# Patient Record
Sex: FEMALE | Race: WHITE | Employment: OTHER | URBAN - METROPOLITAN AREA
[De-identification: names, ages, dates, MRNs, and addresses within clinical notes are randomized per-mention and may not be internally consistent; named-entity substitution may affect disease eponyms.]

---

## 2019-10-04 ENCOUNTER — APPOINTMENT (OUTPATIENT)
Dept: GENERAL RADIOLOGY | Age: 84
DRG: 682 | End: 2019-10-04
Attending: EMERGENCY MEDICINE
Payer: MEDICARE

## 2019-10-04 ENCOUNTER — HOSPITAL ENCOUNTER (INPATIENT)
Age: 84
LOS: 8 days | Discharge: HOSPICE | DRG: 682 | End: 2019-10-12
Attending: EMERGENCY MEDICINE | Admitting: HOSPITALIST
Payer: MEDICARE

## 2019-10-04 ENCOUNTER — APPOINTMENT (OUTPATIENT)
Dept: CT IMAGING | Age: 84
DRG: 682 | End: 2019-10-04
Attending: EMERGENCY MEDICINE
Payer: MEDICARE

## 2019-10-04 DIAGNOSIS — G93.41 ACUTE METABOLIC ENCEPHALOPATHY: ICD-10-CM

## 2019-10-04 DIAGNOSIS — N17.9 ACUTE KIDNEY INJURY (HCC): ICD-10-CM

## 2019-10-04 DIAGNOSIS — R41.82 ALTERED MENTAL STATUS, UNSPECIFIED ALTERED MENTAL STATUS TYPE: Primary | ICD-10-CM

## 2019-10-04 DIAGNOSIS — E87.0 HYPERNATREMIA: ICD-10-CM

## 2019-10-04 LAB
ALBUMIN SERPL-MCNC: 3.4 G/DL (ref 3.2–4.6)
ALBUMIN/GLOB SERPL: 0.9 {RATIO} (ref 1.2–3.5)
ALP SERPL-CCNC: 60 U/L (ref 50–136)
ALT SERPL-CCNC: 12 U/L (ref 12–65)
ANION GAP SERPL CALC-SCNC: 7 MMOL/L (ref 7–16)
APPEARANCE UR: CLEAR
AST SERPL-CCNC: 16 U/L (ref 15–37)
BACTERIA URNS QL MICRO: 0 /HPF
BASOPHILS # BLD: 0.1 K/UL (ref 0–0.2)
BASOPHILS NFR BLD: 0 % (ref 0–2)
BILIRUB SERPL-MCNC: 0.4 MG/DL (ref 0.2–1.1)
BILIRUB UR QL: NEGATIVE
BUN SERPL-MCNC: 133 MG/DL (ref 8–23)
CALCIUM SERPL-MCNC: 9.4 MG/DL (ref 8.3–10.4)
CASTS URNS QL MICRO: ABNORMAL /LPF
CHLORIDE SERPL-SCNC: 126 MMOL/L (ref 98–107)
CO2 SERPL-SCNC: 25 MMOL/L (ref 21–32)
COLOR UR: YELLOW
CREAT SERPL-MCNC: 4.13 MG/DL (ref 0.6–1)
DIFFERENTIAL METHOD BLD: ABNORMAL
EOSINOPHIL # BLD: 0 K/UL (ref 0–0.8)
EOSINOPHIL NFR BLD: 0 % (ref 0.5–7.8)
EPI CELLS #/AREA URNS HPF: ABNORMAL /HPF
ERYTHROCYTE [DISTWIDTH] IN BLOOD BY AUTOMATED COUNT: 14.8 % (ref 11.9–14.6)
GLOBULIN SER CALC-MCNC: 4 G/DL (ref 2.3–3.5)
GLUCOSE BLD STRIP.AUTO-MCNC: 127 MG/DL (ref 65–100)
GLUCOSE SERPL-MCNC: 137 MG/DL (ref 65–100)
GLUCOSE UR STRIP.AUTO-MCNC: NEGATIVE MG/DL
HCT VFR BLD AUTO: 35.4 % (ref 35.8–46.3)
HGB BLD-MCNC: 10.6 G/DL (ref 11.7–15.4)
HGB UR QL STRIP: NEGATIVE
IMM GRANULOCYTES # BLD AUTO: 0.1 K/UL (ref 0–0.5)
IMM GRANULOCYTES NFR BLD AUTO: 1 % (ref 0–5)
KETONES UR QL STRIP.AUTO: NEGATIVE MG/DL
LACTATE BLD-SCNC: 1.07 MMOL/L (ref 0.5–1.9)
LEUKOCYTE ESTERASE UR QL STRIP.AUTO: NEGATIVE
LYMPHOCYTES # BLD: 2 K/UL (ref 0.5–4.6)
LYMPHOCYTES NFR BLD: 14 % (ref 13–44)
MCH RBC QN AUTO: 31.8 PG (ref 26.1–32.9)
MCHC RBC AUTO-ENTMCNC: 29.9 G/DL (ref 31.4–35)
MCV RBC AUTO: 106.3 FL (ref 79.6–97.8)
MONOCYTES # BLD: 1.1 K/UL (ref 0.1–1.3)
MONOCYTES NFR BLD: 8 % (ref 4–12)
NEUTS SEG # BLD: 10.9 K/UL (ref 1.7–8.2)
NEUTS SEG NFR BLD: 77 % (ref 43–78)
NITRITE UR QL STRIP.AUTO: NEGATIVE
NRBC # BLD: 0 K/UL (ref 0–0.2)
PH UR STRIP: 5 [PH] (ref 5–9)
PLATELET # BLD AUTO: 181 K/UL (ref 150–450)
PMV BLD AUTO: 13.5 FL (ref 9.4–12.3)
POTASSIUM SERPL-SCNC: 5 MMOL/L (ref 3.5–5.1)
PROCALCITONIN SERPL-MCNC: 0.2 NG/ML
PROT SERPL-MCNC: 7.4 G/DL (ref 6.3–8.2)
PROT UR STRIP-MCNC: ABNORMAL MG/DL
RBC # BLD AUTO: 3.33 M/UL (ref 4.05–5.2)
RBC #/AREA URNS HPF: ABNORMAL /HPF
SODIUM SERPL-SCNC: 158 MMOL/L (ref 136–145)
SP GR UR REFRACTOMETRY: 1.01 (ref 1–1.02)
UROBILINOGEN UR QL STRIP.AUTO: 0.2 EU/DL (ref 0.2–1)
WBC # BLD AUTO: 14.1 K/UL (ref 4.3–11.1)
WBC URNS QL MICRO: ABNORMAL /HPF

## 2019-10-04 PROCEDURE — 82962 GLUCOSE BLOOD TEST: CPT

## 2019-10-04 PROCEDURE — 65660000000 HC RM CCU STEPDOWN

## 2019-10-04 PROCEDURE — 81003 URINALYSIS AUTO W/O SCOPE: CPT

## 2019-10-04 PROCEDURE — 87040 BLOOD CULTURE FOR BACTERIA: CPT

## 2019-10-04 PROCEDURE — 77030011943: Performed by: EMERGENCY MEDICINE

## 2019-10-04 PROCEDURE — 85025 COMPLETE CBC W/AUTO DIFF WBC: CPT

## 2019-10-04 PROCEDURE — 80053 COMPREHEN METABOLIC PANEL: CPT

## 2019-10-04 PROCEDURE — 93005 ELECTROCARDIOGRAM TRACING: CPT | Performed by: EMERGENCY MEDICINE

## 2019-10-04 PROCEDURE — 71045 X-RAY EXAM CHEST 1 VIEW: CPT

## 2019-10-04 PROCEDURE — 83605 ASSAY OF LACTIC ACID: CPT

## 2019-10-04 PROCEDURE — 84145 PROCALCITONIN (PCT): CPT

## 2019-10-04 PROCEDURE — 70450 CT HEAD/BRAIN W/O DYE: CPT

## 2019-10-04 PROCEDURE — 51701 INSERT BLADDER CATHETER: CPT | Performed by: EMERGENCY MEDICINE

## 2019-10-04 PROCEDURE — 99285 EMERGENCY DEPT VISIT HI MDM: CPT | Performed by: EMERGENCY MEDICINE

## 2019-10-05 ENCOUNTER — APPOINTMENT (OUTPATIENT)
Dept: ULTRASOUND IMAGING | Age: 84
DRG: 682 | End: 2019-10-05
Attending: INTERNAL MEDICINE
Payer: MEDICARE

## 2019-10-05 LAB
ANION GAP SERPL CALC-SCNC: 11 MMOL/L (ref 7–16)
ANION GAP SERPL CALC-SCNC: 6 MMOL/L (ref 7–16)
ANION GAP SERPL CALC-SCNC: 8 MMOL/L (ref 7–16)
ANION GAP SERPL CALC-SCNC: 9 MMOL/L (ref 7–16)
ATRIAL RATE: 85 BPM
BASOPHILS # BLD: 0.1 K/UL (ref 0–0.2)
BASOPHILS NFR BLD: 0 % (ref 0–2)
BUN SERPL-MCNC: 104 MG/DL (ref 8–23)
BUN SERPL-MCNC: 106 MG/DL (ref 8–23)
BUN SERPL-MCNC: 114 MG/DL (ref 8–23)
BUN SERPL-MCNC: 87 MG/DL (ref 8–23)
CALCIUM SERPL-MCNC: 8.2 MG/DL (ref 8.3–10.4)
CALCIUM SERPL-MCNC: 8.3 MG/DL (ref 8.3–10.4)
CALCIUM SERPL-MCNC: 8.5 MG/DL (ref 8.3–10.4)
CALCIUM SERPL-MCNC: 8.7 MG/DL (ref 8.3–10.4)
CALCULATED P AXIS, ECG09: 73 DEGREES
CALCULATED R AXIS, ECG10: 58 DEGREES
CALCULATED T AXIS, ECG11: 71 DEGREES
CHLORIDE SERPL-SCNC: 121 MMOL/L (ref 98–107)
CHLORIDE SERPL-SCNC: 125 MMOL/L (ref 98–107)
CHLORIDE SERPL-SCNC: 128 MMOL/L (ref 98–107)
CHLORIDE SERPL-SCNC: 130 MMOL/L (ref 98–107)
CO2 SERPL-SCNC: 20 MMOL/L (ref 21–32)
CO2 SERPL-SCNC: 21 MMOL/L (ref 21–32)
CO2 SERPL-SCNC: 22 MMOL/L (ref 21–32)
CO2 SERPL-SCNC: 25 MMOL/L (ref 21–32)
CREAT SERPL-MCNC: 2.8 MG/DL (ref 0.6–1)
CREAT SERPL-MCNC: 2.99 MG/DL (ref 0.6–1)
CREAT SERPL-MCNC: 3.38 MG/DL (ref 0.6–1)
CREAT SERPL-MCNC: 3.69 MG/DL (ref 0.6–1)
CREAT UR-MCNC: 43.3 MG/DL
DIAGNOSIS, 93000: NORMAL
DIFFERENTIAL METHOD BLD: ABNORMAL
EOSINOPHIL # BLD: 0 K/UL (ref 0–0.8)
EOSINOPHIL NFR BLD: 0 % (ref 0.5–7.8)
ERYTHROCYTE [DISTWIDTH] IN BLOOD BY AUTOMATED COUNT: 14.6 % (ref 11.9–14.6)
GLUCOSE SERPL-MCNC: 125 MG/DL (ref 65–100)
GLUCOSE SERPL-MCNC: 138 MG/DL (ref 65–100)
GLUCOSE SERPL-MCNC: 166 MG/DL (ref 65–100)
GLUCOSE SERPL-MCNC: 179 MG/DL (ref 65–100)
HCT VFR BLD AUTO: 30.9 % (ref 35.8–46.3)
HGB BLD-MCNC: 9.3 G/DL (ref 11.7–15.4)
IMM GRANULOCYTES # BLD AUTO: 0.1 K/UL (ref 0–0.5)
IMM GRANULOCYTES NFR BLD AUTO: 1 % (ref 0–5)
LYMPHOCYTES # BLD: 1.8 K/UL (ref 0.5–4.6)
LYMPHOCYTES NFR BLD: 15 % (ref 13–44)
MCH RBC QN AUTO: 32 PG (ref 26.1–32.9)
MCHC RBC AUTO-ENTMCNC: 30.1 G/DL (ref 31.4–35)
MCV RBC AUTO: 106.2 FL (ref 79.6–97.8)
MONOCYTES # BLD: 0.9 K/UL (ref 0.1–1.3)
MONOCYTES NFR BLD: 7 % (ref 4–12)
NEUTS SEG # BLD: 9.5 K/UL (ref 1.7–8.2)
NEUTS SEG NFR BLD: 77 % (ref 43–78)
NRBC # BLD: 0 K/UL (ref 0–0.2)
OSMOLALITY UR: 374 MOSM/KG H2O (ref 50–1400)
P-R INTERVAL, ECG05: 164 MS
PLATELET # BLD AUTO: 150 K/UL (ref 150–450)
PMV BLD AUTO: 13.2 FL (ref 9.4–12.3)
POTASSIUM SERPL-SCNC: 4.1 MMOL/L (ref 3.5–5.1)
POTASSIUM SERPL-SCNC: 4.1 MMOL/L (ref 3.5–5.1)
POTASSIUM SERPL-SCNC: 4.5 MMOL/L (ref 3.5–5.1)
POTASSIUM SERPL-SCNC: 4.7 MMOL/L (ref 3.5–5.1)
PROT UR-MCNC: 31 MG/DL
PROT/CREAT UR-RTO: 0.7
Q-T INTERVAL, ECG07: 340 MS
QRS DURATION, ECG06: 68 MS
QTC CALCULATION (BEZET), ECG08: 404 MS
RBC # BLD AUTO: 2.91 M/UL (ref 4.05–5.2)
SODIUM SERPL-SCNC: 152 MMOL/L (ref 136–145)
SODIUM SERPL-SCNC: 154 MMOL/L (ref 136–145)
SODIUM SERPL-SCNC: 159 MMOL/L (ref 136–145)
SODIUM SERPL-SCNC: 161 MMOL/L (ref 136–145)
SODIUM UR-SCNC: 59 MMOL/L
VENTRICULAR RATE, ECG03: 85 BPM
WBC # BLD AUTO: 12.4 K/UL (ref 4.3–11.1)

## 2019-10-05 PROCEDURE — 74011000302 HC RX REV CODE- 302: Performed by: HOSPITALIST

## 2019-10-05 PROCEDURE — 74011250636 HC RX REV CODE- 250/636: Performed by: INTERNAL MEDICINE

## 2019-10-05 PROCEDURE — 77030020263 HC SOL INJ SOD CL0.9% LFCR 1000ML

## 2019-10-05 PROCEDURE — 80048 BASIC METABOLIC PNL TOTAL CA: CPT

## 2019-10-05 PROCEDURE — 77030020250 HC SOL INJ D 5% LFCR 1000ML BG LF

## 2019-10-05 PROCEDURE — 76775 US EXAM ABDO BACK WALL LIM: CPT

## 2019-10-05 PROCEDURE — 83935 ASSAY OF URINE OSMOLALITY: CPT

## 2019-10-05 PROCEDURE — 65660000000 HC RM CCU STEPDOWN

## 2019-10-05 PROCEDURE — 74011250636 HC RX REV CODE- 250/636: Performed by: HOSPITALIST

## 2019-10-05 PROCEDURE — 77030040361 HC SLV COMPR DVT MDII -B

## 2019-10-05 PROCEDURE — 84300 ASSAY OF URINE SODIUM: CPT

## 2019-10-05 PROCEDURE — 74011250636 HC RX REV CODE- 250/636: Performed by: EMERGENCY MEDICINE

## 2019-10-05 PROCEDURE — 84156 ASSAY OF PROTEIN URINE: CPT

## 2019-10-05 PROCEDURE — 77030019605

## 2019-10-05 PROCEDURE — 74011000258 HC RX REV CODE- 258: Performed by: HOSPITALIST

## 2019-10-05 PROCEDURE — 85025 COMPLETE CBC W/AUTO DIFF WBC: CPT

## 2019-10-05 PROCEDURE — 77030040830 HC CATH URETH FOL MDII -A

## 2019-10-05 PROCEDURE — 36415 COLL VENOUS BLD VENIPUNCTURE: CPT

## 2019-10-05 PROCEDURE — 86580 TB INTRADERMAL TEST: CPT | Performed by: HOSPITALIST

## 2019-10-05 PROCEDURE — 65270000029 HC RM PRIVATE

## 2019-10-05 RX ORDER — ACETAMINOPHEN 325 MG/1
650 TABLET ORAL
Status: DISCONTINUED | OUTPATIENT
Start: 2019-10-05 | End: 2019-10-12 | Stop reason: HOSPADM

## 2019-10-05 RX ORDER — SODIUM CHLORIDE 0.9 % (FLUSH) 0.9 %
5-40 SYRINGE (ML) INJECTION EVERY 8 HOURS
Status: DISCONTINUED | OUTPATIENT
Start: 2019-10-05 | End: 2019-10-12 | Stop reason: HOSPADM

## 2019-10-05 RX ORDER — HYDRALAZINE HYDROCHLORIDE 20 MG/ML
10 INJECTION INTRAMUSCULAR; INTRAVENOUS
Status: DISCONTINUED | OUTPATIENT
Start: 2019-10-05 | End: 2019-10-12 | Stop reason: HOSPADM

## 2019-10-05 RX ORDER — BISACODYL 5 MG
5 TABLET, DELAYED RELEASE (ENTERIC COATED) ORAL DAILY PRN
Status: DISCONTINUED | OUTPATIENT
Start: 2019-10-05 | End: 2019-10-12 | Stop reason: HOSPADM

## 2019-10-05 RX ORDER — ONDANSETRON 2 MG/ML
4 INJECTION INTRAMUSCULAR; INTRAVENOUS
Status: DISCONTINUED | OUTPATIENT
Start: 2019-10-05 | End: 2019-10-12 | Stop reason: HOSPADM

## 2019-10-05 RX ORDER — DEXTROSE MONOHYDRATE 50 MG/ML
125 INJECTION, SOLUTION INTRAVENOUS CONTINUOUS
Status: DISCONTINUED | OUTPATIENT
Start: 2019-10-05 | End: 2019-10-06

## 2019-10-05 RX ORDER — SODIUM CHLORIDE 0.9 % (FLUSH) 0.9 %
5-40 SYRINGE (ML) INJECTION AS NEEDED
Status: DISCONTINUED | OUTPATIENT
Start: 2019-10-05 | End: 2019-10-12 | Stop reason: HOSPADM

## 2019-10-05 RX ORDER — HEPARIN SODIUM 5000 [USP'U]/ML
5000 INJECTION, SOLUTION INTRAVENOUS; SUBCUTANEOUS EVERY 8 HOURS
Status: DISCONTINUED | OUTPATIENT
Start: 2019-10-05 | End: 2019-10-12 | Stop reason: HOSPADM

## 2019-10-05 RX ORDER — DEXTROSE MONOHYDRATE AND SODIUM CHLORIDE 5; .45 G/100ML; G/100ML
100 INJECTION, SOLUTION INTRAVENOUS CONTINUOUS
Status: DISCONTINUED | OUTPATIENT
Start: 2019-10-05 | End: 2019-10-05

## 2019-10-05 RX ADMIN — HEPARIN SODIUM 5000 UNITS: 5000 INJECTION INTRAVENOUS; SUBCUTANEOUS at 20:58

## 2019-10-05 RX ADMIN — Medication 10 ML: at 21:01

## 2019-10-05 RX ADMIN — HEPARIN SODIUM 5000 UNITS: 5000 INJECTION INTRAVENOUS; SUBCUTANEOUS at 12:22

## 2019-10-05 RX ADMIN — Medication 10 ML: at 03:53

## 2019-10-05 RX ADMIN — Medication 10 ML: at 14:39

## 2019-10-05 RX ADMIN — DEXTROSE MONOHYDRATE 125 ML/HR: 5 INJECTION, SOLUTION INTRAVENOUS at 23:43

## 2019-10-05 RX ADMIN — DEXTROSE MONOHYDRATE 125 ML/HR: 5 INJECTION, SOLUTION INTRAVENOUS at 15:33

## 2019-10-05 RX ADMIN — DEXTROSE MONOHYDRATE 125 ML/HR: 5 INJECTION, SOLUTION INTRAVENOUS at 07:43

## 2019-10-05 RX ADMIN — CEFTRIAXONE 1 G: 1 INJECTION, POWDER, FOR SOLUTION INTRAMUSCULAR; INTRAVENOUS at 01:54

## 2019-10-05 RX ADMIN — SODIUM CHLORIDE 1000 ML: 900 INJECTION, SOLUTION INTRAVENOUS at 00:02

## 2019-10-05 RX ADMIN — DEXTROSE MONOHYDRATE AND SODIUM CHLORIDE 100 ML/HR: 5; .45 INJECTION, SOLUTION INTRAVENOUS at 01:56

## 2019-10-05 RX ADMIN — Medication 10 ML: at 05:14

## 2019-10-05 RX ADMIN — TUBERCULIN PURIFIED PROTEIN DERIVATIVE 5 UNITS: 5 INJECTION, SOLUTION INTRADERMAL at 02:00

## 2019-10-05 NOTE — CONSULTS
[address] [place] [address]    Name:  Jaleesa Teixeira  MR#:  [id number]  :  1928  ACCOUNT #:  [de-identified]  DATE OF SERVICE:  10/05/2019    REASON FOR CONSULTATION:  We are seeing the patient at the request of Dr. Chanel Mejia with regards to acute-on-chronic kidney disease. HISTORY OF PRESENT ILLNESS:  The patient is a 80-year-old  female who was brought to the emergency room at [address] on 10/04/2019 by her son. Apparently, she had become more unresponsive over the past several days and had no oral intake during this period of time. The patient has chronic Alzheimer's disease and has been non-communicative for at least the past 4 years. Her condition has been slowly deteriorating and seems to have gotten more acutely worse this week. The patient apparently also has a history of chronic kidney disease. She has been seen by a physician at [facility]. Those records were not available for review unfortunately. Her son says that about a year ago her kidney function had dropped off from about 80% down to 50% of normal and since then has been progressively getting worse. He really did not know any other details about her medical condition. Review of lab work available here shows that on admission yesterday she had a BUN of 133, creatinine 4.13. By this morning after about 8 hours of IV fluid, her sodium had gone up from 158 up to 161. Her BUN has dropped from 133 down to 114. Her creatinine has gone from 4.13 down to 3.69. Urinalysis from yesterday here showed urine specific gravity of 1.015 with only a trace of protein present and 3-5 hyaline casts per high-powered field. PAST MEDICAL HISTORY:  Significant for stage III chronic kidney disease as stated above, Alzheimer's dementia, chronic hypertension, hyperlipidemia, breast cancer status post left mastectomy.     MEDICATIONS PRIOR TO ADMISSION:  Her medications prior to admission had included lisinopril. Currently, she is on Rocephin 1 g IV q.24 hours, heparin 5000 units subcu q.8 hours, and D5W at 125 mL/hour. ALLERGIES:  SHE HAS NO KNOWN DRUG ALLERGIES. SOCIAL HISTORY:  She lives at home with her  and a daughter who looks after both of her parents. She does not smoke, does not drink any alcohol. FAMILY HISTORY:  Negative for any kidney disease. REVIEW OF SYSTEMS:  Cannot be obtained. PHYSICAL EXAMINATION:  GENERAL:  Reveals an elderly white female who is unresponsive. VITAL SIGNS:  She is afebrile. Blood pressure is 134/72; pulse is 83; respirations are 18, they are not labored. HEENT:  Her head is normocephalic. Eyes:  Pupils are equal and reactive to light and accommodation. Extraocular muscles are intact. Fundi were not visualized. LUNGS:  Clear. No rales or wheezes heard. Breath sounds equal bilaterally. HEART:  Regular rate and rhythm. ABDOMEN:  Soft. Bowel sounds are present. There is no hepatosplenomegaly. GENITAL/RECTAL:  Deferred. EXTREMITIES:  She has no peripheral edema. IMPRESSION:  1. Acute-on-chronic kidney disease, at least part of this may be due to volume depletion and due to poor p.o. intake. 2.  Hypernatremia. Again, I feel that this is due to total free water deficit from poor oral intake. 3.  End-stage Alzheimer's disease. 4.  Remote history of breast cancer. 5.  History of hypertension. PLANS AND RECOMMENDATIONS:  I agree with IV hydration and as here she is currently receiving with D5W to help with both her volume depletion and hypernatremia, we will have a Riddle catheter placed. We will check urine electrolytes. We will also get a renal ultrasound to get an idea what her baseline renal function is.   I did have a discussion with her son about her overall medical condition and we both agreed that if her renal function did not improve that we would not proceed down the road of providing any dialytic support, but would only provide her with comfort care only. Thank you very much for allowing us to see her and helping in her care. We will follow with you.       Steve Rodriguez MD      [id number]  D:  10/05/2019 9:16  T:  10/05/2019 12:29  JOB #:  [id number]  CC:  [facility]       MD Lubna Hays MD

## 2019-10-05 NOTE — PROGRESS NOTES
Patient condition unchanged  She occasionally opens eyes but does not follow  Continue iv fluid  Site without redness or edema  Riddle patent with yellow urine  Daughter at bedside

## 2019-10-05 NOTE — ED PROVIDER NOTES
[id number] Ivory is a 80 y.o. female seen on 10/4/2019 at 11:21 PM in the [facility] EMERGENCY DEPT in room ER04/04. Chief Complaint   Patient presents with    Altered mental status     HPI: 51-year-old female brought into the emergency department by family for altered mental status and decreased p.o. intake. They note that she has significant dementia at baseline and is unable to hold conversations but she will make audible noises in response to physical stimuli. However over the last 2 days she has had no oral intake of either water or solid food. Her son states that he has been attempting to pipette Ensure via straw into her mouth but she is been drooling it out of the corner of her mouth. They noticed significant change from her baseline mental status. She does live at home with her  who they report he is unable to come to the ER tonight because he has a fever and is not feeling well. He does state that she would not want to be intubated or have CPR. Historian: Patient    REVIEW OF SYSTEMS     Review of Systems   Constitutional: Positive for activity change. Negative for fever. HENT: Negative. Eyes: Negative. Respiratory: Negative for cough, chest tightness, shortness of breath and wheezing. Cardiovascular: Negative for chest pain. Gastrointestinal: Negative for abdominal distention, abdominal pain, constipation, diarrhea and vomiting. Endocrine: Negative. Genitourinary: Negative for dysuria, flank pain, frequency and urgency. Neurological: Negative for dizziness, syncope and headaches. Psychiatric/Behavioral: Negative. All other systems reviewed and are negative. PAST MEDICAL HISTORY     No past medical history on file. No past surgical history on file.   Social History     Socioeconomic History    Marital status:      Spouse name: Not on file    Number of children: Not on file    Years of education: Not on file    Highest education level: Not on file     None     No Known Allergies     PHYSICAL EXAM       Vitals:    10/04/19 2141 10/04/19 2247   BP: 117/62    Pulse: 86    Resp: 28    Temp: 99.6 °F (37.6 °C) 98.8 °F (37.1 °C)   SpO2: 96%     Vital signs were reviewed. Physical Exam   Constitutional: She appears well-developed. She appears lethargic. No distress. Very thin   HENT:   Head: Normocephalic and atraumatic. Eyes: Pupils are equal, round, and reactive to light. EOM are normal.   Neck: Normal range of motion. Neck supple. Cardiovascular: Normal rate, regular rhythm, normal heart sounds and intact distal pulses. Exam reveals no gallop and no friction rub. No murmur heard. Pulmonary/Chest: Effort normal and breath sounds normal. No stridor. No respiratory distress. She has no wheezes. Abdominal: Soft. Bowel sounds are normal. She exhibits no distension and no mass. There is no tenderness. There is no rebound and no guarding. Musculoskeletal: Normal range of motion. She exhibits no edema, tenderness or deformity. Neurological: She appears lethargic. No sensory deficit. GCS eye subscore is 4. GCS verbal subscore is 1. GCS motor subscore is 5.   responds to noxious stimuli in all 4 extremities, no verbal response, spontaneous eye movements   Skin: Skin is warm and dry. No rash noted. She is not diaphoretic. No erythema. Psychiatric: She has a normal mood and affect. Her behavior is normal.   Vitals reviewed.        MEDICAL DECISION MAKING     MDM  Number of Diagnoses or Management Options  Acute kidney injury ([facility] [address]): new, needed workup  Acute metabolic encephalopathy: new, needed workup  Altered mental status, unspecified altered mental status type: new, needed workup  Hypernatremia: new, needed workup     Amount and/or Complexity of Data Reviewed  Clinical lab tests: ordered and reviewed  Tests in the radiology section of CPT®: ordered and reviewed  Obtain history from someone other than the patient: yes    Risk of Complications, Morbidity, and/or Mortality  Presenting problems: high  Diagnostic procedures: high  Management options: high    Patient Progress  Patient progress: (Remains critical  )    Procedures    ED Course: Initial presentation and concern for possible sepsis, dehydration, intracranial lesion. At this time, CT is pending as is urinalysis. She will need to be admitted to the hospital, but will be signed out to Dr. Shalonda Serna on to follow-up on the results of these tests and then discussed the case with the hospitalist for admission. Disposition: Transition of care to Dr. Shalonda Serna  Diagnosis:  Altered mental status  ____________________________________________________________________  A portion of this note was generated using voice recognition dictation software. While the note has been reviewed for accuracy, please note certain words and phrases may not be transcribed as intended and some grammatical and/or typographical errors may be present. Results Reviewed:      Recent Results (from the past 24 hour(s))   EKG, 12 LEAD, INITIAL    Collection Time: 10/04/19  9:49 PM   Result Value Ref Range    Ventricular Rate 85 BPM    Atrial Rate 85 BPM    P-R Interval 164 ms    QRS Duration 68 ms    Q-T Interval 340 ms    QTC Calculation (Bezet) 404 ms    Calculated P Axis 73 degrees    Calculated R Axis 58 degrees    Calculated T Axis 71 degrees    Diagnosis       !! AGE AND GENDER SPECIFIC ECG ANALYSIS !!   Normal sinus rhythm  Septal infarct , age undetermined  Abnormal ECG  No previous ECGs available     CBC WITH AUTOMATED DIFF    Collection Time: 10/04/19  9:51 PM   Result Value Ref Range    WBC 14.1 (H) 4.3 - 11.1 K/uL    RBC 3.33 (L) 4.05 - 5.2 M/uL    HGB 10.6 (L) 11.7 - 15.4 g/dL    HCT 35.4 (L) 35.8 - 46.3 %    .3 (H) 79.6 - 97.8 FL    MCH 31.8 26.1 - 32.9 PG    MCHC 29.9 (L) 31.4 - 35.0 g/dL    RDW 14.8 (H) 11.9 - 14.6 %    PLATELET 866 333 - 834 K/uL    MPV 13.5 (H) 9.4 - 12.3 FL    ABSOLUTE NRBC 0.00 0.0 - 0.2 K/uL    DF AUTOMATED      NEUTROPHILS 77 43 - 78 %    LYMPHOCYTES 14 13 - 44 %    MONOCYTES 8 4.0 - 12.0 %    EOSINOPHILS 0 (L) 0.5 - 7.8 %    BASOPHILS 0 0.0 - 2.0 %    IMMATURE GRANULOCYTES 1 0.0 - 5.0 %    ABS. NEUTROPHILS 10.9 (H) 1.7 - 8.2 K/UL    ABS. LYMPHOCYTES 2.0 0.5 - 4.6 K/UL    ABS. MONOCYTES 1.1 0.1 - 1.3 K/UL    ABS. EOSINOPHILS 0.0 0.0 - 0.8 K/UL    ABS. BASOPHILS 0.1 0.0 - 0.2 K/UL    ABS. IMM.  GRANS. 0.1 0.0 - 0.5 K/UL   PROCALCITONIN    Collection Time: 10/04/19  9:51 PM   Result Value Ref Range    Procalcitonin 0.2 ng/mL   POC LACTIC ACID    Collection Time: 10/04/19  9:51 PM   Result Value Ref Range    Lactic Acid (POC) 1.07 0.5 - 1.9 mmol/L   GLUCOSE, POC    Collection Time: 10/04/19  9:56 PM   Result Value Ref Range    Glucose (POC) 127 (H) 65 - 100 mg/dL   URINALYSIS W/ RFLX MICROSCOPIC    Collection Time: 10/04/19 10:40 PM   Result Value Ref Range    Color YELLOW      Appearance CLEAR      Specific gravity 1.015 1.001 - 1.023      pH (UA) 5.0 5.0 - 9.0      Protein TRACE (A) NEG mg/dL    Glucose NEGATIVE  mg/dL    Ketone NEGATIVE  NEG mg/dL    Bilirubin NEGATIVE  NEG      Blood NEGATIVE  NEG      Urobilinogen 0.2 0.2 - 1.0 EU/dL    Nitrites NEGATIVE  NEG      Leukocyte Esterase NEGATIVE  NEG      WBC 0-3 0 /hpf    RBC 3-5 0 /hpf    Epithelial cells 0-3 0 /hpf    Bacteria 0 0 /hpf    Casts 3-5 0 /lpf   METABOLIC PANEL, COMPREHENSIVE    Collection Time: 10/04/19 10:41 PM   Result Value Ref Range    Sodium 158 (H) 136 - 145 mmol/L    Potassium 5.0 3.5 - 5.1 mmol/L    Chloride 126 (H) 98 - 107 mmol/L    CO2 25 21 - 32 mmol/L    Anion gap 7 7 - 16 mmol/L    Glucose 137 (H) 65 - 100 mg/dL     (H) 8 - 23 MG/DL    Creatinine 4.13 (H) 0.6 - 1.0 MG/DL    GFR est AA 13 (L) >60 ml/min/1.73m2    GFR est non-AA 11 (L) >60 ml/min/1.73m2    Calcium 9.4 8.3 - 10.4 MG/DL    Bilirubin, total 0.4 0.2 - 1.1 MG/DL    ALT (SGPT) 12 12 - 65 U/L    AST (SGOT) 16 15 - 37 U/L    Alk. phosphatase 60 50 - 136 U/L    Protein, total 7.4 6.3 - 8.2 g/dL    Albumin 3.4 3.2 - 4.6 g/dL    Globulin 4.0 (H) 2.3 - 3.5 g/dL    A-G Ratio 0.9 (L) 1.2 - 3.5         CT HEAD WO CONT   Final Result   IMPRESSION:      No acute intracranial abnormalities. Date of Dictation: 10/4/2019 11:34 PM         XR CHEST PORT   Final Result   IMPRESSION: Thoracic aortic aneurysm versus ectasia. There are no prior studies. Discussed with hospitalist who will evaluate in the emergency department.

## 2019-10-05 NOTE — PROGRESS NOTES
Patient arrives room 832 via stretcher accompanied by son. Patient not verbal . Patient can't follow commands. Respirations are even and unlabored. Dual skin assessment completed with Davis Pax. Redness noted on sacrum and bilateral heels. No skin break down noted. Bed is low and locked.

## 2019-10-05 NOTE — PROGRESS NOTES
Patient does not open eyes  Moves arms in response to tactile stimuli  Respirations are unlabored  Breath sounds clear.   Son staying at bedside

## 2019-10-05 NOTE — PROGRESS NOTES
#16 reyes inserted per order  Assisted by [facility] RN  Sterile technique  Immediate return of clear urine   Patient tolerated well

## 2019-10-05 NOTE — ED TRIAGE NOTES
Pt arrives with EMS M3, patient came from home, family called 46 for decreased alertness and reports patient has not eaten or drunken anything in 2 days. Per EMS, family reports baseline mentation is get up from chair and walk with assistance and is able to eat. In past 36 hours, patient has not been verbal, but will respond to painful stimuli and still moves extremities. EMS bgl 140, /80, HR 86. 18g IV present in left forearm. Per family, a DNR has been discussed but not completed.

## 2019-10-05 NOTE — ED NOTES
TRANSFER - OUT REPORT:    Verbal report given to [facility] on [employer]  being transferred to [id number] for routine progression of care       Report consisted of patients Situation, Background, Assessment and   Recommendations(SBAR). Information from the following report(s) SBAR, ED Summary and MAR was reviewed with the receiving nurse. Lines:   Peripheral IV 10/04/19 Left; Outer Forearm (Active)   Site Assessment Clean, dry, & intact 10/4/2019 10:18 PM   Phlebitis Assessment 0 10/4/2019 10:18 PM   Infiltration Assessment 0 10/4/2019 10:18 PM   Dressing Status Clean, dry, & intact 10/4/2019 10:18 PM   Action Taken Blood drawn 10/4/2019 10:18 PM       Peripheral IV 10/04/19 Left Antecubital (Active)   Site Assessment Clean, dry, & intact 10/4/2019 10:18 PM   Phlebitis Assessment 0 10/4/2019 10:18 PM   Infiltration Assessment 0 10/4/2019 10:18 PM   Dressing Status Clean, dry, & intact 10/4/2019 10:18 PM   Action Taken Blood drawn 10/4/2019 10:18 PM       Peripheral IV 10/04/19 Right Antecubital (Active)   Site Assessment Clean, dry, & intact 10/4/2019 10:20 PM   Phlebitis Assessment 0 10/4/2019 10:20 PM   Infiltration Assessment 0 10/4/2019 10:20 PM   Dressing Status Clean, dry, & intact 10/4/2019 10:20 PM   Action Taken Blood drawn 10/4/2019 10:20 PM        Opportunity for questions and clarification was provided.       Patient transported with:   [employer]

## 2019-10-05 NOTE — H&P
Hospitalist Note     Admit Date:  10/4/2019  9:38 PM   Name:  Doris Hawk   Age:  80 y.o.  :  1928   MRN:  [id number]   PCP:  Pattie Correia MD  Treatment Team: Attending Provider: Citlalli Chavira MD; Primary Nurse: Silverio Bolanos RN    HPI/Subjective:   Chief complaint altered mental status poorly responsive    History of present illness and review of systems could not be obtained from the patient secondary to her altered mental status. Most of the information is obtained from patient's son Eligio Kaiser at bedside as well as ER physician. This is a 80-year-old female with past medical history significant for Alzheimer's dementia, hypertension, dyslipidemia, breast cancer status post left mastectomy, CKD unknown stage presented to the ER because of altered mental status. Patient's son states that she has been doing fairly well until 2 to 3 days ago when she stopped eating or drinking much. She was also very weak and unable to ambulate. At baseline patient is able to ambulate and will respond with audible noises. She lives at home with her  who is sick with fever. Per the son, the patient is not having any nausea or vomiting or diarrhea. No cough or shortness of breath. At the time of my evaluation patient is awake with eyes open but not responding to verbal stimuli. She is able to withdraw from pain. ER course  Patient is afebrile and hemodynamically stable. CBC with white cell count of 14, hemoglobin of 10.6 platelet count of 221    CMP with sodium of 158 potassium 5 chloride 126 bicarbonate of 25 BUN of 133 creatinine of 4.13 glucose of 137. Total bilirubin of 0.4 ALT 12 AST 16 alkaline phosphatase 16 procalcitonin of 0.2   urine analysis, with no signs of UTI. CT scan of the head without contrast negative for acute intracranial abnormality. Chest x-ray was personally reviewed by me showed no acute cardiopulmonary abnormality.   There is thoracic aortic aneurysm versus ectasia no prior studies for comparison. EKG showed normal sinus rhythm with no ST-T wave changes suggestive of ischemia. IV Fluids NS bolus x1,     Patient admitted for further evaluation management of her acute metabolic encephalopathy acute kidney injury on chronic kidney disease and severe hypernatremia. PMHx:  HTN  Dyslipidemia  Ultram as dementia  Breast cancer status post mastectomy  CKD unknown stage    Past surgical history  Left mastectomy  Appendectomy    Family history was reviewed with the patient's son and not contributory. Social history:  The patient is not a former or current smoker no alcohol use or usage of illicit drugs per the son at bedside    No Known Allergies       PTA Medications:  Lisinopril hydrochlorothiazide 20/12.5 mg       Objective:     Patient Vitals for the past 24 hrs:   Temp Pulse Resp BP SpO2   10/04/19 2247 98.8 °F (37.1 °C)       10/04/19 2141 99.6 °F (37.6 °C) 86 28 117/62 96 %     Oxygen Therapy  O2 Sat (%): 96 % (10/04/19 2141)  O2 Device: Room air (10/04/19 2141)    No intake or output data in the 24 hours ending 10/05/19 0037    *Note that automatically entered I/Os may not be accurate; dependent on patient compliance with collection and accurate  by assistants. Physical Exam:  General:    Elderly female minimally responsive, awake with eyes open, withdraws to pain, ill-appearing,   Eyes:   Normal sclerae. Pupils equal round reacting to light, extraocular movements intact. HENT:  Normocephalic, atraumatic. Very dry mucous membranes  CV:   RRR. No m/r/g. Lungs:  CTAB. No wheezing, rhonchi, or rales. Abdomen: Soft, nontender, nondistended. No organomegaly, bowel sounds active,   Extremities: Warm and dry. No cyanosis or edema.   Neurologic: Limited neurological exam as patient is minimally responsive, no focal neurological deficits on exam, patient has intermittent shaking movements in the right upper extremity which are self resolved and this is chronic for years, per patient's son at bedside  Skin:     No rashes or jaundice. Normal coloration  Psych:  Unable to assess    I reviewed the labs, imaging, EKGs, telemetry, and other studies done this admission. Data Review:   Recent Results (from the past 24 hour(s))   EKG, 12 LEAD, INITIAL    Collection Time: 10/04/19  9:49 PM   Result Value Ref Range    Ventricular Rate 85 BPM    Atrial Rate 85 BPM    P-R Interval 164 ms    QRS Duration 68 ms    Q-T Interval 340 ms    QTC Calculation (Bezet) 404 ms    Calculated P Axis 73 degrees    Calculated R Axis 58 degrees    Calculated T Axis 71 degrees    Diagnosis       !! AGE AND GENDER SPECIFIC ECG ANALYSIS !! Normal sinus rhythm  Septal infarct , age undetermined  Abnormal ECG  No previous ECGs available     CBC WITH AUTOMATED DIFF    Collection Time: 10/04/19  9:51 PM   Result Value Ref Range    WBC 14.1 (H) 4.3 - 11.1 K/uL    RBC 3.33 (L) 4.05 - 5.2 M/uL    HGB 10.6 (L) 11.7 - 15.4 g/dL    HCT 35.4 (L) 35.8 - 46.3 %    .3 (H) 79.6 - 97.8 FL    MCH 31.8 26.1 - 32.9 PG    MCHC 29.9 (L) 31.4 - 35.0 g/dL    RDW 14.8 (H) 11.9 - 14.6 %    PLATELET 451 625 - 762 K/uL    MPV 13.5 (H) 9.4 - 12.3 FL    ABSOLUTE NRBC 0.00 0.0 - 0.2 K/uL    DF AUTOMATED      NEUTROPHILS 77 43 - 78 %    LYMPHOCYTES 14 13 - 44 %    MONOCYTES 8 4.0 - 12.0 %    EOSINOPHILS 0 (L) 0.5 - 7.8 %    BASOPHILS 0 0.0 - 2.0 %    IMMATURE GRANULOCYTES 1 0.0 - 5.0 %    ABS. NEUTROPHILS 10.9 (H) 1.7 - 8.2 K/UL    ABS. LYMPHOCYTES 2.0 0.5 - 4.6 K/UL    ABS. MONOCYTES 1.1 0.1 - 1.3 K/UL    ABS. EOSINOPHILS 0.0 0.0 - 0.8 K/UL    ABS. BASOPHILS 0.1 0.0 - 0.2 K/UL    ABS. IMM.  GRANS. 0.1 0.0 - 0.5 K/UL   PROCALCITONIN    Collection Time: 10/04/19  9:51 PM   Result Value Ref Range    Procalcitonin 0.2 ng/mL   POC LACTIC ACID    Collection Time: 10/04/19  9:51 PM   Result Value Ref Range    Lactic Acid (POC) 1.07 0.5 - 1.9 mmol/L   GLUCOSE, POC    Collection Time: 10/04/19  9:56 PM Result Value Ref Range    Glucose (POC) 127 (H) 65 - 100 mg/dL   URINALYSIS W/ RFLX MICROSCOPIC    Collection Time: 10/04/19 10:40 PM   Result Value Ref Range    Color YELLOW      Appearance CLEAR      Specific gravity 1.015 1.001 - 1.023      pH (UA) 5.0 5.0 - 9.0      Protein TRACE (A) NEG mg/dL    Glucose NEGATIVE  mg/dL    Ketone NEGATIVE  NEG mg/dL    Bilirubin NEGATIVE  NEG      Blood NEGATIVE  NEG      Urobilinogen 0.2 0.2 - 1.0 EU/dL    Nitrites NEGATIVE  NEG      Leukocyte Esterase NEGATIVE  NEG      WBC 0-3 0 /hpf    RBC 3-5 0 /hpf    Epithelial cells 0-3 0 /hpf    Bacteria 0 0 /hpf    Casts 3-5 0 /lpf   METABOLIC PANEL, COMPREHENSIVE    Collection Time: 10/04/19 10:41 PM   Result Value Ref Range    Sodium 158 (H) 136 - 145 mmol/L    Potassium 5.0 3.5 - 5.1 mmol/L    Chloride 126 (H) 98 - 107 mmol/L    CO2 25 21 - 32 mmol/L    Anion gap 7 7 - 16 mmol/L    Glucose 137 (H) 65 - 100 mg/dL     (H) 8 - 23 MG/DL    Creatinine 4.13 (H) 0.6 - 1.0 MG/DL    GFR est AA 13 (L) >60 ml/min/1.73m2    GFR est non-AA 11 (L) >60 ml/min/1.73m2    Calcium 9.4 8.3 - 10.4 MG/DL    Bilirubin, total 0.4 0.2 - 1.1 MG/DL    ALT (SGPT) 12 12 - 65 U/L    AST (SGOT) 16 15 - 37 U/L    Alk.  phosphatase 60 50 - 136 U/L    Protein, total 7.4 6.3 - 8.2 g/dL    Albumin 3.4 3.2 - 4.6 g/dL    Globulin 4.0 (H) 2.3 - 3.5 g/dL    A-G Ratio 0.9 (L) 1.2 - 3.5         All Micro Results     Procedure Component Value Units Date/Time    CULTURE, BLOOD [[id number]] Collected:  10/04/19 2205    Order Status:  Completed Specimen:  Blood Updated:  10/04/19 2240    CULTURE, BLOOD [[id number]] Collected:  10/04/19 2213    Order Status:  Completed Specimen:  Blood Updated:  10/04/19 8264          Current Facility-Administered Medications   Medication Dose Route Frequency    sodium chloride (NS) flush 5-40 mL  5-40 mL IntraVENous Q8H    sodium chloride (NS) flush 5-40 mL  5-40 mL IntraVENous PRN    tuberculin injection 5 Units  5 Units IntraDERMal ONCE    dextrose 5 % - 0.45% NaCl infusion  100 mL/hr IntraVENous CONTINUOUS    acetaminophen (TYLENOL) tablet 650 mg  650 mg Oral Q4H PRN    ondansetron (ZOFRAN) injection 4 mg  4 mg IntraVENous Q4H PRN    bisacodyl (DULCOLAX) tablet 5 mg  5 mg Oral DAILY PRN    heparin (porcine) injection 5,000 Units  5,000 Units SubCUTAneous Q8H    sodium chloride 0.9 % bolus infusion 1,000 mL  1,000 mL IntraVENous ONCE     No current outpatient medications on file. Other Studies:  Ct Head Wo Cont    Result Date: 10/4/2019  CT HEAD WITHOUT CONTRAST HISTORY:  Transient alteration of awareness. . COMPARISON: None. TECHNIQUE: Axial imaging was performed without intravenous contrast utilizing 5mm slice thickness. Sagittal and coronal reformats were performed. Radiation dose reduction techniques were used for this study. Our CT scanner uses one or all of the following: Automated exposure control, adjustment of the MAS or KUB according to patient's size and iterative reconstruction. FINDINGS:    *BRAIN:    -  There are no early signs of territorial or lacunar infarction by CT.    -  Symmetric supratentorial atrophy. -  For patient's age, the scattered areas of white matter hypodensities may represent a chronic small vessel white matter ischemia. However this is nonspecific. *VISUALIZED PARANASAL SINUSES: Well aerated. *MASTOIDS:  Clear. *CALVARIUM AND SCALP: Unremarkable. IMPRESSION: No acute intracranial abnormalities. Date of Dictation: 10/4/2019 11:34 PM     Xr Chest Port    Result Date: 10/4/2019  EXAM: XR CHEST PORT INDICATION: AMS COMPARISON: None. FINDINGS: A portable AP radiograph of the chest was obtained at 2216 hours. The patient is on a cardiac monitor. The lungs are clear. Thoracic aortic aneurysm versus ectasia. .  The bones and soft tissues are grossly within normal limits. IMPRESSION: Thoracic aortic aneurysm versus ectasia. There are no prior studies.       Assessment and Plan:     [facility] Problems as of 10/5/2019 Never Reviewed          Codes Class Noted - Resolved POA    Hypernatremia ICD-10-CM: E87.0  ICD-9-CM: 276.0  10/4/2019 - Present Unknown        MATTHEW (acute kidney injury) ([facility] [address] ICD-10-CM: N17.9  ICD-9-CM: 584.9  10/4/2019 - Present Unknown        Acute metabolic encephalopathy UCQ-36-IX: G93.41  ICD-9-CM: 348.31  10/4/2019 - Present Unknown              Plan: This is a 80-year-old female with    Acute metabolic encephalopathy multifactorial in the setting of baseline Alzheimer's dementia   Could be secondary to uremia, severe dehydration with worsening renal function, hypernatremia  CT head negative. Underlying infection cannot be completely ruled out   Procalcitonin 0.2, blood cultures x2 done on admission  Will start the patient empirically on ceftriaxone   Will hydrate the patient with IV fluids,  Neurochecks with vital signs  Will check MRI of the brain. Acute kidney injury on chronic kidney disease unknown stage  Creatinine of 4.3 with BUN of 133  Patient received normal saline bolus 1 L in the ER  We will do D5 half-normal saline given severe hypernatremia. Repeat BMP  every 6 hours. Will consult nephrology. Appreciate recommendations  Per patient's son, no dialysis but will discuss with the patient's . Severe hypernatremia  Free water deficit of 1794 mL. Hydrate with D5 half-normal saline at 100 mL/h  BMP every 6 hours  Goal correction not more than 6 to 8 mEq in 24 hours    Alzheimer's dementia    Hypertension  Lisinopril and hydrochlorothiazide on hold due to acute kidney injury  Hydralazine as needed    Dyslipidemia:   Not on medications    Diet NPO given pt's altered mental status and poor responsiveness. Discussed with the patient's son about the critical condition of the patient. Agreeable to hydration and current management plan.   But if the patient's condition does not improve, then will readdress the goals of care    Discharge planning: Patient son states that family prefers patient be discharged home.     DVT ppx: Heparin subcu  Code status:  DNR  DPOA patient's son Bogdan Ramey phone number [phone]  Estimated LOS:  Greater than 2 midnights  Risk:  high    Signed:  Yoli Cam MD

## 2019-10-05 NOTE — PROGRESS NOTES
Speech therapy note  Attempted to see pt this am, however, pt unable to awake.      Deangelo Connell MA/VALENCIA/SLP

## 2019-10-05 NOTE — CONSULTS
[facility]        Subjective: A on CKD  Renal consult dictated # [id number]    Review of Systems -   Can not be obtained due to pt's condition. Objective:    Vitals:    10/05/19 0224 10/05/19 0254 10/05/19 0354 10/05/19 0810   BP:  142/69 138/77 134/72   Pulse:  90 86 83   Resp:  24 18 18   Temp:   98.2 °F (36.8 °C) 97.7 °F (36.5 °C)   SpO2: 100% 100% 96% 94%   Weight:       Height:           PE  Gen: unresponsiive  CV:reg rate  Chest:clear  Abd: soft  Ext/Access: no edema       . LAB  Recent Labs     10/04/19  2151   WBC 14.1*   HGB 10.6*   HCT 35.4*        Recent Labs     10/05/19  0558 10/04/19  2241   * 158*   K 4.7 5.0   * 126*   CO2 20* 25   * 137*   * 133*   CREA 3.69* 4.13*   CA 8.5 9.4   ALB  --  3.4   TBILI  --  0.4   ALT  --  12   SGOT  --  16           Radiology    A/P:   Patient Active Problem List   Diagnosis Code    Hypernatremia E87.0    MATTHEW (acute kidney injury) ([facility] [address] N17.9    Acute metabolic encephalopathy U60.11     A on CKD3 - baseline renal function not available. At least part of this is due to volume depletion due to poor PO intake from her Alzheimer's. Will check urine lytes. , renal ultrasound. Riddle cath. Hypernatremia. - suspect this is due to pure water deficit from poor PO intake, not DI    HTN    Dementia.          Hector Benson MD

## 2019-10-05 NOTE — PROGRESS NOTES
Returned to room from ultrasound  Patient opens eyes and mumbles on transfer from stretcher to bed  Son remains at bedside

## 2019-10-05 NOTE — PROGRESS NOTES
Visit by spiritual volunteer.     Iram Bhatti, staff Kendrick mcg[address]is 80, [address] [facility]  /   [email or web address]

## 2019-10-06 ENCOUNTER — APPOINTMENT (OUTPATIENT)
Dept: MRI IMAGING | Age: 84
DRG: 682 | End: 2019-10-06
Attending: HOSPITALIST
Payer: MEDICARE

## 2019-10-06 ENCOUNTER — APPOINTMENT (OUTPATIENT)
Dept: GENERAL RADIOLOGY | Age: 84
DRG: 682 | End: 2019-10-06
Attending: HOSPITALIST
Payer: MEDICARE

## 2019-10-06 PROBLEM — R62.7 ADULT FAILURE TO THRIVE: Status: ACTIVE | Noted: 2019-10-06

## 2019-10-06 LAB
ANION GAP SERPL CALC-SCNC: 5 MMOL/L (ref 7–16)
ANION GAP SERPL CALC-SCNC: 8 MMOL/L (ref 7–16)
ANION GAP SERPL CALC-SCNC: 9 MMOL/L (ref 7–16)
BUN SERPL-MCNC: 66 MG/DL (ref 8–23)
BUN SERPL-MCNC: 71 MG/DL (ref 8–23)
BUN SERPL-MCNC: 78 MG/DL (ref 8–23)
CALCIUM SERPL-MCNC: 7.8 MG/DL (ref 8.3–10.4)
CALCIUM SERPL-MCNC: 7.9 MG/DL (ref 8.3–10.4)
CALCIUM SERPL-MCNC: 8.1 MG/DL (ref 8.3–10.4)
CHLORIDE SERPL-SCNC: 116 MMOL/L (ref 98–107)
CHLORIDE SERPL-SCNC: 117 MMOL/L (ref 98–107)
CHLORIDE SERPL-SCNC: 118 MMOL/L (ref 98–107)
CO2 SERPL-SCNC: 20 MMOL/L (ref 21–32)
CO2 SERPL-SCNC: 21 MMOL/L (ref 21–32)
CO2 SERPL-SCNC: 22 MMOL/L (ref 21–32)
CREAT SERPL-MCNC: 2.2 MG/DL (ref 0.6–1)
CREAT SERPL-MCNC: 2.36 MG/DL (ref 0.6–1)
CREAT SERPL-MCNC: 2.49 MG/DL (ref 0.6–1)
GLUCOSE SERPL-MCNC: 116 MG/DL (ref 65–100)
GLUCOSE SERPL-MCNC: 129 MG/DL (ref 65–100)
GLUCOSE SERPL-MCNC: 99 MG/DL (ref 65–100)
MM INDURATION POC: 0 MM (ref 0–5)
POTASSIUM SERPL-SCNC: 3.8 MMOL/L (ref 3.5–5.1)
POTASSIUM SERPL-SCNC: 4.3 MMOL/L (ref 3.5–5.1)
POTASSIUM SERPL-SCNC: 5.1 MMOL/L (ref 3.5–5.1)
PPD POC: NEGATIVE NEGATIVE
SODIUM SERPL-SCNC: 145 MMOL/L (ref 136–145)
SODIUM SERPL-SCNC: 145 MMOL/L (ref 136–145)
SODIUM SERPL-SCNC: 146 MMOL/L (ref 136–145)

## 2019-10-06 PROCEDURE — 80048 BASIC METABOLIC PNL TOTAL CA: CPT

## 2019-10-06 PROCEDURE — 65270000029 HC RM PRIVATE

## 2019-10-06 PROCEDURE — 74011000258 HC RX REV CODE- 258: Performed by: HOSPITALIST

## 2019-10-06 PROCEDURE — 77030020250 HC SOL INJ D 5% LFCR 1000ML BG LF

## 2019-10-06 PROCEDURE — 71045 X-RAY EXAM CHEST 1 VIEW: CPT

## 2019-10-06 PROCEDURE — 65660000000 HC RM CCU STEPDOWN

## 2019-10-06 PROCEDURE — 74011250636 HC RX REV CODE- 250/636: Performed by: INTERNAL MEDICINE

## 2019-10-06 PROCEDURE — 77030020253 HC SOL INJ D545NS .05 DEX .45 SAL

## 2019-10-06 PROCEDURE — 74011250636 HC RX REV CODE- 250/636: Performed by: HOSPITALIST

## 2019-10-06 PROCEDURE — 36415 COLL VENOUS BLD VENIPUNCTURE: CPT

## 2019-10-06 PROCEDURE — 74011000258 HC RX REV CODE- 258: Performed by: INTERNAL MEDICINE

## 2019-10-06 RX ORDER — DEXTROSE MONOHYDRATE AND SODIUM CHLORIDE 5; .45 G/100ML; G/100ML
125 INJECTION, SOLUTION INTRAVENOUS CONTINUOUS
Status: DISCONTINUED | OUTPATIENT
Start: 2019-10-06 | End: 2019-10-07

## 2019-10-06 RX ADMIN — HEPARIN SODIUM 5000 UNITS: 5000 INJECTION INTRAVENOUS; SUBCUTANEOUS at 21:02

## 2019-10-06 RX ADMIN — DEXTROSE MONOHYDRATE AND SODIUM CHLORIDE 125 ML/HR: 5; .45 INJECTION, SOLUTION INTRAVENOUS at 09:36

## 2019-10-06 RX ADMIN — Medication 10 ML: at 06:07

## 2019-10-06 RX ADMIN — Medication 10 ML: at 17:41

## 2019-10-06 RX ADMIN — CEFTRIAXONE 1 G: 1 INJECTION, POWDER, FOR SOLUTION INTRAMUSCULAR; INTRAVENOUS at 00:18

## 2019-10-06 RX ADMIN — HEPARIN SODIUM 5000 UNITS: 5000 INJECTION INTRAVENOUS; SUBCUTANEOUS at 04:31

## 2019-10-06 RX ADMIN — DEXTROSE MONOHYDRATE AND SODIUM CHLORIDE 125 ML/HR: 5; .45 INJECTION, SOLUTION INTRAVENOUS at 21:04

## 2019-10-06 RX ADMIN — HEPARIN SODIUM 5000 UNITS: 5000 INJECTION INTRAVENOUS; SUBCUTANEOUS at 12:02

## 2019-10-06 RX ADMIN — Medication 10 ML: at 21:03

## 2019-10-06 RX ADMIN — DEXTROSE MONOHYDRATE 125 ML/HR: 5 INJECTION, SOLUTION INTRAVENOUS at 07:03

## 2019-10-06 NOTE — PROGRESS NOTES
Pt is stable resting in bed with family at bedside. Does not appear to be in distress. Does not respond to speech. Respirations even and unlabored. Safety measures in place, bed low and locked, call light in reach. SBAR report given to oncoming nurse.

## 2019-10-06 NOTE — PROGRESS NOTES
Hospitalist Progress Note    10/6/2019  Admit Date: 10/4/2019  9:38 PM   NAME: Nicol Oliveira   :  1928   MRN:  [id number]   Attending: Karmen Camara DO  PCP:  Other, MD Waylon    SUBJECTIVE:   This is a 80-year-old female with past medical history significant for Alzheimer's dementia, hypertension, dyslipidemia, breast cancer status post left mastectomy, CKD unknown stage presented to the ER because of altered mental status. Patient's son states that she has been doing fairly well until 2 to 3 days ago when she stopped eating or drinking much. She was also very weak and unable to ambulate. At baseline patient is able to ambulate and will respond with audible noises. She lives at home with her  who is sick with fever. Per the son, the patient is not having any nausea or vomiting or diarrhea. No cough or shortness of breath. At the time of my evaluation patient is awake with eyes open but not responding to verbal stimuli. She is able to withdraw from pain.     ER course  Patient is afebrile and hemodynamically stable.     CBC with white cell count of 14, hemoglobin of 10.6 platelet count of 341     CMP with sodium of 158 potassium 5 chloride 126 bicarbonate of 25 BUN of 133 creatinine of 4.13 glucose of 137. Total bilirubin of 0.4 ALT 12 AST 16 alkaline phosphatase 16 procalcitonin of 0.2   urine analysis, with no signs of UTI.     CT scan of the head without contrast negative for acute intracranial abnormality.     Chest x-ray was personally reviewed by me showed no acute cardiopulmonary abnormality. There is thoracic aortic aneurysm versus ectasia no prior studies for comparison.     EKG showed normal sinus rhythm with no ST-T wave changes suggestive of ischemia.     IV Fluids NS bolus x1,      Patient admitted for further evaluation management of her acute metabolic encephalopathy acute kidney injury on chronic kidney disease and severe hypernatremia.     Interval History (10/6): patient examined at bedside. Opens eyes with heparin shots but does not follow and not interactive. Patient not alert or oriented at bedside but is not under any distress. Son at bedside. Review of Systems unable to be determined due to patient condition  PHYSICAL EXAM     Visit Vitals  /86   Pulse 69   Temp 97.6 °F (36.4 °C)   Resp 16   Ht 4' 11\" (1.499 m)   Wt 33.4 kg (73 lb 10.1 oz)   SpO2 95%   BMI 14.87 kg/m²      Temp (24hrs), Av.4 °F (36.9 °C), Min:97.1 °F (36.2 °C), Max:99.3 °F (37.4 °C)    Oxygen Therapy  O2 Sat (%): 95 % (10/06/19 1151)  Pulse via Oximetry: 90 beats per minute (10/05/19 0254)  O2 Device: Room air (10/04/19 2141)    Intake/Output Summary (Last 24 hours) at 10/6/2019 1359  Last data filed at 10/6/2019 1007  Gross per 24 hour   Intake 1375 ml   Output 900 ml   Net 475 ml      General: No acute distress, not alert or oriented  Lungs:  CTA Bilaterally.    Heart:  Regular rate and rhythm,  No murmur, rub, or gallop  Abdomen: Soft, Non distended, Non tender, Positive bowel sounds  Extremities: No cyanosis, clubbing or edema  Neurologic:  No focal deficits    ASSESSMENT      Active Hospital Problems    Diagnosis Date Noted    Adult failure to thrive 10/06/2019    Hypernatremia 10/04/2019    MATTHEW (acute kidney injury) ([facility] [address] 10/04/2019    Acute metabolic encephalopathy      Plan:    # Acute metabolic encephalopathy, likely multifactorial (Alzheimer's, MATTHEW, hypernatremia)  - CT head negative  - blood cultures x2 collected and pending  - will continue with Rocephin empirically for now  - MRI head pending  - fluid resuscitation, as below  - avoid sedating meds    # MATTHEW  - uncertain underlying baseline renal function  - nephrology consulted, appreciate recs  - renal ultrasound showing mild bilateral hydronephrosis, uncertain etiology  - continue with maintenance fluids  - avoid nephrotoxic meds  - patient's son states that if indicated, patient would NOT want dialysis    # Hypernatremia  - decline in serum sodium to 145 today  - switch back to D5-1/2NS to slow drop in sodium  - trend BMP  - goal decrease in serum sodium of 6-8 mEq/24 hours    # Adult failure to thrive  - likely due to above  - son unsure if patient would want NG tube or feeding tube    # HTN  - hold lisinopril and HCTZ due to MATTHEW    F/E/N: fluids as above, replete electrolytes, NPO    Ppx: heparin SQ for VTE    Code Status: DNR/DNI    Disposition: pending clinical improvement with plan as above. PT/OT consults should patient's mental status improve. Discussed with patient's son at bedside. All questions answered.      Signed By: Ana Morgan DO     October 6, 2019

## 2019-10-06 NOTE — PROGRESS NOTES
Dr Edilma Campoverde called into patient's room per spouses request and discussed options with spouse

## 2019-10-06 NOTE — PROGRESS NOTES
Patient occasionally opens eyes to tactile stimuli  Respirations unlabored  Continue iv fluid  Site without redness or edema  Riddle patent with clear yellow urine  Son at bedside

## 2019-10-06 NOTE — PROGRESS NOTES
Speech therapy note  Attempted to see pt this am, however, pt unable to awake.      Pardeep Serrano MA/VALENCIA/SLP

## 2019-10-06 NOTE — PROGRESS NOTES
CM met with patient with several family around the bedside. Patient lying in bed asleep and son assisted with intervention. Per son Lena Adair) patient has dementia and unable to care for herself. States that patient daughter lives with her and take care of patient. Son Lena Adair) states that patient was able to ambulate up to 2 to 3 days ago and she wasn't able to walk and stop eating. Son states that patient unable to do anything for herself. States that patient needs assistance with all her ADL's and do has several DME's in the home. States that he's working with a company trying to get assistance in the home to help with his parents. CM informed son Lena Adair) that we had resources available if any needed. Son states that patient will be returning back home with family. CM will continue to monitor. Care Management Interventions  Mode of Transport at Discharge:  Other (see comment)(Family)  Transition of Care Consult (CM Consult): Discharge Planning  Discharge Durable Medical Equipment: No(Per son (Rahel Addison) patient currently has DME's in the home such as wheelchair, power chair, BSC, shower chair,grab bars)  Occupational Therapy Consult: Yes  Speech Therapy Consult: Yes  Current Support Network: Own Home, Lives with Spouse(Daughter lives with patient)  Confirm Follow Up Transport: Family  Plan discussed with Pt/Family/Caregiver: Yes  Freedom of Choice Offered: Yes   Resource Information Provided?: No  Discharge Location  Discharge Placement: Home

## 2019-10-06 NOTE — PROGRESS NOTES
Opens eyes when given heparin injection  Does not follow  Respirations shallow  Son remains at bedside

## 2019-10-06 NOTE — PROGRESS NOTES
SBAR report received from [facility]. Pt is resting in bed with eyes closed. Does not open eyes to sound of voice. Does not speak but does move arms spontaneously. Riddle patent and draining. Family at bedside. Respirations even and unlabored. Does not appear to be in distress. Safety measures in place, bed low and locked, call light in reach.

## 2019-10-06 NOTE — PROGRESS NOTES
MRI attempted and unsuccessful. pt is ams and does not talk. Before starting the MRI,  I put a pulse ox since there was no way for her to tell me if she has any issues. Her O2 sat at high was 86 and went to 71. nurse said give 2 liters O2, still desat. Moved her to stretcher and put her almost 90 degrees and sat went to 100% with Oxygen 2 liters. sent pt back cannot lay flat. I was told nurse did not need ekg monitoring before pt in transit to MRI.   207pm 10-6-19 jh

## 2019-10-06 NOTE — PROGRESS NOTES
Much more awake this afternoon   Some following with eyes noted  Patient raised arm with fist earlier at   Continue  Iv fluid  Room air sat 97%  Riddle patent

## 2019-10-06 NOTE — PROGRESS NOTES
Patient is back from MRI  Transferred to bed from stretcher  Sat 100% on 2l with head of bed elevated  Room air sat 97 % with head of bed elevated

## 2019-10-06 NOTE — PROGRESS NOTES
[facility]        Subjective: A on CKD  Her son says that she is more responsive this am.    Review of Systems -   Can not be obtained due to pt's condition. Objective:    Vitals:    10/06/19 0010 10/06/19 0343 10/06/19 0352 10/06/19 0724   BP: 164/73 148/70  172/78   Pulse: 75 71  60   Resp: 19 18  20   Temp: 98.6 °F (37 °C) 98.5 °F (36.9 °C)  97.1 °F (36.2 °C)   SpO2: 95% 95%  97%   Weight:   33.4 kg (73 lb 10.1 oz)    Height:           PE  Gen: unresponsiive  CV:reg rate  Chest:clear  Abd: soft  Ext/Access: no edema       . LAB  Recent Labs     10/05/19  0959 10/04/19  2151   WBC 12.4* 14.1*   HGB 9.3* 10.6*   HCT 30.9* 35.4*    181     Recent Labs     10/06/19  0948 10/06/19  0551 10/05/19  2149  10/04/19  2241    145 152*   < > 158*   K 4.3 5.1 4.1   < > 5.0   * 118* 121*   < > 126*   CO2 20* 22 22   < > 25   GLU 99 116* 125*   < > 137*   BUN 71* 78* 87*   < > 133*   CREA 2.36* 2.49* 2.80*   < > 4.13*   CA 8.1* 7.8* 8.2*   < > 9.4   ALB  --   --   --   --  3.4   TBILI  --   --   --   --  0.4   ALT  --   --   --   --  12   SGOT  --   --   --   --  16    < > = values in this interval not displayed. Radiology    A/P:   Patient Active Problem List   Diagnosis Code    Hypernatremia E87.0    MATTHEW (acute kidney injury) ([facility] [address] N17.9    Acute metabolic encephalopathy E28.36     A on CKD3 - Creatinine is coming down. Will see where she levels off.  (unknown baseline ckd). Her renal US showed mild rt hydro. I am not sure of the significance of this . Would continue carefull hydration. Hypernatremia. - down to 145    HTN    Dementia.          Marvene Chroman, MD

## 2019-10-06 NOTE — PROGRESS NOTES
Patient is resting in bed with daughter at bedside. Respirations are even and unlabored. Riddle is intact. No distress at this time. Bed is low, locked and call light within reach.

## 2019-10-07 ENCOUNTER — HOSPICE ADMISSION (OUTPATIENT)
Dept: HOSPICE | Facility: HOSPICE | Age: 84
End: 2019-10-07
Payer: MEDICARE

## 2019-10-07 LAB
ANION GAP SERPL CALC-SCNC: 11 MMOL/L (ref 7–16)
ANION GAP SERPL CALC-SCNC: 7 MMOL/L (ref 7–16)
ANION GAP SERPL CALC-SCNC: 7 MMOL/L (ref 7–16)
ANION GAP SERPL CALC-SCNC: 8 MMOL/L (ref 7–16)
BUN SERPL-MCNC: 46 MG/DL (ref 8–23)
BUN SERPL-MCNC: 49 MG/DL (ref 8–23)
BUN SERPL-MCNC: 57 MG/DL (ref 8–23)
BUN SERPL-MCNC: 64 MG/DL (ref 8–23)
CALCIUM SERPL-MCNC: 7.7 MG/DL (ref 8.3–10.4)
CALCIUM SERPL-MCNC: 7.9 MG/DL (ref 8.3–10.4)
CALCIUM SERPL-MCNC: 8 MG/DL (ref 8.3–10.4)
CALCIUM SERPL-MCNC: 8.2 MG/DL (ref 8.3–10.4)
CHLORIDE SERPL-SCNC: 113 MMOL/L (ref 98–107)
CHLORIDE SERPL-SCNC: 115 MMOL/L (ref 98–107)
CHLORIDE SERPL-SCNC: 117 MMOL/L (ref 98–107)
CHLORIDE SERPL-SCNC: 118 MMOL/L (ref 98–107)
CO2 SERPL-SCNC: 19 MMOL/L (ref 21–32)
CO2 SERPL-SCNC: 19 MMOL/L (ref 21–32)
CO2 SERPL-SCNC: 22 MMOL/L (ref 21–32)
CO2 SERPL-SCNC: 24 MMOL/L (ref 21–32)
CREAT SERPL-MCNC: 1.87 MG/DL (ref 0.6–1)
CREAT SERPL-MCNC: 1.91 MG/DL (ref 0.6–1)
CREAT SERPL-MCNC: 2.03 MG/DL (ref 0.6–1)
CREAT SERPL-MCNC: 2.07 MG/DL (ref 0.6–1)
GLUCOSE SERPL-MCNC: 103 MG/DL (ref 65–100)
GLUCOSE SERPL-MCNC: 122 MG/DL (ref 65–100)
GLUCOSE SERPL-MCNC: 124 MG/DL (ref 65–100)
GLUCOSE SERPL-MCNC: 98 MG/DL (ref 65–100)
MM INDURATION POC: 0 MM (ref 0–5)
POTASSIUM SERPL-SCNC: 3.9 MMOL/L (ref 3.5–5.1)
POTASSIUM SERPL-SCNC: 4 MMOL/L (ref 3.5–5.1)
POTASSIUM SERPL-SCNC: 4 MMOL/L (ref 3.5–5.1)
POTASSIUM SERPL-SCNC: 4.4 MMOL/L (ref 3.5–5.1)
PPD POC: NEGATIVE NEGATIVE
SODIUM SERPL-SCNC: 144 MMOL/L (ref 136–145)
SODIUM SERPL-SCNC: 144 MMOL/L (ref 136–145)
SODIUM SERPL-SCNC: 145 MMOL/L (ref 136–145)
SODIUM SERPL-SCNC: 147 MMOL/L (ref 136–145)

## 2019-10-07 PROCEDURE — 36415 COLL VENOUS BLD VENIPUNCTURE: CPT

## 2019-10-07 PROCEDURE — 77030020250 HC SOL INJ D 5% LFCR 1000ML BG LF

## 2019-10-07 PROCEDURE — 74011000258 HC RX REV CODE- 258: Performed by: INTERNAL MEDICINE

## 2019-10-07 PROCEDURE — 74011250636 HC RX REV CODE- 250/636: Performed by: HOSPITALIST

## 2019-10-07 PROCEDURE — 80048 BASIC METABOLIC PNL TOTAL CA: CPT

## 2019-10-07 PROCEDURE — 74011250636 HC RX REV CODE- 250/636: Performed by: INTERNAL MEDICINE

## 2019-10-07 PROCEDURE — 77030020253 HC SOL INJ D545NS .05 DEX .45 SAL

## 2019-10-07 PROCEDURE — 94760 N-INVAS EAR/PLS OXIMETRY 1: CPT

## 2019-10-07 PROCEDURE — 65270000029 HC RM PRIVATE

## 2019-10-07 RX ORDER — DEXTROSE MONOHYDRATE 50 MG/ML
125 INJECTION, SOLUTION INTRAVENOUS CONTINUOUS
Status: DISCONTINUED | OUTPATIENT
Start: 2019-10-07 | End: 2019-10-08

## 2019-10-07 RX ADMIN — HEPARIN SODIUM 5000 UNITS: 5000 INJECTION INTRAVENOUS; SUBCUTANEOUS at 12:52

## 2019-10-07 RX ADMIN — Medication 10 ML: at 05:12

## 2019-10-07 RX ADMIN — HEPARIN SODIUM 5000 UNITS: 5000 INJECTION INTRAVENOUS; SUBCUTANEOUS at 05:12

## 2019-10-07 RX ADMIN — DEXTROSE MONOHYDRATE AND SODIUM CHLORIDE 125 ML/HR: 5; .45 INJECTION, SOLUTION INTRAVENOUS at 04:29

## 2019-10-07 RX ADMIN — DEXTROSE MONOHYDRATE 125 ML/HR: 5 INJECTION, SOLUTION INTRAVENOUS at 10:05

## 2019-10-07 RX ADMIN — Medication 10 ML: at 15:56

## 2019-10-07 RX ADMIN — DEXTROSE MONOHYDRATE 125 ML/HR: 5 INJECTION, SOLUTION INTRAVENOUS at 17:23

## 2019-10-07 RX ADMIN — HEPARIN SODIUM 5000 UNITS: 5000 INJECTION INTRAVENOUS; SUBCUTANEOUS at 20:00

## 2019-10-07 RX ADMIN — Medication 10 ML: at 21:15

## 2019-10-07 NOTE — PROGRESS NOTES
SPEECH PATHOLOGY NOTE:    Speech therapy consult received and appreciated. Attempted to see patient this AM for bedside swallow evaluation. Patient's son at bedside. Patient alert and responding to some yes/no questions via head nod. Nonverbal throughout. Not following directions. Would not open oral cavity to accept ice chip or water by teaspoon. Orally defensive bearing lips together with presentation. Will check back at later time/date.       Damián Jefferson MS, CCC-SLP

## 2019-10-07 NOTE — PROGRESS NOTES
BSR to [facility]. Pt remains minimally responsive, opens eyes to voice, does not follow commands, nonverbal. Family at bedside.

## 2019-10-07 NOTE — PROGRESS NOTES
SBAR report given to Desk. Respirations are even and unlabored. No distress at this time. Safety measures in place.

## 2019-10-07 NOTE — HOSPICE
Met with the family at the patient's bedside. Patient unable to participate in conversation d/t somnolence. Discussed Routine Hospice Care ([address]) with [facility]) as I was informed the patient's spouse wanted to take her home with hospice care. Discussed Hospice criteria in general and the Hospice Philosophy. Discussed how [facility] could support the family in the patient's care if she was admitted for [address] Brandon Mcrae the patient's son said that his dad, Guru Jeffery, can't take care of the patient my him self. Brandon Mcrae said that his sister lives with the patient and spouse, but she has a bad back. Brandon Mcrae said that his mother needed care 24/7. We discussed the [facility]) and [facility]) care. I told him the criteria for GIP care. I let the family know that the patient did not meet criteria for GIP care due to there is no symptom that is presently being managed that requires IV medications only. When Brandon Mcrae brought up that his mother was not eating or drinking. I told him about Roxanol and liquid Ativan. I also told him that at present there are no medications being administered to his mother for \"Symptom Management\". Brandon Mcrae stated that he understood and the family would hire a CARMELA Chopra for their mother. I gave him my business card and he said that he would call me after they hired someone. I informed the Floor GHISLAINE Bronson and I will follow-up with the family tomorrow.     Thank you for this referral,    María Hernandez RN, BSN  Community Nurse Liaison  [facility]  [phone]

## 2019-10-07 NOTE — PROGRESS NOTES
[facility] PROGRESS NOTE    Follow up for: MATTHEW on CKD    Subjective:   Patient seen and examined. Chart, notes, labs, imaging, results all reviewed.      ROS:  Unable to assess    Objective:   Exam:  Vitals:    10/06/19 1926 10/06/19 2246 10/07/19 0311 10/07/19 0708   BP: 130/60 137/63 122/59 120/58   Pulse: 63 68 60 (!) 52   Resp: 16 15 15 19   Temp: 97.2 °F (36.2 °C) 98 °F (36.7 °C) 97.7 °F (36.5 °C) 96.7 °F (35.9 °C)   SpO2: 98% 97% 97% 97%   Weight:   45.2 kg (99 lb 11.2 oz)    Height:             Intake/Output Summary (Last 24 hours) at 10/7/2019 1033  Last data filed at 10/7/2019 0803  Gross per 24 hour   Intake 2427 ml   Output 1150 ml   Net 1277 ml       Current Facility-Administered Medications   Medication Dose Route Frequency    dextrose 5% infusion  125 mL/hr IntraVENous CONTINUOUS    sodium chloride (NS) flush 5-40 mL  5-40 mL IntraVENous Q8H    sodium chloride (NS) flush 5-40 mL  5-40 mL IntraVENous PRN    acetaminophen (TYLENOL) tablet 650 mg  650 mg Oral Q4H PRN    ondansetron (ZOFRAN) injection 4 mg  4 mg IntraVENous Q4H PRN    bisacodyl (DULCOLAX) tablet 5 mg  5 mg Oral DAILY PRN    heparin (porcine) injection 5,000 Units  5,000 Units SubCUTAneous Q8H    hydrALAZINE (APRESOLINE) 20 mg/mL injection 10 mg  10 mg IntraVENous Q6H PRN    influenza vaccine 2019-20 (6 mos+)(PF) (FLUARIX/FLULAVAL/FLUZONE QUAD) injection 0.5 mL  0.5 mL IntraMUSCular PRIOR TO DISCHARGE       EXAM  GEN - nonverbal, no distress  CV - S1, S2, RRR, no rub, murmur, or gallop  Lung - clear to auscultation bilaterally  Abd - soft, nontender, BS present  Ext - no edema    Recent Labs     10/05/19  0959 10/04/19  2151   WBC 12.4* 14.1*   HGB 9.3* 10.6*   HCT 30.9* 35.4*    181        Recent Labs     10/07/19  0641 10/07/19  0120 10/06/19  1910   * 144 146*   K 4.0 4.4 3.8   * 117* 116*   CO2 22 19* 21   BUN 57* 64* 66*   CREA 2.03* 2.07* 2.20*   CA 7.7* 8.0* 7.9*   * 122* 129* Assessment and Plan:   MATTHEW on CKD stage 3  - unknown baseline CKD  - MATTHEW in setting of volume depletion, poor po intake, ACEI  - renal US showed mild right hydronehprosis. Not sure of the significance  - continue careful hydration  - creatinine continues to trend down. 4.13-->2.03 today    Hypernatremia  - volume depletion/free water deficit in setting of poor po intake  - continue hydration.  Improving    HTN  - holding ACEI  - BP ok    Alzheimer's Dementia    CADE Alves

## 2019-10-07 NOTE — PROGRESS NOTES
Hospitalist Progress Note    10/7/2019  Admit Date: 10/4/2019  9:38 PM   NAME: Nikky Reynoso   :  1928   MRN:  [id number]   Attending: Akiko Talbert DO  PCP:  Frankie, MD Waylon    SUBJECTIVE:   This is a 78-year-old female with past medical history significant for Alzheimer's dementia, hypertension, dyslipidemia, breast cancer status post left mastectomy, CKD unknown stage presented to the ER because of altered mental status. Patient's son states that she has been doing fairly well until 2 to 3 days ago when she stopped eating or drinking much. She was also very weak and unable to ambulate. At baseline patient is able to ambulate and will respond with audible noises. She lives at home with her  who is sick with fever. Per the son, the patient is not having any nausea or vomiting or diarrhea. No cough or shortness of breath. At the time of my evaluation patient is awake with eyes open but not responding to verbal stimuli. She is able to withdraw from pain.     ER course  Patient is afebrile and hemodynamically stable.     CBC with white cell count of 14, hemoglobin of 10.6 platelet count of 257     CMP with sodium of 158 potassium 5 chloride 126 bicarbonate of 25 BUN of 133 creatinine of 4.13 glucose of 137. Total bilirubin of 0.4 ALT 12 AST 16 alkaline phosphatase 16 procalcitonin of 0.2   urine analysis, with no signs of UTI.     CT scan of the head without contrast negative for acute intracranial abnormality.     Chest x-ray was personally reviewed by me showed no acute cardiopulmonary abnormality. There is thoracic aortic aneurysm versus ectasia no prior studies for comparison.     EKG showed normal sinus rhythm with no ST-T wave changes suggestive of ischemia.     IV Fluids NS bolus x1,      Patient admitted for further evaluation management of her acute metabolic encephalopathy acute kidney injury on chronic kidney disease and severe hypernatremia.     Interval History (10/7): patient examined at bedside. Opens eyes with heparin shots but does not follow and not interactive. Patient not alert or oriented at bedside but is not under any distress. Son at bedside. Review of Systems unable to be determined due to patient condition  PHYSICAL EXAM     Visit Vitals  /51   Pulse 60   Temp 98.1 °F (36.7 °C)   Resp 17   Ht 4' 11\" (1.499 m)   Wt 45.2 kg (99 lb 11.2 oz)   SpO2 94%   BMI 20.14 kg/m²      Temp (24hrs), Av.5 °F (36.4 °C), Min:96.7 °F (35.9 °C), Max:98.1 °F (36.7 °C)    Oxygen Therapy  O2 Sat (%): 94 % (10/07/19 1124)  Pulse via Oximetry: 90 beats per minute (10/05/19 0254)  O2 Device: Room air (10/04/19 2141)    Intake/Output Summary (Last 24 hours) at 10/7/2019 1516  Last data filed at 10/7/2019 1405  Gross per 24 hour   Intake 2427 ml   Output 1150 ml   Net 1277 ml      General: No acute distress, not alert or oriented  Lungs:  CTA Bilaterally.    Heart:  Regular rate and rhythm,  No murmur, rub, or gallop  Abdomen: Soft, Non distended, Non tender, Positive bowel sounds  Extremities: No cyanosis, clubbing or edema  Neurologic:  No focal deficits    ASSESSMENT      Active Hospital Problems    Diagnosis Date Noted    Adult failure to thrive 10/06/2019    Hypernatremia 10/04/2019    MATTHEW (acute kidney injury) ([facility] [address] 10/04/2019    Acute metabolic encephalopathy      Plan:    # Acute metabolic encephalopathy, likely multifactorial (Alzheimer's, MATTHEW, hypernatremia)  - patient more awake but not interactive and will not follow directions  - CT head negative  - blood cultures x2 collected and pending  - discontinue antibiotics  - fluid resuscitation, as below  - avoid sedating meds    # MATTHEW  - recovering overall  - uncertain underlying baseline renal function  - nephrology consulted, appreciate recs  - renal ultrasound showing mild bilateral hydronephrosis, uncertain etiology  - continue with maintenance fluids  - avoid nephrotoxic meds  - patient's son states that if indicated, patient would NOT want dialysis    # Hypernatremia  - switch back to D5-1/2NS to slow drop in sodium  - trend BMP  - goal decrease in serum sodium of 6-8 mEq/24 hours  - nephrology following    # Adult failure to thrive  - likely due to above  - patient more awake but not interactive and will not follow directions  - speech therapy should mental status improve    # HTN  - hold lisinopril and HCTZ due to MATTHEW    F/E/N: fluids as above, replete electrolytes, NPO    Ppx: heparin SQ for VTE    Code Status: DNR/DNI    Disposition: pending clinical improvement with plan as above. Family considering Hospice, consult placed today. PT/OT consults should patient's mental status improve. Discussed with patient's son at bedside. All questions answered.      Signed By: Ava Dupree DO     October 7, 2019

## 2019-10-07 NOTE — PROGRESS NOTES
Problem: General Medical Care Plan  Goal: *Vital signs within specified parameters  Outcome: Progressing Towards Goal  Goal: *Absence of infection signs and symptoms  Outcome: Progressing Towards Goal  Goal: *Skin integrity maintained  Outcome: Progressing Towards Goal       Patient opens eyes to voice and tracks RN with eyes. Does not follow commands, nonverbal. NAD, FLACC score 0. VSS this morning. Riddle patent. Pt remains NPO. Q2h turns continued for pressure relief.

## 2019-10-08 PROBLEM — F02.80 ALZHEIMER'S DEMENTIA WITHOUT BEHAVIORAL DISTURBANCE (HCC): Status: ACTIVE | Noted: 2019-10-08

## 2019-10-08 PROBLEM — D53.9 MACROCYTIC ANEMIA: Status: ACTIVE | Noted: 2019-10-08

## 2019-10-08 PROBLEM — E86.0 SEVERE DEHYDRATION: Status: ACTIVE | Noted: 2019-10-08

## 2019-10-08 PROBLEM — G30.9 ALZHEIMER'S DEMENTIA WITHOUT BEHAVIORAL DISTURBANCE (HCC): Status: ACTIVE | Noted: 2019-10-08

## 2019-10-08 PROBLEM — D72.829 LEUKOCYTOSIS: Status: ACTIVE | Noted: 2019-10-08

## 2019-10-08 LAB
ANION GAP SERPL CALC-SCNC: 9 MMOL/L (ref 7–16)
BUN SERPL-MCNC: 41 MG/DL (ref 8–23)
CALCIUM SERPL-MCNC: 8 MG/DL (ref 8.3–10.4)
CHLORIDE SERPL-SCNC: 112 MMOL/L (ref 98–107)
CO2 SERPL-SCNC: 21 MMOL/L (ref 21–32)
CREAT SERPL-MCNC: 1.71 MG/DL (ref 0.6–1)
GLUCOSE SERPL-MCNC: 116 MG/DL (ref 65–100)
POTASSIUM SERPL-SCNC: 3.6 MMOL/L (ref 3.5–5.1)
SODIUM SERPL-SCNC: 142 MMOL/L (ref 136–145)

## 2019-10-08 PROCEDURE — 36415 COLL VENOUS BLD VENIPUNCTURE: CPT

## 2019-10-08 PROCEDURE — 80048 BASIC METABOLIC PNL TOTAL CA: CPT

## 2019-10-08 PROCEDURE — 92610 EVALUATE SWALLOWING FUNCTION: CPT

## 2019-10-08 PROCEDURE — 77030020250 HC SOL INJ D 5% LFCR 1000ML BG LF

## 2019-10-08 PROCEDURE — 74011250636 HC RX REV CODE- 250/636: Performed by: HOSPITALIST

## 2019-10-08 PROCEDURE — 74011000258 HC RX REV CODE- 258: Performed by: INTERNAL MEDICINE

## 2019-10-08 PROCEDURE — 65270000029 HC RM PRIVATE

## 2019-10-08 PROCEDURE — 74011250636 HC RX REV CODE- 250/636: Performed by: INTERNAL MEDICINE

## 2019-10-08 PROCEDURE — 77030020253 HC SOL INJ D545NS .05 DEX .45 SAL

## 2019-10-08 RX ORDER — DEXTROSE MONOHYDRATE AND SODIUM CHLORIDE 5; .45 G/100ML; G/100ML
75 INJECTION, SOLUTION INTRAVENOUS CONTINUOUS
Status: DISCONTINUED | OUTPATIENT
Start: 2019-10-08 | End: 2019-10-10

## 2019-10-08 RX ADMIN — DEXTROSE MONOHYDRATE AND SODIUM CHLORIDE 75 ML/HR: 5; .45 INJECTION, SOLUTION INTRAVENOUS at 21:52

## 2019-10-08 RX ADMIN — HEPARIN SODIUM 5000 UNITS: 5000 INJECTION INTRAVENOUS; SUBCUTANEOUS at 11:01

## 2019-10-08 RX ADMIN — Medication 5 ML: at 05:16

## 2019-10-08 RX ADMIN — Medication 10 ML: at 21:53

## 2019-10-08 RX ADMIN — HEPARIN SODIUM 5000 UNITS: 5000 INJECTION INTRAVENOUS; SUBCUTANEOUS at 21:46

## 2019-10-08 RX ADMIN — DEXTROSE MONOHYDRATE 125 ML/HR: 5 INJECTION, SOLUTION INTRAVENOUS at 09:06

## 2019-10-08 RX ADMIN — HEPARIN SODIUM 5000 UNITS: 5000 INJECTION INTRAVENOUS; SUBCUTANEOUS at 04:18

## 2019-10-08 RX ADMIN — Medication 5 ML: at 13:58

## 2019-10-08 RX ADMIN — DEXTROSE MONOHYDRATE AND SODIUM CHLORIDE 75 ML/HR: 5; .45 INJECTION, SOLUTION INTRAVENOUS at 11:02

## 2019-10-08 RX ADMIN — DEXTROSE MONOHYDRATE 125 ML/HR: 5 INJECTION, SOLUTION INTRAVENOUS at 01:18

## 2019-10-08 NOTE — PROGRESS NOTES
Patient continues resting quietly, awake at intervals, does not talk or respond verbally, but appears to watch as care is given. One family member has remained through the night.

## 2019-10-08 NOTE — PROGRESS NOTES
SPEECH PATHOLOGY NOTE:      Attempted to see patient for bedside swallowing evaluation this AM. Patient sleeping upon arrival. Patient's son reports patient has been more alert in the afternoon and requested SLP return in PM if able. Will follow up at later time this date as patient is able to participate.       Damián Jefferson MS, CCC-SLP

## 2019-10-08 NOTE — PROGRESS NOTES
Problem: General Medical Care Plan  Goal: *Vital signs within specified parameters  Outcome: Progressing Towards Goal  Goal: *Labs within defined limits  Outcome: Progressing Towards Goal  Goal: *Absence of infection signs and symptoms  Outcome: Progressing Towards Goal  Goal: *Skin integrity maintained  10/8/2019 0940 by Deepak Wheeler RN  Outcome: Progressing Towards Goal  10/8/2019 0940 by Deepak Wheeler RN  Outcome: Progressing Towards Goal

## 2019-10-08 NOTE — PROGRESS NOTES
Patient resting in bed, intermittently alert, intermittently follows commands, nonverbal.    Pt ate ~25% dinner. 2x large loose bowel movements today. Prevalon boots placed for skin protection. CIVF infusing per MAR. Family at bedside.

## 2019-10-08 NOTE — PROGRESS NOTES
Patient withdrew when Heparin SC given, stated \" stop that, go away\"  More alert this am.  Will give report to oncoming RN

## 2019-10-08 NOTE — PROGRESS NOTES
Problem: Dysphagia (Adult)  Goal: *Acute Goals and Plan of Care (Insert Text)  Description  LTG: Patient will tolerate least restrictive diet without overt signs or symptoms of airway compromise. STG: Patient will tolerate puree diet/thin liquids without overt signs or symptoms of airway compromise. STG: Patient will tolerate chewable trials with no overt signs or symptoms of airway compromise for potential diet upgrade. Outcome: Progressing Towards Goal      SPEECH LANGUAGE PATHOLOGY: DYSPHAGIA- Initial Assessment    NAME/AGE/GENDER: Franny Villa is a 80 y.o. female  DATE: 10/8/2019  PRIMARY DIAGNOSIS: MATTHEW (acute kidney injury) ([facility] [address] [H73.4]  Acute metabolic encephalopathy [Z21.46]  Hypernatremia [E87.0]       ICD-10: Treatment Diagnosis: R13.11 Dysphagia, Oral Phase    INTERDISCIPLINARY COLLABORATION: Registered Nurse  PRECAUTIONS/ALLERGIES: Patient has no known allergies. SUBJECTIVE   Patient sleeping upon arrival; however, opened eyes to name. Positioned upright in bed for PO trials. No following commands, but alert throughout. Primarily non-verbal however, did appropriately ask, Juan Meyer is that? \" On one occasions referring to water. Family at bedside. Diet Prior to Evaluation: NPO     History of Present Injury/Illness: Ms. Sidhu Doctor  has no past medical history on file. . She also  has no past surgical history on file. Previous Dysphagia: NONE REPORTED    Problem List:  (Impairments causing functional limitations):  Oral dysphagia    Orientation:   Nonverbal      Pain: Pain Scale 1: FLACC  Pain Intensity 1: 0         OBJECTIVE   Oral Motor Assessment:  COMMENTS: Patient not following commands to complete oral mech exam. No facial or labial asymmetry at rest.      Swallow assessment:   Patient presented with water by teaspoon/straw/successive swallows, puree, and mechanical soft consistency trials. No overt s/sx airway compromise with any trials.  Laryngeal elevation present upon palpation. Patient not maintaining labial seal around edge of cup to accept cup sips of thin. Patient consumed approximately 4 oz thin by straw with no overt s/sx airway compromise. Oral prep time and oral clearance WNL with puree. Patient with delayed oral prep time with trials mechanical soft consistency. Munch/mash mastication pattern contributing to delay. Min-mild lingual residue post swallow. Able to effectively clear given liquid wash. ASSESSMENT   Patient presents with mild oral dysphagia. Recommend puree diet/thin liquids by straw. Medications crushed in puree. Will continue to follow for diet tolerance and potential diet upgrade. Ensure patient is alert and upright for all Po. Provided small bites/sips, alternate solids/liquids, and ensure oral cavity is clear after meals. Requires 1:1 assistance with meals. Tool Used: Dysphagia Outcome and Severity Scale (BERNARD)    Score Comments   Normal Diet  ? 7 With no strategies or extra time needed   Functional Swallow  ? 6 May have mild oral or pharyngeal delay   Mild Dysphagia  ? 5 Which may require one diet consistency restricted    Mild-Moderate Dysphagia  ? 4 With 1-2 diet consistencies restricted   Moderate Dysphagia  ? 3 With 2 or more diet consistencies restricted   Moderate-Severe Dysphagia  ? 2 With partial PO strategies (trials with ST only)   Severe Dysphagia  ? 1 With inability to tolerate any PO safely      Score:  Initial: 4 Most Recent:  (Date 10/08/19 )   Interpretation of Tool: The Dysphagia Outcome and Severity Scale (BERNARD) is a simple, easy-to-use, 7-point scale developed to systematically rate the functional severity of dysphagia based on objective assessment and make recommendations for diet level, independence level, and type of nutrition. Current Medications:   No current facility-administered medications on file prior to encounter. No current outpatient medications on file prior to encounter.          PLAN FREQUENCY/DURATION: Continue to follow patient 3 times a week for duration of hospital stay to address above goals. - Recommendations for next treatment session: Next treatment will address diet tolerance and potential diet advancement. REHABILITATION POTENTIAL FOR STATED GOALS: Fair     COMPLIANCE WITH PROGRAM/EXERCISES: Will assess as treatment progresses    CONTINUATION OF SKILLED SERVICES/MEDICAL NECESSITY:  Patient is expected to demonstrate progress in  swallow function, diet tolerance and swallow safety in order to  improve swallow safety, work toward diet advancement and decrease aspiration risk. Patient continues to require skilled intervention due to oral dysphagia. RECOMMENDATIONS   DIET:   PO:  Pureed  Liquids:  regular thin    MEDICATIONS: Crushed in puree     ASPIRATION PRECAUTIONS  Slow rate of intake  Small bites/sips  Upright at 90 degrees during meal     COMPENSATORY STRATEGIES/MODIFICATIONS  Alternate liquids/solids     EDUCATION:  Recommendations discussed with Nursing  Family  Patient     RECOMMENDATIONS for CONTINUED SPEECH THERAPY:   YES: Anticipate need for ongoing speech therapy during this hospitalization.        SAFETY:  After treatment position/precautions:  Upright in bed  RN notified  Family at bedside      Total Treatment Duration:   Time In: 6795  Time Out: 45 [facility], [address] [facility]

## 2019-10-08 NOTE — INTERDISCIPLINARY ROUNDS
Interdisciplinary team rounds were held 10/8/2019 with the following team members:Care Management, Physical Therapy, Physician and Clinical Coordinator and the patient. Plan of care discussed. See clinical pathway and/or care plan for interventions and desired outcomes.

## 2019-10-08 NOTE — PROGRESS NOTES
Hospitalist Progress Note    Patient: Issac Bonner MRN: [id number]  SSN: [id number]    YOB: 1928  Age: 80 y.o. Sex: female      Admit Date: 10/4/2019    LOS: 4 days     Subjective:     80year old CF with a PMH of end stage Alzheimers admitted with MATTHEW and hypernatremia due to dehydration from forgetting to eat and drink. 10/8 - She is drowsy but awakens easily. Confused. Review of systems negative except stated above. Objective:     Visit Vitals  /56 (BP 1 Location: Right arm, BP Patient Position: At rest)   Pulse (!) 56   Temp 98 °F (36.7 °C)   Resp 21   Ht 4' 11\" (1.499 m)   Wt 45.6 kg (100 lb 9.6 oz)   SpO2 96%   BMI 20.32 kg/m²      Oxygen Therapy  O2 Sat (%): 96 % (10/08/19 0714)  Pulse via Oximetry: 66 beats per minute (10/07/19 2116)  O2 Device: Room air (10/04/19 2141)      Intake and Output:     Intake/Output Summary (Last 24 hours) at 10/8/2019 0911  Last data filed at 10/8/2019 0608  Gross per 24 hour   Intake 1150 ml   Output 2400 ml   Net -1250 ml         Physical Exam:   GENERAL: drowsy, cooperative, no distress, appears stated age  EYE: conjunctivae/corneas clear. PERRL. THROAT & NECK: normal and no erythema or exudates noted. LUNG: clear to auscultation bilaterally  HEART: regular rate and rhythm, S1S2, no murmur, no JVD  ABDOMEN: soft, non-tender, non-distended. Bowel sounds normal.   EXTREMITIES:  No edema, 2+ pedal/radial pulses bilaterally  SKIN: no rash or abnormalities  NEUROLOGIC: Drowsy, oriented x 0. Cranial nerves 2-12 grossly intact.     Lab/Data Review:  Recent Results (from the past 24 hour(s))   METABOLIC PANEL, BASIC    Collection Time: 10/07/19  1:10 PM   Result Value Ref Range    Sodium 145 136 - 145 mmol/L    Potassium 4.0 3.5 - 5.1 mmol/L    Chloride 115 (H) 98 - 107 mmol/L    CO2 19 (L) 21 - 32 mmol/L    Anion gap 11 7 - 16 mmol/L    Glucose 124 (H) 65 - 100 mg/dL    BUN 49 (H) 8 - 23 MG/DL    Creatinine 1.91 (H) 0.6 - 1.0 MG/DL GFR est AA 32 (L) >60 ml/min/1.73m2    GFR est non-AA 26 (L) >60 ml/min/1.73m2    Calcium 7.9 (L) 8.3 - 55.8 MG/DL   METABOLIC PANEL, BASIC    Collection Time: 10/07/19  8:05 PM   Result Value Ref Range    Sodium 144 136 - 145 mmol/L    Potassium 3.9 3.5 - 5.1 mmol/L    Chloride 113 (H) 98 - 107 mmol/L    CO2 24 21 - 32 mmol/L    Anion gap 7 7 - 16 mmol/L    Glucose 98 65 - 100 mg/dL    BUN 46 (H) 8 - 23 MG/DL    Creatinine 1.87 (H) 0.6 - 1.0 MG/DL    GFR est AA 32 (L) >60 ml/min/1.73m2    GFR est non-AA 27 (L) >60 ml/min/1.73m2    Calcium 8.2 (L) 8.3 - 80.4 MG/DL   METABOLIC PANEL, BASIC    Collection Time: 10/08/19  4:07 AM   Result Value Ref Range    Sodium 142 136 - 145 mmol/L    Potassium 3.6 3.5 - 5.1 mmol/L    Chloride 112 (H) 98 - 107 mmol/L    CO2 21 21 - 32 mmol/L    Anion gap 9 7 - 16 mmol/L    Glucose 116 (H) 65 - 100 mg/dL    BUN 41 (H) 8 - 23 MG/DL    Creatinine 1.71 (H) 0.6 - 1.0 MG/DL    GFR est AA 36 (L) >60 ml/min/1.73m2    GFR est non-AA 30 (L) >60 ml/min/1.73m2    Calcium 8.0 (L) 8.3 - 10.4 MG/DL       Imaging:  Ct Head Wo Cont    Result Date: 10/4/2019  CT HEAD WITHOUT CONTRAST HISTORY:  Transient alteration of awareness. . COMPARISON: None. TECHNIQUE: Axial imaging was performed without intravenous contrast utilizing 5mm slice thickness. Sagittal and coronal reformats were performed. Radiation dose reduction techniques were used for this study. Our CT scanner uses one or all of the following: Automated exposure control, adjustment of the MAS or KUB according to patient's size and iterative reconstruction. FINDINGS:    *BRAIN:    -  There are no early signs of territorial or lacunar infarction by CT.    -  Symmetric supratentorial atrophy. -  For patient's age, the scattered areas of white matter hypodensities may represent a chronic small vessel white matter ischemia. However this is nonspecific. *VISUALIZED PARANASAL SINUSES: Well aerated. *MASTOIDS:  Clear.  *CALVARIUM AND SCALP: Unremarkable. IMPRESSION: No acute intracranial abnormalities. Date of Dictation: 10/4/2019 11:34 PM     [address]    Result Date: 10/5/2019  Renal ultrasound, 10/5/2019 History: Acute kidney insufficiency. Comparison: None. Technique: Grayscale and doppler images of the kidneys and bladder were obtained using a 3-5 MHz linear transducer. Findings: The right kidney measures 10.4 cm, and the left kidney measures approximately 10.2 cm in greatest longitudinal length. Mild hydronephrosis is suggested of the right kidney. No definite evidence of hydronephrosis is seen. The left kidney is poorly visualized with questionable mild hydronephrosis suggested on some images. Renal parenchymal echogenicity is increased consistent with chronic medical renal changes. An approximately 2 cm cyst is seen in the upper pole cortex of the right kidney. The urinary bladder is poorly distended and therefore not well evaluated although shows no obvious abnormality. Mild wall thickening is seen likely due to the poorly distended state. IMPRESSION:   1. Mild bilateral hydronephrosis suggested. The etiology is not clearly demonstrated by ultrasound. Xr Chest Port    Result Date: 10/6/2019  EXAM: XR CHEST PORT INDICATION: leukocytosis COMPARISON: 10/4/2019 FINDINGS: A portable AP radiograph of the chest was obtained at 0453 hours. The patient is on a cardiac monitor. The lungs are clear. The cardiac and mediastinal contours and pulmonary vascularity are normal.  The bones and soft tissues are grossly within normal limits. IMPRESSION: No acute cardiopulmonary disease. Xr Chest Port    Result Date: 10/4/2019  EXAM: XR CHEST PORT INDICATION: AMS COMPARISON: None. FINDINGS: A portable AP radiograph of the chest was obtained at 2216 hours. The patient is on a cardiac monitor. The lungs are clear. Thoracic aortic aneurysm versus ectasia. .  The bones and soft tissues are grossly within normal limits. IMPRESSION: Thoracic aortic aneurysm versus ectasia. There are no prior studies. No results found for this visit on 10/04/19. Cultures: All Micro Results     Procedure Component Value Units Date/Time    CULTURE, BLOOD [[id number]] Collected:  10/04/19 2205    Order Status:  Completed Specimen:  Blood Updated:  10/08/19 0832     Special Requests: --        LEFT  Antecubital       Culture result: NO GROWTH 4 DAYS       CULTURE, BLOOD [[id number]] Collected:  10/04/19 2213    Order Status:  Completed Specimen:  Blood Updated:  10/08/19 0832     Special Requests: --        RIGHT  Antecubital       Culture result: NO GROWTH 4 DAYS             Assessment/Plan:     Principal Problem:    MATTHEW (acute kidney injury) ([facility] [address] (10/4/2019)  - Resolving  - Continue IVFs  - Appreciate Nephrology's input    Active Problems:    Acute metabolic encephalopathy (77/1/7698)  - Slowly improving      Hypernatremia (10/4/2019)  - Due to dehydration  - Resolved      Severe dehydration (10/8/2019)  - Due to forgetting to eat and lack of participation in eating  - Improving on IVFs      Alzheimer's dementia without behavioral disturbance ([facility] [address] (10/8/2019)  - End stage --> forgetting to eat which seems hospice appropriate  - Hospice evaluated and said not a candidate because no pain or behavioral disturbance      Leukocytosis (10/8/2019)  - Likely reactive  - Cultures NGTD  - Continue to monitor      Adult failure to thrive (10/6/2019)      Macrocytic anemia (10/8/2019)  - Stable    Dispo - Discharge planning.      DIET NPO    DVT Prophylaxis: Heparin      Signed By: Rahat Cohen DO     October 8, 2019

## 2019-10-08 NOTE — PROGRESS NOTES
[facility] PROGRESS NOTE    Follow up for: MATTHWE on CKD    Subjective:   Patient seen and examined. Chart, notes, labs, imaging, results all reviewed. Opens eyes to voice. Confused. ROS:  Unable to assess    Objective:   Exam:  Vitals:    10/07/19 2116 10/08/19 0018 10/08/19 0349 10/08/19 0714   BP:  118/60 160/67 159/56   Pulse:  81 61 (!) 56   Resp:  19 19 21   Temp:  99.1 °F (37.3 °C) 98.8 °F (37.1 °C) 98 °F (36.7 °C)   SpO2: 98% 98% 97% 96%   Weight:   45.6 kg (100 lb 9.6 oz)    Height:             Intake/Output Summary (Last 24 hours) at 10/8/2019 1031  Last data filed at 10/8/2019 0608  Gross per 24 hour   Intake 1150 ml   Output 2400 ml   Net -1250 ml       Current Facility-Administered Medications   Medication Dose Route Frequency    dextrose 5% infusion  125 mL/hr IntraVENous CONTINUOUS    sodium chloride (NS) flush 5-40 mL  5-40 mL IntraVENous Q8H    sodium chloride (NS) flush 5-40 mL  5-40 mL IntraVENous PRN    acetaminophen (TYLENOL) tablet 650 mg  650 mg Oral Q4H PRN    ondansetron (ZOFRAN) injection 4 mg  4 mg IntraVENous Q4H PRN    bisacodyl (DULCOLAX) tablet 5 mg  5 mg Oral DAILY PRN    heparin (porcine) injection 5,000 Units  5,000 Units SubCUTAneous Q8H    hydrALAZINE (APRESOLINE) 20 mg/mL injection 10 mg  10 mg IntraVENous Q6H PRN    influenza vaccine 2019-20 (6 mos+)(PF) (FLUARIX/FLULAVAL/FLUZONE QUAD) injection 0.5 mL  0.5 mL IntraMUSCular PRIOR TO DISCHARGE       EXAM  GEN - nonverbal, no distress  CV - S1, S2, RRR, no rub, murmur, or gallop  Lung - clear to auscultation bilaterally  Abd - soft, nontender, BS present  Ext - no edema    No results for input(s): WBC, HGB, HCT, PLT, HGBEXT, HCTEXT, PLTEXT, HGBEXT, HCTEXT, PLTEXT in the last 72 hours.      Recent Labs     10/08/19  0407 10/07/19  2005 10/07/19  1310    144 145   K 3.6 3.9 4.0   * 113* 115*   CO2 21 24 19*   BUN 41* 46* 49*   CREA 1.71* 1.87* 1.91*   CA 8.0* 8.2* 7.9*   * 98 124* Assessment and Plan:   MATTHEW on CKD stage 3  - unknown baseline CKD  - MATTHEW in setting of volume depletion, poor po intake, ACEI  - renal US showed mild right hydronehprosis. Not sure of the significance  - improving with hydration  - creatinine continues to trend down. 4.13-->1.71 today    Hypernatremia  - volume depletion/free water deficit in setting of poor po intake  - resolved with hydration  - change fluids to d5 1/2 NS today. Stop when taking po better    HTN  - holding ACEI  - BP ok    Alzheimer's Dementia    Nothing to add from nephrology standpoint. Will sign off. Please call if needed.      Giovany Vazquez, ACNP

## 2019-10-09 LAB
ANION GAP SERPL CALC-SCNC: 8 MMOL/L (ref 7–16)
BACTERIA SPEC CULT: NORMAL
BACTERIA SPEC CULT: NORMAL
BUN SERPL-MCNC: 28 MG/DL (ref 8–23)
CALCIUM SERPL-MCNC: 8.2 MG/DL (ref 8.3–10.4)
CHLORIDE SERPL-SCNC: 114 MMOL/L (ref 98–107)
CO2 SERPL-SCNC: 21 MMOL/L (ref 21–32)
CREAT SERPL-MCNC: 1.68 MG/DL (ref 0.6–1)
GLUCOSE SERPL-MCNC: 102 MG/DL (ref 65–100)
POTASSIUM SERPL-SCNC: 3.8 MMOL/L (ref 3.5–5.1)
SERVICE CMNT-IMP: NORMAL
SERVICE CMNT-IMP: NORMAL
SODIUM SERPL-SCNC: 143 MMOL/L (ref 136–145)

## 2019-10-09 PROCEDURE — 74011000258 HC RX REV CODE- 258: Performed by: INTERNAL MEDICINE

## 2019-10-09 PROCEDURE — 65270000029 HC RM PRIVATE

## 2019-10-09 PROCEDURE — 77030020253 HC SOL INJ D545NS .05 DEX .45 SAL

## 2019-10-09 PROCEDURE — 92526 ORAL FUNCTION THERAPY: CPT

## 2019-10-09 PROCEDURE — 74011250636 HC RX REV CODE- 250/636: Performed by: HOSPITALIST

## 2019-10-09 PROCEDURE — 80048 BASIC METABOLIC PNL TOTAL CA: CPT

## 2019-10-09 PROCEDURE — 36415 COLL VENOUS BLD VENIPUNCTURE: CPT

## 2019-10-09 RX ADMIN — HEPARIN SODIUM 5000 UNITS: 5000 INJECTION INTRAVENOUS; SUBCUTANEOUS at 05:05

## 2019-10-09 RX ADMIN — Medication 10 ML: at 05:05

## 2019-10-09 RX ADMIN — Medication 10 ML: at 20:43

## 2019-10-09 RX ADMIN — HEPARIN SODIUM 5000 UNITS: 5000 INJECTION INTRAVENOUS; SUBCUTANEOUS at 12:10

## 2019-10-09 RX ADMIN — HEPARIN SODIUM 5000 UNITS: 5000 INJECTION INTRAVENOUS; SUBCUTANEOUS at 20:38

## 2019-10-09 RX ADMIN — DEXTROSE MONOHYDRATE AND SODIUM CHLORIDE 75 ML/HR: 5; .45 INJECTION, SOLUTION INTRAVENOUS at 11:04

## 2019-10-09 NOTE — PROGRESS NOTES
Problem: Dysphagia (Adult)  Goal: *Acute Goals and Plan of Care (Insert Text)  Description  LTG: Patient will tolerate least restrictive diet without overt signs or symptoms of airway compromise. STG: Patient will tolerate puree diet/thin liquids without overt signs or symptoms of airway compromise. STG: Patient will tolerate chewable trials with no overt signs or symptoms of airway compromise for potential diet upgrade. SPEECH LANGUAGE PATHOLOGY: DYSPHAGIA- Daily Note 1    NAME/AGE/GENDER: Peyton Maya is a 80 y.o. female  DATE: 10/9/2019  PRIMARY DIAGNOSIS: MATTHEW (acute kidney injury) ([facility] [address] [W12.7]  Acute metabolic encephalopathy [F10.36]  Hypernatremia [E87.0]      ICD-10: Treatment Diagnosis: R13.11 Dysphagia, Oral Phase    INTERDISCIPLINARY COLLABORATION: Registered Nurse  PRECAUTIONS/ALLERGIES: Patient has no known allergies. SUBJECTIVE   Alert upright in bed. Patient's son feeding patient breakfast meal upon arrival. Patient nonverbal this date. Orientation:   Patient nonverbal    Pain: Pain Scale 1: FLACC  Pain Intensity 1: 0     OBJECTIVE   Patient seen for diet tolerance. No overt s/sx airway compromise with 1/2 teaspoon bites puree across 6 trials and 4 oz thin liquids by straw. Patient with mild-mod oral holding of puree bolus. Patient did not respond to verbal cues to clear oral cavity; however, when given liquid wash patient effectively cleared oral cavity. Chewable trials not presented this date due to increased oral holding. Educated patient's son on the following safe swallowing guidelines to utilize when feeding patient: patient upright and alert for all PO, provided small bits/sips, alternate solid/liquids, and ensure oral cavity is clear between bites and at end of meal. Patient's son expressed understanding. ASSESSMENT   Patient continues to present with mild-moderate oral dysphagia complicated by oral holding of puree.  No overt s/sx airway compromise with puree diet and thin liquids. Continue prescribed diet. Will continue to follow for diet tolerances and diet advancement. Tool Used: Dysphagia Outcome and Severity Scale (BERNARD)    Score Comments   Normal Diet  [] 7 With no strategies or extra time needed   Functional Swallow  [] 6 May have mild oral or pharyngeal delay   Mild Dysphagia  [] 5 Which may require one diet consistency restricted    Mild-Moderate Dysphagia  [] 4 With 1-2 diet consistencies restricted   Moderate Dysphagia  [] 3 With 2 or more diet consistencies restricted   Moderate-Severe Dysphagia  [] 2 With partial PO strategies (trials with ST only)   Severe Dysphagia  [] 1 With inability to tolerate any PO safely      Score:  Initial: 4 Most Recent: 4 (Date 10/09/19 )   Interpretation of Tool: The Dysphagia Outcome and Severity Scale (BERNARD) is a simple, easy-to-use, 7-point scale developed to systematically rate the functional severity of dysphagia based on objective assessment and make recommendations for diet level, independence level, and type of nutrition. PLAN    FREQUENCY/DURATION: Continue to follow patient 3 times a week for duration of hospital stay to address above goals. - Recommendations for next treatment session: Next treatment will address diet tolerance and potential diet advancement. REHABILITATION POTENTIAL FOR STATED GOALS: Good     COMPLIANCE WITH PROGRAM/EXERCISES: Will assess as treatment progresses    CONTINUATION OF SKILLED SERVICES/MEDICAL NECESSITY:   Patient is expected to demonstrate progress in  swallow function, diet tolerance and swallow safety in order to  improve swallow safety, work toward diet advancement and decrease aspiration risk.  Patient continues to require skilled intervention due to oral dysphagia.           RECOMMENDATIONS   DIET:    continue prescribed diet    MEDICATIONS: Crushed in puree     ASPIRATION PRECAUTIONS  · Slow rate of intake  · Small bites/sips  · Upright at 90 degrees during meal     COMPENSATORY STRATEGIES/MODIFICATIONS  · Alternate liquids/solids  · Assess oral cavity after meals     EDUCATION:  · Recommendations discussed with Nursing  · Family  · Patient     RECOMMENDATIONS for CONTINUED SPEECH THERAPY: YES: Anticipate need for ongoing speech therapy during this hospitalization.          SAFETY:  After treatment position/precautions:  · Upright in bed  · RN notified  · Son at bedside    Total Treatment Duration:   Time In: 2500  Time Out: [address], [address] [facility]

## 2019-10-09 NOTE — PROGRESS NOTES
Pt resting in bed with son at bedside. Pt alert but doesn't when spoken to. Son report pt has been this way for this was for a few years now. Pt on RA at this time. Pt has SCDs in place. Pt and son encouraged to call for assistance if needed call light in reach, door open will monitor.

## 2019-10-09 NOTE — PROGRESS NOTES
Patient lying in bed. Respirations are even and unlabored. continuous IV fluid infusing. Riddle is intact. No distress at this time. Bed is low, locked and call light within reach.

## 2019-10-09 NOTE — PROGRESS NOTES
CM met with rafy Chin at bedside to discuss the exit plan. Rafy Walden) states that he has been speaking with Radha Fostererd from [address] to set-up services in the home. CM made an attempt to make contact with Joshua and left voicemail. Rafy Walden) interested in [facility] for assisting with caregiver in the home. CM made contact with [facility] and provided the contact information for rafy Chin. CM will continue to monitor. Care Management Interventions  Mode of Transport at Discharge:  Other (see comment)(Family)  Transition of Care Consult (CM Consult): Discharge Planning  Discharge Durable Medical Equipment: No(Per son (Estefany Moyer) patient currently has DME's in the home such as wheelchair, power chair, BSC, shower chair,grab bars)  Occupational Therapy Consult: Yes  Speech Therapy Consult: Yes  Current Support Network: Own Home, Lives with Spouse(Daughter lives with patient)  Confirm Follow Up Transport: Family  Plan discussed with Pt/Family/Caregiver: Yes  Freedom of Choice Offered: Yes   Resource Information Provided?: No  Discharge Location  Discharge Placement: Home

## 2019-10-09 NOTE — PROGRESS NOTES
Bedside report received from [address] [place] [address]. Assessment completed. Bed is in low and locked position with floor free of objects. Patient is alert with confusion. Pt is resting quietly in bed. Family at bedside. No needs noted at present. Respirations even and non labored. Family verbalized understanding to call for needs. Call light within reach.

## 2019-10-09 NOTE — PROGRESS NOTES
Hospitalist Progress Note    Patient: Christine Escamilla MRN: [id number]  SSN: [id number]    YOB: 1928  Age: 80 y.o. Sex: female      Admit Date: 10/4/2019    LOS: 5 days     Subjective:     80year old CF with a PMH of end stage Alzheimers admitted with MATTHEW and hypernatremia due to dehydration from forgetting to eat and drink. 10/9 - She is harder to wake up today. Stares when awoken. Confused. Review of systems negative except stated above. Objective:     Visit Vitals  /61   Pulse 85   Temp 98.7 °F (37.1 °C)   Resp 20   Ht 4' 11\" (1.499 m)   Wt 45.6 kg (100 lb 9.6 oz)   SpO2 99%   BMI 20.32 kg/m²      Oxygen Therapy  O2 Sat (%): 99 % (10/09/19 1131)  Pulse via Oximetry: 66 beats per minute (10/07/19 2116)  O2 Device: Room air (10/04/19 2141)      Intake and Output:     Intake/Output Summary (Last 24 hours) at 10/9/2019 1334  Last data filed at 10/9/2019 0958  Gross per 24 hour   Intake 1258 ml   Output 1300 ml   Net -42 ml         Physical Exam:   GENERAL: drowsy, cooperative, no distress, appears stated age  EYE: conjunctivae/corneas clear. PERRL. THROAT & NECK: normal and no erythema or exudates noted. LUNG: clear to auscultation bilaterally  HEART: regular rate and rhythm, S1S2, no murmur, no JVD  ABDOMEN: soft, non-tender, non-distended. Bowel sounds normal.   EXTREMITIES:  No edema, 2+ pedal/radial pulses bilaterally  SKIN: no rash or abnormalities  NEUROLOGIC: Drowsy, oriented x 0. Cranial nerves 2-12 grossly intact.     Lab/Data Review:  Recent Results (from the past 24 hour(s))   METABOLIC PANEL, BASIC    Collection Time: 10/09/19  5:32 AM   Result Value Ref Range    Sodium 143 136 - 145 mmol/L    Potassium 3.8 3.5 - 5.1 mmol/L    Chloride 114 (H) 98 - 107 mmol/L    CO2 21 21 - 32 mmol/L    Anion gap 8 7 - 16 mmol/L    Glucose 102 (H) 65 - 100 mg/dL    BUN 28 (H) 8 - 23 MG/DL    Creatinine 1.68 (H) 0.6 - 1.0 MG/DL    GFR est AA 37 (L) >60 ml/min/1.73m2    GFR est non-AA 30 (L) >60 ml/min/1.73m2    Calcium 8.2 (L) 8.3 - 10.4 MG/DL       Imaging:  Ct Head Wo Cont    Result Date: 10/4/2019  CT HEAD WITHOUT CONTRAST HISTORY:  Transient alteration of awareness. . COMPARISON: None. TECHNIQUE: Axial imaging was performed without intravenous contrast utilizing 5mm slice thickness. Sagittal and coronal reformats were performed. Radiation dose reduction techniques were used for this study. Our CT scanner uses one or all of the following: Automated exposure control, adjustment of the MAS or KUB according to patient's size and iterative reconstruction. FINDINGS:    *BRAIN:    -  There are no early signs of territorial or lacunar infarction by CT.    -  Symmetric supratentorial atrophy. -  For patient's age, the scattered areas of white matter hypodensities may represent a chronic small vessel white matter ischemia. However this is nonspecific. *VISUALIZED PARANASAL SINUSES: Well aerated. *MASTOIDS:  Clear. *CALVARIUM AND SCALP: Unremarkable. IMPRESSION: No acute intracranial abnormalities. Date of Dictation: 10/4/2019 11:34 PM     [address]    Result Date: 10/5/2019  Renal ultrasound, 10/5/2019 History: Acute kidney insufficiency. Comparison: None. Technique: Grayscale and doppler images of the kidneys and bladder were obtained using a 3-5 MHz linear transducer. Findings: The right kidney measures 10.4 cm, and the left kidney measures approximately 10.2 cm in greatest longitudinal length. Mild hydronephrosis is suggested of the right kidney. No definite evidence of hydronephrosis is seen. The left kidney is poorly visualized with questionable mild hydronephrosis suggested on some images. Renal parenchymal echogenicity is increased consistent with chronic medical renal changes. An approximately 2 cm cyst is seen in the upper pole cortex of the right kidney.  The urinary bladder is poorly distended and therefore not well evaluated although shows no obvious abnormality. Mild wall thickening is seen likely due to the poorly distended state. IMPRESSION:   1. Mild bilateral hydronephrosis suggested. The etiology is not clearly demonstrated by ultrasound. Xr Chest Port    Result Date: 10/6/2019  EXAM: XR CHEST PORT INDICATION: leukocytosis COMPARISON: 10/4/2019 FINDINGS: A portable AP radiograph of the chest was obtained at 0453 hours. The patient is on a cardiac monitor. The lungs are clear. The cardiac and mediastinal contours and pulmonary vascularity are normal.  The bones and soft tissues are grossly within normal limits. IMPRESSION: No acute cardiopulmonary disease. Xr Chest Port    Result Date: 10/4/2019  EXAM: XR CHEST PORT INDICATION: AMS COMPARISON: None. FINDINGS: A portable AP radiograph of the chest was obtained at 2216 hours. The patient is on a cardiac monitor. The lungs are clear. Thoracic aortic aneurysm versus ectasia. .  The bones and soft tissues are grossly within normal limits. IMPRESSION: Thoracic aortic aneurysm versus ectasia. There are no prior studies. No results found for this visit on 10/04/19. Cultures:   All Micro Results     Procedure Component Value Units Date/Time    CULTURE, BLOOD [[id number]] Collected:  10/04/19 2213    Order Status:  Completed Specimen:  Blood Updated:  10/09/19 0949     Special Requests: --        RIGHT  Antecubital       Culture result: NO GROWTH 5 DAYS       CULTURE, BLOOD [[id number]] Collected:  10/04/19 2205    Order Status:  Completed Specimen:  Blood Updated:  10/09/19 0949     Special Requests: --        LEFT  Antecubital       Culture result: NO GROWTH 5 DAYS             Assessment/Plan:     Principal Problem:    MATTHEW (acute kidney injury) ([facility] [address] (10/4/2019)  - Resolving  - Continue IVFs  - Appreciate Nephrology's input    Active Problems:    Acute metabolic encephalopathy (23/6/1925)  - Slowly improving      Hypernatremia (10/4/2019)  - Due to dehydration  - Resolved      Severe dehydration (10/8/2019)  - Due to forgetting to eat and lack of participation in eating  - Improving on IVFs      Alzheimer's dementia without behavioral disturbance ([facility] [address] (10/8/2019)  - End stage --> forgetting to eat which seems hospice appropriate  - Hospice evaluated and said not a candidate because no pain or behavioral disturbance      Leukocytosis (10/8/2019)  - Likely reactive  - Cultures NGTD  - Continue to monitor      Adult failure to thrive (10/6/2019)      Macrocytic anemia (10/8/2019)  - Stable    Dispo - Discharge planning. DIET PUREED  DIET NUTRITIONAL SUPPLEMENTS All Meals;  Ensure [employer]  DIET NUTRITIONAL SUPPLEMENTS Lunch, Dinner; Magic Cups    DVT Prophylaxis: Heparin      Signed By: Trice Brunson DO     October 9, 2019

## 2019-10-09 NOTE — PROGRESS NOTES
Problem: Pressure Injury - Risk of  Goal: *Prevention of pressure injury  Description  Document Antoine Scale and appropriate interventions in the flowsheet.   Outcome: Progressing Towards Goal  Note:   Pressure Injury Interventions:  Sensory Interventions: Assess changes in LOC, Keep linens dry and wrinkle-free    Moisture Interventions: Absorbent underpads, Internal/External urinary devices    Activity Interventions: Pressure redistribution bed/mattress(bed type)    Mobility Interventions: Pressure redistribution bed/mattress (bed type)    Nutrition Interventions: Discuss nutritional consult with provider    Friction and Shear Interventions: Lift sheet                Problem: Patient Education: Go to Patient Education Activity  Goal: Patient/Family Education  Outcome: Progressing Towards Goal

## 2019-10-09 NOTE — PROGRESS NOTES
SBAR report given to oncoming nurse. Respirations are even and unlabored. No distress at this time. Safety measures in place.

## 2019-10-09 NOTE — PROGRESS NOTES
Nutrition  Reason for assessment: Referral nurse generated consult for other: \"request dietary supplement\"   Assessment:   Diet: DIET PUREED    Food/Nutrition Patient History:  PMH: Dementia, HTN, HLD, CKD, breast cancer  Presented with decreased alertness and no po intake x 2 days. Findings of hypernatremia and MATTHEW d/t severe volume depletion. Pt seen in company of son. Pt does not speak. Son reports pt was eating 3 meal and a snack to include 1 bottle of Ensure Plus a day up until about 1 week ago. She suddenly stopped eating and drinking but has started to improve with her po intake today. She only ate bites of food but drank 3 juices this morning. SLP recommended pureed with thin liquids  Anthropometrics:Height: 4' 11\" (149.9 cm),  Weight: 45.6 kg (100 lb 9.6 oz), Weight Source: Bed, Body mass index is 20.32 kg/m². BMI class of underweight. Macronutrient needs: IBW=43.2 kg  EER:  5772-7662 kcal /day (30-35 kcal/kg IBW)  EPR:  43-56 grams protein/day (1-1.3 grams/kg IBW)  Intake/Comparative Standards: Average intake for past 5 day(s)/9 recorded meal(s): 5%. This meets <10% of kcal and <10% of protein needs    Nutrition Diagnosis: Inadequate oral intake r/t AMS with associated decreased ability to consume sufficient oral intake as evidenced by intake as above    Intervention:  Meals and snacks: Continue current diet. Progress as appropriate per SLP recommendation. .  Nutrition Supplement Therapy: Ensure Enlive tid. Magic cup bid. Education/Discharge nutrition plan: Suggested increasing Ensure Plus to 3 bottles a day if po intake of food remains minimal. Son voiced understanding.     Chitra Moliname, [address], [address] [place] [address]

## 2019-10-09 NOTE — PROGRESS NOTES
Pt resting in bed. Pt ate approximately 10  % of lunch but drank her ensure. Pt has no acute distress noted at this time. Call light in reach, door open will monitor. Family at bedside.

## 2019-10-09 NOTE — PROGRESS NOTES
Pt resting in bed. Pt on RA. Pt alert but doesn't speak with nurse. Family at bedside. Pt and family encouraged to call for assistance if needed call light in reach, door open will monitor.

## 2019-10-09 NOTE — HOSPICE
Met with the spouse and the daughter at the patients bedside. The family are meeting with 2 agencies tomorrow to hire Full Time Care Givers in the patient's home. We will follow-up with the family tomorrow.     Thank you,    Roberto Colon RN, BSN  Community Nurse Liaison  [facility]  [phone]

## 2019-10-10 LAB
ANION GAP SERPL CALC-SCNC: 7 MMOL/L (ref 7–16)
BUN SERPL-MCNC: 26 MG/DL (ref 8–23)
CALCIUM SERPL-MCNC: 8.2 MG/DL (ref 8.3–10.4)
CHLORIDE SERPL-SCNC: 109 MMOL/L (ref 98–107)
CO2 SERPL-SCNC: 25 MMOL/L (ref 21–32)
CREAT SERPL-MCNC: 1.6 MG/DL (ref 0.6–1)
GLUCOSE SERPL-MCNC: 143 MG/DL (ref 65–100)
POTASSIUM SERPL-SCNC: 3.7 MMOL/L (ref 3.5–5.1)
SODIUM SERPL-SCNC: 141 MMOL/L (ref 136–145)

## 2019-10-10 PROCEDURE — 36415 COLL VENOUS BLD VENIPUNCTURE: CPT

## 2019-10-10 PROCEDURE — 77030020253 HC SOL INJ D545NS .05 DEX .45 SAL

## 2019-10-10 PROCEDURE — 74011250636 HC RX REV CODE- 250/636: Performed by: HOSPITALIST

## 2019-10-10 PROCEDURE — 80048 BASIC METABOLIC PNL TOTAL CA: CPT

## 2019-10-10 PROCEDURE — 65270000029 HC RM PRIVATE

## 2019-10-10 PROCEDURE — 74011000258 HC RX REV CODE- 258: Performed by: INTERNAL MEDICINE

## 2019-10-10 RX ADMIN — Medication 10 ML: at 21:28

## 2019-10-10 RX ADMIN — HEPARIN SODIUM 5000 UNITS: 5000 INJECTION INTRAVENOUS; SUBCUTANEOUS at 11:27

## 2019-10-10 RX ADMIN — DEXTROSE MONOHYDRATE AND SODIUM CHLORIDE 75 ML/HR: 5; .45 INJECTION, SOLUTION INTRAVENOUS at 00:42

## 2019-10-10 RX ADMIN — HEPARIN SODIUM 5000 UNITS: 5000 INJECTION INTRAVENOUS; SUBCUTANEOUS at 21:24

## 2019-10-10 RX ADMIN — Medication 10 ML: at 04:25

## 2019-10-10 RX ADMIN — HEPARIN SODIUM 5000 UNITS: 5000 INJECTION INTRAVENOUS; SUBCUTANEOUS at 04:24

## 2019-10-10 NOTE — PROGRESS NOTES
Hospitalist Progress Note    Patient: Roberto Calhoun MRN: [id number]  SSN: [id number]    YOB: 1928  Age: 80 y.o. Sex: female      Admit Date: 10/4/2019    LOS: 6 days     Subjective:     80year old CF with a PMH of end stage Alzheimers admitted with MATTHEW and hypernatremia due to dehydration from forgetting to eat and drink. 10/10 - Drowsy. Ate a better breakfast per her son. Review of systems negative except stated above. Objective:     Visit Vitals  /63   Pulse 70   Temp 98.5 °F (36.9 °C)   Resp 18   Ht 4' 11\" (1.499 m)   Wt 48.6 kg (107 lb 3.2 oz)   SpO2 96%   BMI 21.65 kg/m²      Oxygen Therapy  O2 Sat (%): 96 % (10/10/19 1527)  Pulse via Oximetry: 66 beats per minute (10/07/19 2116)  O2 Device: Room air (10/04/19 2141)      Intake and Output:     Intake/Output Summary (Last 24 hours) at 10/10/2019 1724  Last data filed at 10/10/2019 1004  Gross per 24 hour   Intake 360 ml   Output 625 ml   Net -265 ml         Physical Exam:   GENERAL: drowsy, cooperative, no distress, appears stated age  EYE: conjunctivae/corneas clear. PERRL. THROAT & NECK: normal and no erythema or exudates noted. LUNG: clear to auscultation bilaterally  HEART: regular rate and rhythm, S1S2, no murmur, no JVD  ABDOMEN: soft, non-tender, non-distended. Bowel sounds normal.   EXTREMITIES:  No edema, 2+ pedal/radial pulses bilaterally  SKIN: no rash or abnormalities  NEUROLOGIC: Drowsy, oriented x 0. Cranial nerves 2-12 grossly intact.     Lab/Data Review:  Recent Results (from the past 24 hour(s))   METABOLIC PANEL, BASIC    Collection Time: 10/10/19  3:56 AM   Result Value Ref Range    Sodium 141 136 - 145 mmol/L    Potassium 3.7 3.5 - 5.1 mmol/L    Chloride 109 (H) 98 - 107 mmol/L    CO2 25 21 - 32 mmol/L    Anion gap 7 7 - 16 mmol/L    Glucose 143 (H) 65 - 100 mg/dL    BUN 26 (H) 8 - 23 MG/DL    Creatinine 1.60 (H) 0.6 - 1.0 MG/DL    GFR est AA 39 (L) >60 ml/min/1.73m2    GFR est non-AA 32 (L) >60 ml/min/1.73m2    Calcium 8.2 (L) 8.3 - 10.4 MG/DL       Imaging:  Ct Head Wo Cont    Result Date: 10/4/2019  CT HEAD WITHOUT CONTRAST HISTORY:  Transient alteration of awareness. . COMPARISON: None. TECHNIQUE: Axial imaging was performed without intravenous contrast utilizing 5mm slice thickness. Sagittal and coronal reformats were performed. Radiation dose reduction techniques were used for this study. Our CT scanner uses one or all of the following: Automated exposure control, adjustment of the MAS or KUB according to patient's size and iterative reconstruction. FINDINGS:    *BRAIN:    -  There are no early signs of territorial or lacunar infarction by CT.    -  Symmetric supratentorial atrophy. -  For patient's age, the scattered areas of white matter hypodensities may represent a chronic small vessel white matter ischemia. However this is nonspecific. *VISUALIZED PARANASAL SINUSES: Well aerated. *MASTOIDS:  Clear. *CALVARIUM AND SCALP: Unremarkable. IMPRESSION: No acute intracranial abnormalities. Date of Dictation: 10/4/2019 11:34 PM     [address]    Result Date: 10/5/2019  Renal ultrasound, 10/5/2019 History: Acute kidney insufficiency. Comparison: None. Technique: Grayscale and doppler images of the kidneys and bladder were obtained using a 3-5 MHz linear transducer. Findings: The right kidney measures 10.4 cm, and the left kidney measures approximately 10.2 cm in greatest longitudinal length. Mild hydronephrosis is suggested of the right kidney. No definite evidence of hydronephrosis is seen. The left kidney is poorly visualized with questionable mild hydronephrosis suggested on some images. Renal parenchymal echogenicity is increased consistent with chronic medical renal changes. An approximately 2 cm cyst is seen in the upper pole cortex of the right kidney. The urinary bladder is poorly distended and therefore not well evaluated although shows no obvious abnormality.  Mild wall thickening is seen likely due to the poorly distended state. IMPRESSION:   1. Mild bilateral hydronephrosis suggested. The etiology is not clearly demonstrated by ultrasound. Xr Chest Port    Result Date: 10/6/2019  EXAM: XR CHEST PORT INDICATION: leukocytosis COMPARISON: 10/4/2019 FINDINGS: A portable AP radiograph of the chest was obtained at 0453 hours. The patient is on a cardiac monitor. The lungs are clear. The cardiac and mediastinal contours and pulmonary vascularity are normal.  The bones and soft tissues are grossly within normal limits. IMPRESSION: No acute cardiopulmonary disease. Xr Chest Port    Result Date: 10/4/2019  EXAM: XR CHEST PORT INDICATION: AMS COMPARISON: None. FINDINGS: A portable AP radiograph of the chest was obtained at 2216 hours. The patient is on a cardiac monitor. The lungs are clear. Thoracic aortic aneurysm versus ectasia. .  The bones and soft tissues are grossly within normal limits. IMPRESSION: Thoracic aortic aneurysm versus ectasia. There are no prior studies. No results found for this visit on 10/04/19. Cultures:   All Micro Results     Procedure Component Value Units Date/Time    CULTURE, BLOOD [[id number]] Collected:  10/04/19 2213    Order Status:  Completed Specimen:  Blood Updated:  10/09/19 0949     Special Requests: --        RIGHT  Antecubital       Culture result: NO GROWTH 5 DAYS       CULTURE, BLOOD [[id number]] Collected:  10/04/19 2205    Order Status:  Completed Specimen:  Blood Updated:  10/09/19 0949     Special Requests: --        LEFT  Antecubital       Culture result: NO GROWTH 5 DAYS             Assessment/Plan:     Principal Problem:    MATTHEW (acute kidney injury) ([facility] [address] (10/4/2019)  - Resolving  - Stop IVFs  - Appreciate Nephrology's input    Active Problems:    Acute metabolic encephalopathy (03/7/8961)  - Slowly improving      Hypernatremia (10/4/2019)  - Due to dehydration  - Resolved      Severe dehydration (10/8/2019)  - Due to forgetting to eat and lack of participation in eating  - Improving on IVFs      Alzheimer's dementia without behavioral disturbance ([facility] [address] (10/8/2019)  - End stage --> forgetting to eat which seems hospice appropriate  - Hospice evaluated and said not a candidate because no pain or behavioral disturbance      Leukocytosis (10/8/2019)  - Likely reactive  - Cultures NGTD  - Continue to monitor      Adult failure to thrive (10/6/2019)      Macrocytic anemia (10/8/2019)  - Stable    Dispo - Discharge planning. DIET PUREED  DIET NUTRITIONAL SUPPLEMENTS All Meals;  Ensure [employer]  DIET NUTRITIONAL SUPPLEMENTS Lunch, Dinner; Magic Cups    DVT Prophylaxis: Heparin      Signed By: Lonnie Franco DO     October 10, 2019

## 2019-10-10 NOTE — PROGRESS NOTES
Pt sitting up in bed. Family at bedside. Pt alert but confused. Call light in reach, door open will monitor.

## 2019-10-10 NOTE — HOSPICE
Met with the son/HCPOA, Jonatan Alexander, the spouse, Rosa Maria Piper, and the patient's daughter at the patients bedside. Jonatan Alexander stated that they had hired agency for their mother to provide 24/7 CG services in their home. Jonatan Alexander was requesting Durable Medical Equipment (DME)  to be delivered to their home prior to the patient being discharge and that she be admitted to [id number] [place] [address] for Routine Hospice Care ([address]). Reviewed the patient's EMR and treatments with Dr. Tamiko Vincent MD an [facility] physician who determined the patient meets criteria for RHC. [address] [facility]) and ordered a Hospital bed with a low air-loss mattress, an over bed table and an O2 concentrator with backup tank be delivered to the home tomorrow 10/11/19. Jonatan Catherine confirmed that [facility] had called him and the DME  will be delivered to the home tomorrow at 12p. Called Dr. Frederick Yu who confirmed the patient can go home once the DME  is in place. Meeting with Jonatan Alexander tomorrow morning at 9 am to sign all Hospice consents. Hospice Admission is scheduled for Saturday 10/12/19 at 10:30 am.  Please arrange for discharge and transportation home on Saturday 10/12/19 at 09:30. I will leave a DNR signed by the [facility] for the Attending Physician to sign in the patient's chart on the floor. Informed the bedside nurse.     Thank you for this referral,    Aneesh Marin RN, BSN  Community Nurse Liaison  [facility]  [phone]

## 2019-10-10 NOTE — PROGRESS NOTES
Problem: Falls - Risk of  Goal: *Absence of Falls  Description  Document Rey Lauro Fall Risk and appropriate interventions in the flowsheet.   Outcome: Progressing Towards Goal  Note:   Fall Risk Interventions:       Mentation Interventions: Door open when patient unattended, Evaluate medications/consider consulting pharmacy, Family/sitter at bedside    Medication Interventions: Evaluate medications/consider consulting pharmacy, Patient to call before getting OOB    Elimination Interventions: Call light in reach, Patient to call for help with toileting needs              Problem: Patient Education: Go to Patient Education Activity  Goal: Patient/Family Education  Outcome: Progressing Towards Goal

## 2019-10-10 NOTE — PROGRESS NOTES
SPEECH PATHOLOGY NOTE:    Attempted to see patient for dysphagia treatment this AM.  Patient sleeping upon arrival. Son at bedside. Unable to wake patient. Will check back at later time/date as appropriate.       Guillermina Cardoza MS, CCC-SLP

## 2019-10-10 NOTE — PROGRESS NOTES
LMSW met with pt's son, Oralia Knox at bedside today. Son has hired [facility] to provide pt's private care and they are on board to begin pt care at discharge and need to be updated once discharge day/time is determined. Idolina Babinski is to follow up with pt's son, Oralia Knox to finalize delivery of home DME and determine hospice admission date. Remain available to assist further as needed.

## 2019-10-10 NOTE — PROGRESS NOTES
Pt resting in bed. Pt with good po intake with breakfast but poor po intake with lunch. Pt now resting in bed with eyes closed. No s/sx of distress noted at this time.  Call light in reach, door open will monitor

## 2019-10-10 NOTE — PROGRESS NOTES
Bedside report given to oncoming nurse, opportunity for questions given. Pt resting quietly in bed no needs noted at present respirations are even and non labored.

## 2019-10-10 NOTE — PROGRESS NOTES
Pt resting in bed with son at bedside. Pt alert but doesn't when spoken to. Son report pt has been this way for this was for a few years now. Pt on RA at this time. Pt has SCDs in place. Pt has 2+edema noted to right hand and forearm. Pt has IV  With IVF infusing to right wrist. IVF stopped at this time. Will place new IV. Pt and son encouraged to call for assistance if needed call light in reach, door open will monitor.

## 2019-10-11 PROCEDURE — 65270000029 HC RM PRIVATE

## 2019-10-11 PROCEDURE — 74011250636 HC RX REV CODE- 250/636: Performed by: HOSPITALIST

## 2019-10-11 RX ADMIN — HEPARIN SODIUM 5000 UNITS: 5000 INJECTION INTRAVENOUS; SUBCUTANEOUS at 20:43

## 2019-10-11 RX ADMIN — HEPARIN SODIUM 5000 UNITS: 5000 INJECTION INTRAVENOUS; SUBCUTANEOUS at 11:42

## 2019-10-11 RX ADMIN — Medication 10 ML: at 20:44

## 2019-10-11 RX ADMIN — Medication 10 ML: at 11:46

## 2019-10-11 RX ADMIN — Medication 10 ML: at 04:36

## 2019-10-11 RX ADMIN — HEPARIN SODIUM 5000 UNITS: 5000 INJECTION INTRAVENOUS; SUBCUTANEOUS at 04:35

## 2019-10-11 NOTE — PROGRESS NOTES
Bedside report received from [address] [place] [address]. Assessment completed. Bed is in low and locked position with floor free of objects. Patient Alert but confused. Pt is resting quietly in bed. No needs noted at present. Respirations even and non labored. Family at bedside verbalized understanding to call for needs. Call light within reach.

## 2019-10-11 NOTE — PROGRESS NOTES
LMMUKUND received update from [address] Liaison that he has signed consents for home hospice care with son. Waqar Dent at bedside today and pt is to go home Sat am with transport requested for 930am.  Sergo Bailon placed on notice for transport. MD aware of plan.

## 2019-10-11 NOTE — PROGRESS NOTES
SPEECH PATHOLOGY NOTE:    Attempted to see patient for dysphagia treatment this AM. Patient alert, but accepting PO trials. Will check back at later time/date as appropriate.         Eri Martínez MS, CCC-SLP

## 2019-10-11 NOTE — PROGRESS NOTES
Referral from Dr. Rohit Winn  Patient was very peaceful  , son and his wife were at the bedside. Family was very receptive to  presence. Acknowledged their grief  Provided supportive presence to all  Reflected upon their kate as a family and how we are able to cope with eternity before us. Prayer offered     said patient is going home tomorrow.     Will make this family a priority for follow up      Rollo Hodgkins, staff Kendrick [address], [address] [facility]  /   [email or web address]

## 2019-10-11 NOTE — PROGRESS NOTES
Pt is resting well with no needs at this time. Pt respirations are even and unlabored. Vital signs are stable.

## 2019-10-11 NOTE — PROGRESS NOTES
Pt resting in bed with eyes closed. Pt opens eyes when spoken to. Pt doesn't speak with nurse. Pt has son at bedside. No distress noted at this time. Pt on RA. Pt and son encouraged to call for assistance if needed call light in reach, door open will monitor.

## 2019-10-11 NOTE — PROGRESS NOTES
Problem: Falls - Risk of  Goal: *Absence of Falls  Description  Document Sorin Puri Fall Risk and appropriate interventions in the flowsheet.   Outcome: Progressing Towards Goal  Note:   Fall Risk Interventions:  Mobility Interventions: Patient to call before getting OOB    Mentation Interventions: Door open when patient unattended, Family/sitter at bedside    Medication Interventions: Evaluate medications/consider consulting pharmacy    Elimination Interventions: Call light in reach, Patient to call for help with toileting needs

## 2019-10-11 NOTE — PROGRESS NOTES
Pt resting in bed with eyes closed and family at bedside. Call light in reach, door open will monitor.

## 2019-10-11 NOTE — PROGRESS NOTES
Bedside report received from [address] [facility], [facility]. Assessment completed. Bed is in low and locked position with floor free of objects. Patient Alert but confused. Pt is resting quietly in bed. No needs noted at present. Respirations even and non labored. Family at bedside verbalized understanding to call for needs. Call light within reach.

## 2019-10-11 NOTE — PROGRESS NOTES
Pt sitting up in bed being fed dinner. Pt on RA. Pt with poor po intake for today. Pt encouraged to call for assistance if needed call light in reach, door open family at bedside. Family at bedside.

## 2019-10-11 NOTE — HOSPICE
Met with the patient's son/HCPOA, Mallory Faith, at the patient's bedside this morning to sign Open [facility]) Admission Consents. All consent were signed. Copy of the DNR that was signed by Bryanna Pham was placed in the paper chart on the floor for the Attending Physician to sign and be use when transporting the patient home via ambulance. The Durable Medical Equipment (DME) is being delivered to the patient's home today. Discharge is planned for Saturday 10/12/19.  Please arrange for transport home with pickup from D at 9:30 am.    Thank you,    Roberto Colon RN, BSN  Community Nurse Liaison  [facility]  [phone]

## 2019-10-12 ENCOUNTER — HOME CARE VISIT (OUTPATIENT)
Dept: HOSPICE | Facility: HOSPICE | Age: 84
End: 2019-10-12
Payer: MEDICARE

## 2019-10-12 VITALS
BODY MASS INDEX: 15.32 KG/M2 | HEART RATE: 76 BPM | DIASTOLIC BLOOD PRESSURE: 50 MMHG | TEMPERATURE: 99.1 F | SYSTOLIC BLOOD PRESSURE: 110 MMHG | RESPIRATION RATE: 17 BRPM | WEIGHT: 76 LBS | HEIGHT: 59 IN

## 2019-10-12 VITALS
DIASTOLIC BLOOD PRESSURE: 54 MMHG | SYSTOLIC BLOOD PRESSURE: 115 MMHG | OXYGEN SATURATION: 95 % | HEART RATE: 74 BPM | BODY MASS INDEX: 21.61 KG/M2 | TEMPERATURE: 99.4 F | WEIGHT: 107.2 LBS | HEIGHT: 59 IN | RESPIRATION RATE: 18 BRPM

## 2019-10-12 PROCEDURE — 74011250636 HC RX REV CODE- 250/636: Performed by: HOSPITALIST

## 2019-10-12 PROCEDURE — 0651 HSPC ROUTINE HOME CARE

## 2019-10-12 PROCEDURE — 3336500001 HSPC ELECTION

## 2019-10-12 PROCEDURE — G0299 HHS/HOSPICE OF RN EA 15 MIN: HCPCS

## 2019-10-12 RX ADMIN — Medication 10 ML: at 06:00

## 2019-10-12 RX ADMIN — HEPARIN SODIUM 5000 UNITS: 5000 INJECTION INTRAVENOUS; SUBCUTANEOUS at 04:32

## 2019-10-12 NOTE — PROGRESS NOTES
Pt resting in bed. Pt awakens when spoken to. Pt alert but confused. Pt doesn't speak and answer any questions. Son at bedside. Pt and son encouraged to call for assistance if needed call light in reach, door open will monitor.

## 2019-10-12 NOTE — DISCHARGE SUMMARY
Hospitalist Discharge Summary     Patient ID:  Nickolas Reeves  [id number]  71 y.o.  4/18/1928  Admit date: 10/4/2019  9:38 PM  Discharge date and time: 10/12/2019  Attending: Natalia Ramirez DO  PCP:  Waylon David MD  Treatment Team: Attending Provider: Natalia Ramirez DO; Utilization Review: Kriss Collazo, ZAIRA; Care Manager: Baljit Steven RN; Primary Nurse: Karmen Padilla RN    Principal Diagnosis MATTHEW (acute kidney injury) [facility])   Principal Problem:    MATTHEW (acute kidney injury) ([facility] [address] (10/4/2019)    Active Problems:    Hypernatremia (10/4/2019)      Acute metabolic encephalopathy (05/8/7999)      Adult failure to thrive (10/6/2019)      Alzheimer's dementia without behavioral disturbance ([facility] [address] (10/8/2019)      Severe dehydration (10/8/2019)      Leukocytosis (10/8/2019)      Macrocytic anemia (10/8/2019)             Hospital Course:  Please refer to the admission H&P for details of presentation. In summary, the patient is 89F with pmhx of Alzheimers dementia, HTN, DLP, breast CA s/p left mastectomy, CKD presented for altered mental status. Family reported decreased oral intake. She was found to have severe hypernatremia and MATTHEW secondary to decreased fluid intake. CT head non acute. She was started on IVF but continued to have low intake, unable to maintain hydration on her own. Hospice was consulted with decision for home hospice. Family in agreeement with discharge plan to home hospice today. Significant Diagnostic Studies:   Status Exam Begun  Exam Ended    Final [99] 10/06/2019 04:52 10/06/2019 04:57   Study Result     EXAM: XR CHEST PORT     INDICATION: leukocytosis     COMPARISON: 10/4/2019     FINDINGS: A portable AP radiograph of the chest was obtained at 0453 hours. The  patient is on a cardiac monitor. The lungs are clear. The cardiac and  mediastinal contours and pulmonary vascularity are normal.  The bones and soft  tissues are grossly within normal limits.    IMPRESSION  IMPRESSION: No acute cardiopulmonary disease. Final [99] 10/05/2019 11:13 10/05/2019 11:28   Study Result     Renal ultrasound, 10/5/2019     History: Acute kidney insufficiency.     Comparison: None.     Technique: Grayscale and doppler images of the kidneys and bladder were obtained  using a 3-5 MHz linear transducer.     Findings: The right kidney measures 10.4 cm, and the left kidney measures  approximately 10.2 cm in greatest longitudinal length. Mild hydronephrosis is  suggested of the right kidney. No definite evidence of hydronephrosis is seen. The left kidney is poorly visualized with questionable mild hydronephrosis  suggested on some images. Renal parenchymal echogenicity is increased consistent  with chronic medical renal changes. An approximately 2 cm cyst is seen in the  upper pole cortex of the right kidney.      The urinary bladder is poorly distended and therefore not well evaluated  although shows no obvious abnormality. Mild wall thickening is seen likely due  to the poorly distended state.     IMPRESSION  IMPRESSION:     1. Mild bilateral hydronephrosis suggested. The etiology is not clearly  demonstrated by ultrasound     Final [99] 10/04/2019 23:26 10/04/2019 23:31   Study Result     CT HEAD WITHOUT CONTRAST      HISTORY:  Transient alteration of awareness. .     COMPARISON: None.     TECHNIQUE: Axial imaging was performed without intravenous contrast utilizing  5mm slice thickness. Sagittal and coronal reformats were performed. Radiation  dose reduction techniques were used for this study. Our CT scanner uses one or  all of the following:     Automated exposure control, adjustment of the MAS or KUB according to patient's  size and iterative reconstruction.     FINDINGS:         *BRAIN:      -  There are no early signs of territorial or lacunar infarction by CT.     -  Symmetric supratentorial atrophy.      -  For patient's age, the scattered areas of white matter hypodensities may  represent a chronic small vessel white matter ischemia. However this is  nonspecific.     *VISUALIZED PARANASAL SINUSES: Well aerated. *MASTOIDS:  Clear. *CALVARIUM AND SCALP: Unremarkable.        IMPRESSION  IMPRESSION:     No acute intracranial abnormalities.     Date of Dictation: 10/4/2019 11:34 PM        Final [99] 10/04/2019 22:20 10/04/2019 22:25   Study Result     EXAM: XR CHEST PORT     INDICATION: AMS     COMPARISON: None.     FINDINGS: A portable AP radiograph of the chest was obtained at 2216 hours. The  patient is on a cardiac monitor. The lungs are clear. Thoracic aortic aneurysm  versus ectasia. .  The bones and soft tissues are grossly within normal limits.      IMPRESSION  IMPRESSION: Thoracic aortic aneurysm versus ectasia. There are no prior studies. Labs: Results:       Chemistry Recent Labs     10/10/19  0356   *      K 3.7   *   CO2 25   BUN 26*   CREA 1.60*   CA 8.2*   AGAP 7      CBC w/Diff No results for input(s): WBC, RBC, HGB, HCT, PLT, GRANS, LYMPH, EOS, HGBEXT, HCTEXT, PLTEXT in the last 72 hours. Cardiac Enzymes No results for input(s): CPK, CKND1, ALVERTO in the last 72 hours. No lab exists for component: CKRMB, TROIP   Coagulation No results for input(s): PTP, INR, APTT, INREXT in the last 72 hours. Lipid Panel No results found for: CHOL, CHOLPOCT, CHOLX, CHLST, CHOLV, 803624, HDL, HDLP, LDL, LDLC, DLDLP, 488954, VLDLC, VLDL, TGLX, TRIGL, TRIGP, TGLPOCT, CHHD, CHHDX   BNP No results for input(s): BNPP in the last 72 hours. Liver Enzymes No results for input(s): TP, ALB, TBIL, AP, SGOT, GPT in the last 72 hours.     No lab exists for component: DBIL   Thyroid Studies No results found for: T4, T3U, TSH, TSHEXT         Discharge Exam:  Visit Vitals  /54 (BP 1 Location: Right arm, BP Patient Position: At rest)   Pulse 74   Temp 99.4 °F (37.4 °C)   Resp 18   Ht 4' 11\" (1.499 m)   Wt 48.6 kg (107 lb 3.2 oz)   SpO2 95%   BMI 21.65 kg/m²     General appearance: alert, non verbal. Not following commands  Lungs: clear to auscultation bilaterally  Heart: regular rate and rhythm, S1, S2 normal, no murmur, click, rub or gallop  Abdomen: soft, non-tender. Bowel sounds normal. No masses,  no organomegaly  Extremities: no cyanosis or edema  Neurologic: Grossly normal    Disposition: home hospice  Discharge Condition: stable  Patient Instructions: There are no discharge medications for this patient.       Activity: as tolerated  Diet: pureed      Follow-up  · Prn hospice MD  Time spent to discharge patient 35 minutes  Signed:  Keara Lo DO  10/12/2019  9:34 AM

## 2019-10-12 NOTE — DISCHARGE INSTRUCTIONS
NUTRITION       Continue Oral Nutrition Supplement (ONS) at discharge. Recommend Ensure Complete or Ensure Plus or a comparable/similar product Three times daily   Angela CHUY Bautista RD, LD      DISCHARGE SUMMARY from Nurse    PATIENT INSTRUCTIONS:    After general anesthesia or intravenous sedation, for 24 hours or while taking prescription Narcotics:  · Limit your activities  · Do not drive and operate hazardous machinery  · Do not make important personal or business decisions  · Do  not drink alcoholic beverages  · If you have not urinated within 8 hours after discharge, please contact your surgeon on call. Report the following to your surgeon:  · Excessive pain, swelling, redness or odor of or around the surgical area  · Temperature over 100.5  · Nausea and vomiting lasting longer than 4 hours or if unable to take medications  · Any signs of decreased circulation or nerve impairment to extremity: change in color, persistent  numbness, tingling, coldness or increase pain  · Any questions    What to do at Home:  Recommended activity: Activity as tolerated. If you experience any of the following symptoms temp > 101.5, unrelieved pain, nausea or vomiting, shortness of breath or fatigue not relieved with rest, please follow up with your hospice care team.    *  Please give a list of your current medications to your Primary Care Provider. *  Please update this list whenever your medications are discontinued, doses are      changed, or new medications (including over-the-counter products) are added. *  Please carry medication information at all times in case of emergency situations. These are general instructions for a healthy lifestyle:    No smoking/ No tobacco products/ Avoid exposure to second hand smoke  Surgeon General's Warning:  Quitting smoking now greatly reduces serious risk to your health.     Obesity, smoking, and sedentary lifestyle greatly increases your risk for illness    A healthy diet, regular physical exercise & weight monitoring are important for maintaining a healthy lifestyle    You may be retaining fluid if you have a history of heart failure or if you experience any of the following symptoms:  Weight gain of 3 pounds or more overnight or 5 pounds in a week, increased swelling in our hands or feet or shortness of breath while lying flat in bed. Please call your doctor as soon as you notice any of these symptoms; do not wait until your next office visit. The discharge information has been reviewed with the caregiver. The caregiver verbalized understanding. Discharge medications reviewed with the caregiver and appropriate educational materials and side effects teaching were provided. Patient Education        Hospice: Care Instructions  Your Care Instructions  Hospice care provides medical treatment to relieve symptoms at the end of life. The goal is to keep you comfortable, not to try to cure you. Hospice care does not speed up or lengthen dying. It focuses on easing pain and other symptoms. Hospice caregivers want to enhance your quality of life. Hospice care also offers emotional help and spiritual support when you are dying. It also helps family members care for a loved one who is dying. Hospice care can help you review your life, say important things to family and friends, and explore spiritual issues. Hospice also helps your family and friends deal with their grief after you die. You can use hospice care if your illness cannot be cured and doctors believe you have no more than 6 months to live. You do not need to be confined to a bed or in a hospital to benefit from this type of care. The hospice team includes nurses, counselors, therapists, social workers, pastors, home health aides, and trained volunteers. You can get care in your own home or in a hospice center. Some hospice workers also go to nursing homes or hospitals.   How can you care for yourself at home?  · Prepare a list of advance directives. These are instructions to your doctor and family members about what kind of care you want if you become unable to speak or express yourself. · Find out if your health insurance covers hospice care. · Find hospice programs in your area. People who can help include your doctor, your state health department, and your insurance company. · Decide what kinds of hospice services you want. It helps to know what you want before you enter a hospice program.  · Think about some questions when preparing for hospice care. ? Who do you want to make decisions about your medical care if you are not able to? Many people choose their spouse, child, or doctor. ? What are you most afraid of that might happen? You might be afraid of having pain or losing your independence. Let your hospice team know your fears. The team can help you deal with them. ? Where would you prefer to die? Choices include your home, a hospital, or a nursing home. ? Do you want to donate organs when you die? Make sure that your family clearly understands this. ? Do you want any Amish rites or practices to be done before you die? Let your hospice team know what you want. Where can you learn more? Go to http://bozena-leanne.info/. Enter X995 in the search box to learn more about \"Hospice: Care Instructions. \"  Current as of: April 1, 2019  Content Version: 12.2  © 1195-0116 [employer]. Care instructions adapted under license by [employer] (which disclaims liability or warranty for this information). If you have questions about a medical condition or this instruction, always ask your healthcare professional. [address] any warranty or liability for your use of this information.          ___________________________________________________________________________________________________________________________________

## 2019-10-12 NOTE — PROGRESS NOTES
Pt is for discharge home this am with [facility]. Transport via [place] with DNR. Son at bedside. Care Management Interventions  Mode of Transport at Discharge:  Other (see comment)(Family)  Transition of Care Consult (CM Consult): Tono: Yes  Discharge Durable Medical Equipment: No(Per son (Brent Sebastian) patient currently has DME's in the home such as wheelchair, power chair, BSC, shower chair,grab bars)  Occupational Therapy Consult: Yes  Speech Therapy Consult: Yes  Current Support Network: Own Home, Lives with Spouse(Daughter lives with patient)  Confirm Follow Up Transport: Family  Plan discussed with Pt/Family/Caregiver: Yes  Freedom of Choice Offered: Yes  [address] Provided?: No  Discharge Location  Discharge Placement: Home with hospice([facility])

## 2019-10-12 NOTE — PROGRESS NOTES
Patient resting quietly in bed respirations are even and unlabored with no sign of distress noted at this time. Family at bedside.

## 2019-10-12 NOTE — PROGRESS NOTES
Discharge instructions, follow up information, and medication list provided and explained to the patient's son at bedside. IV removed from right [facility], patient discharging with reyes - discharging home with [address] [place]. Opportunity for questions provided. Transport is here to transport patient home.

## 2019-10-13 ENCOUNTER — HOME CARE VISIT (OUTPATIENT)
Dept: SCHEDULING | Facility: HOME HEALTH | Age: 84
End: 2019-10-13
Payer: MEDICARE

## 2019-10-13 PROCEDURE — 0651 HSPC ROUTINE HOME CARE

## 2019-10-13 PROCEDURE — HOSPICE MEDICATION HC HH HOSPICE MEDICATION

## 2019-10-14 ENCOUNTER — HOME CARE VISIT (OUTPATIENT)
Dept: SCHEDULING | Facility: HOME HEALTH | Age: 84
End: 2019-10-14
Payer: MEDICARE

## 2019-10-14 PROCEDURE — 0651 HSPC ROUTINE HOME CARE

## 2019-10-14 PROCEDURE — T4521 ADULT SIZE BRIEF/DIAPER SM: HCPCS

## 2019-10-14 PROCEDURE — A9270 NON-COVERED ITEM OR SERVICE: HCPCS

## 2019-10-14 PROCEDURE — T4541 LARGE DISPOSABLE UNDERPAD: HCPCS

## 2019-10-14 PROCEDURE — G0156 HHCP-SVS OF AIDE,EA 15 MIN: HCPCS

## 2019-10-15 ENCOUNTER — HOME CARE VISIT (OUTPATIENT)
Dept: HOSPICE | Facility: HOSPICE | Age: 84
End: 2019-10-15
Payer: MEDICARE

## 2019-10-15 ENCOUNTER — HOME CARE VISIT (OUTPATIENT)
Dept: SCHEDULING | Facility: HOME HEALTH | Age: 84
End: 2019-10-15
Payer: MEDICARE

## 2019-10-15 PROCEDURE — G0155 HHCP-SVS OF CSW,EA 15 MIN: HCPCS

## 2019-10-15 PROCEDURE — G0299 HHS/HOSPICE OF RN EA 15 MIN: HCPCS

## 2019-10-15 PROCEDURE — 0651 HSPC ROUTINE HOME CARE

## 2019-10-16 ENCOUNTER — HOME CARE VISIT (OUTPATIENT)
Dept: SCHEDULING | Facility: HOME HEALTH | Age: 84
End: 2019-10-16
Payer: MEDICARE

## 2019-10-16 PROCEDURE — A6212 FOAM DRG <=16 SQ IN W/BORDER: HCPCS

## 2019-10-16 PROCEDURE — T4541 LARGE DISPOSABLE UNDERPAD: HCPCS

## 2019-10-16 PROCEDURE — A6250 SKIN SEAL PROTECT MOISTURIZR: HCPCS

## 2019-10-16 PROCEDURE — T4522 ADULT SIZE BRIEF/DIAPER MED: HCPCS

## 2019-10-16 PROCEDURE — G0156 HHCP-SVS OF AIDE,EA 15 MIN: HCPCS

## 2019-10-16 PROCEDURE — 0651 HSPC ROUTINE HOME CARE

## 2019-10-17 VITALS
DIASTOLIC BLOOD PRESSURE: 70 MMHG | TEMPERATURE: 99 F | RESPIRATION RATE: 16 BRPM | OXYGEN SATURATION: 97 % | SYSTOLIC BLOOD PRESSURE: 123 MMHG | HEART RATE: 69 BPM

## 2019-10-17 PROCEDURE — 0651 HSPC ROUTINE HOME CARE

## 2019-10-18 ENCOUNTER — HOME CARE VISIT (OUTPATIENT)
Dept: SCHEDULING | Facility: HOME HEALTH | Age: 84
End: 2019-10-18
Payer: MEDICARE

## 2019-10-18 PROCEDURE — 0651 HSPC ROUTINE HOME CARE

## 2019-10-18 PROCEDURE — G0156 HHCP-SVS OF AIDE,EA 15 MIN: HCPCS

## 2019-10-19 ENCOUNTER — HOME CARE VISIT (OUTPATIENT)
Dept: HOSPICE | Facility: HOSPICE | Age: 84
End: 2019-10-19
Payer: MEDICARE

## 2019-10-19 PROCEDURE — 0651 HSPC ROUTINE HOME CARE

## 2019-10-20 ENCOUNTER — HOME CARE VISIT (OUTPATIENT)
Dept: HOSPICE | Facility: HOSPICE | Age: 84
End: 2019-10-20
Payer: MEDICARE

## 2019-10-20 PROCEDURE — 0651 HSPC ROUTINE HOME CARE

## 2019-10-21 ENCOUNTER — HOME CARE VISIT (OUTPATIENT)
Dept: SCHEDULING | Facility: HOME HEALTH | Age: 84
End: 2019-10-21
Payer: MEDICARE

## 2019-10-21 PROCEDURE — G0156 HHCP-SVS OF AIDE,EA 15 MIN: HCPCS

## 2019-10-21 PROCEDURE — 0651 HSPC ROUTINE HOME CARE

## 2019-10-22 ENCOUNTER — HOME CARE VISIT (OUTPATIENT)
Dept: SCHEDULING | Facility: HOME HEALTH | Age: 84
End: 2019-10-22
Payer: MEDICARE

## 2019-10-22 PROCEDURE — G0299 HHS/HOSPICE OF RN EA 15 MIN: HCPCS

## 2019-10-22 PROCEDURE — 0651 HSPC ROUTINE HOME CARE

## 2019-10-22 PROCEDURE — A6250 SKIN SEAL PROTECT MOISTURIZR: HCPCS

## 2019-10-22 PROCEDURE — T4522 ADULT SIZE BRIEF/DIAPER MED: HCPCS

## 2019-10-22 PROCEDURE — T4541 LARGE DISPOSABLE UNDERPAD: HCPCS

## 2019-10-23 ENCOUNTER — HOME CARE VISIT (OUTPATIENT)
Dept: SCHEDULING | Facility: HOME HEALTH | Age: 84
End: 2019-10-23
Payer: MEDICARE

## 2019-10-23 VITALS
DIASTOLIC BLOOD PRESSURE: 79 MMHG | RESPIRATION RATE: 20 BRPM | SYSTOLIC BLOOD PRESSURE: 150 MMHG | TEMPERATURE: 97.6 F | OXYGEN SATURATION: 98 % | HEART RATE: 70 BPM

## 2019-10-23 PROCEDURE — G0156 HHCP-SVS OF AIDE,EA 15 MIN: HCPCS

## 2019-10-23 PROCEDURE — 0651 HSPC ROUTINE HOME CARE

## 2019-10-24 PROCEDURE — 0651 HSPC ROUTINE HOME CARE

## 2019-10-25 ENCOUNTER — HOME CARE VISIT (OUTPATIENT)
Dept: SCHEDULING | Facility: HOME HEALTH | Age: 84
End: 2019-10-25
Payer: MEDICARE

## 2019-10-25 PROCEDURE — 0651 HSPC ROUTINE HOME CARE

## 2019-10-25 PROCEDURE — G0156 HHCP-SVS OF AIDE,EA 15 MIN: HCPCS

## 2019-10-26 PROCEDURE — 0651 HSPC ROUTINE HOME CARE

## 2019-10-27 PROCEDURE — 0651 HSPC ROUTINE HOME CARE

## 2019-10-28 ENCOUNTER — HOME CARE VISIT (OUTPATIENT)
Dept: SCHEDULING | Facility: HOME HEALTH | Age: 84
End: 2019-10-28
Payer: MEDICARE

## 2019-10-28 PROCEDURE — 0651 HSPC ROUTINE HOME CARE

## 2019-10-28 PROCEDURE — G0156 HHCP-SVS OF AIDE,EA 15 MIN: HCPCS

## 2019-10-29 ENCOUNTER — HOME CARE VISIT (OUTPATIENT)
Dept: SCHEDULING | Facility: HOME HEALTH | Age: 84
End: 2019-10-29
Payer: MEDICARE

## 2019-10-29 PROCEDURE — 0651 HSPC ROUTINE HOME CARE

## 2019-10-29 PROCEDURE — G0299 HHS/HOSPICE OF RN EA 15 MIN: HCPCS

## 2019-10-30 ENCOUNTER — HOME CARE VISIT (OUTPATIENT)
Dept: HOSPICE | Facility: HOSPICE | Age: 84
End: 2019-10-30
Payer: MEDICARE

## 2019-10-30 ENCOUNTER — HOME CARE VISIT (OUTPATIENT)
Dept: SCHEDULING | Facility: HOME HEALTH | Age: 84
End: 2019-10-30
Payer: MEDICARE

## 2019-10-30 VITALS
HEART RATE: 61 BPM | RESPIRATION RATE: 16 BRPM | SYSTOLIC BLOOD PRESSURE: 111 MMHG | DIASTOLIC BLOOD PRESSURE: 66 MMHG | OXYGEN SATURATION: 92 % | TEMPERATURE: 97.9 F

## 2019-10-30 PROCEDURE — HOSPICE MEDICATION HC HH HOSPICE MEDICATION

## 2019-10-30 PROCEDURE — 0651 HSPC ROUTINE HOME CARE

## 2019-10-30 PROCEDURE — G0156 HHCP-SVS OF AIDE,EA 15 MIN: HCPCS

## 2019-10-31 PROCEDURE — 0651 HSPC ROUTINE HOME CARE

## 2019-11-01 ENCOUNTER — HOME CARE VISIT (OUTPATIENT)
Dept: SCHEDULING | Facility: HOME HEALTH | Age: 84
End: 2019-11-01
Payer: MEDICARE

## 2019-11-01 VITALS
OXYGEN SATURATION: 94 % | RESPIRATION RATE: 16 BRPM | SYSTOLIC BLOOD PRESSURE: 154 MMHG | TEMPERATURE: 97.7 F | HEART RATE: 70 BPM | DIASTOLIC BLOOD PRESSURE: 81 MMHG

## 2019-11-01 PROCEDURE — G0299 HHS/HOSPICE OF RN EA 15 MIN: HCPCS

## 2019-11-01 PROCEDURE — 0651 HSPC ROUTINE HOME CARE

## 2019-11-01 PROCEDURE — G0156 HHCP-SVS OF AIDE,EA 15 MIN: HCPCS

## 2019-11-02 PROCEDURE — 0651 HSPC ROUTINE HOME CARE

## 2019-11-03 PROCEDURE — 0651 HSPC ROUTINE HOME CARE

## 2019-11-04 ENCOUNTER — HOME CARE VISIT (OUTPATIENT)
Dept: SCHEDULING | Facility: HOME HEALTH | Age: 84
End: 2019-11-04
Payer: MEDICARE

## 2019-11-04 PROCEDURE — G0156 HHCP-SVS OF AIDE,EA 15 MIN: HCPCS

## 2019-11-04 PROCEDURE — 0651 HSPC ROUTINE HOME CARE

## 2019-11-05 ENCOUNTER — HOME CARE VISIT (OUTPATIENT)
Dept: SCHEDULING | Facility: HOME HEALTH | Age: 84
End: 2019-11-05
Payer: MEDICARE

## 2019-11-05 VITALS
TEMPERATURE: 98.4 F | SYSTOLIC BLOOD PRESSURE: 124 MMHG | OXYGEN SATURATION: 97 % | DIASTOLIC BLOOD PRESSURE: 72 MMHG | HEART RATE: 60 BPM | RESPIRATION RATE: 16 BRPM

## 2019-11-05 PROCEDURE — 0651 HSPC ROUTINE HOME CARE

## 2019-11-05 PROCEDURE — G0299 HHS/HOSPICE OF RN EA 15 MIN: HCPCS

## 2019-11-06 ENCOUNTER — HOME CARE VISIT (OUTPATIENT)
Dept: SCHEDULING | Facility: HOME HEALTH | Age: 84
End: 2019-11-06
Payer: MEDICARE

## 2019-11-06 ENCOUNTER — HOME CARE VISIT (OUTPATIENT)
Dept: HOSPICE | Facility: HOSPICE | Age: 84
End: 2019-11-06
Payer: MEDICARE

## 2019-11-06 PROCEDURE — G0156 HHCP-SVS OF AIDE,EA 15 MIN: HCPCS

## 2019-11-06 PROCEDURE — G0299 HHS/HOSPICE OF RN EA 15 MIN: HCPCS

## 2019-11-06 PROCEDURE — 0651 HSPC ROUTINE HOME CARE

## 2019-11-07 PROCEDURE — 0651 HSPC ROUTINE HOME CARE

## 2019-11-08 ENCOUNTER — HOME CARE VISIT (OUTPATIENT)
Dept: SCHEDULING | Facility: HOME HEALTH | Age: 84
End: 2019-11-08
Payer: MEDICARE

## 2019-11-08 PROCEDURE — 0651 HSPC ROUTINE HOME CARE

## 2019-11-08 PROCEDURE — G0156 HHCP-SVS OF AIDE,EA 15 MIN: HCPCS

## 2019-11-09 ENCOUNTER — HOME CARE VISIT (OUTPATIENT)
Dept: SCHEDULING | Facility: HOME HEALTH | Age: 84
End: 2019-11-09
Payer: MEDICARE

## 2019-11-09 PROCEDURE — 0651 HSPC ROUTINE HOME CARE

## 2019-11-10 ENCOUNTER — HOME CARE VISIT (OUTPATIENT)
Dept: SCHEDULING | Facility: HOME HEALTH | Age: 84
End: 2019-11-10
Payer: MEDICARE

## 2019-11-10 PROCEDURE — 0651 HSPC ROUTINE HOME CARE

## 2019-11-10 PROCEDURE — G0299 HHS/HOSPICE OF RN EA 15 MIN: HCPCS

## 2019-11-11 ENCOUNTER — HOME CARE VISIT (OUTPATIENT)
Dept: SCHEDULING | Facility: HOME HEALTH | Age: 84
End: 2019-11-11
Payer: MEDICARE

## 2019-11-11 VITALS — RESPIRATION RATE: 18 BRPM

## 2019-11-11 PROCEDURE — G0156 HHCP-SVS OF AIDE,EA 15 MIN: HCPCS

## 2019-11-11 PROCEDURE — 0651 HSPC ROUTINE HOME CARE

## 2019-11-11 PROCEDURE — T4521 ADULT SIZE BRIEF/DIAPER SM: HCPCS

## 2019-11-11 PROCEDURE — A4338 INDWELLING CATHETER LATEX: HCPCS

## 2019-11-11 PROCEDURE — T4541 LARGE DISPOSABLE UNDERPAD: HCPCS

## 2019-11-11 PROCEDURE — A4310 INSERT TRAY W/O BAG/CATH: HCPCS

## 2019-11-11 PROCEDURE — G0299 HHS/HOSPICE OF RN EA 15 MIN: HCPCS

## 2019-11-12 VITALS
SYSTOLIC BLOOD PRESSURE: 112 MMHG | TEMPERATURE: 98.2 F | OXYGEN SATURATION: 90 % | HEART RATE: 80 BPM | DIASTOLIC BLOOD PRESSURE: 64 MMHG | RESPIRATION RATE: 18 BRPM

## 2019-11-12 PROCEDURE — 0651 HSPC ROUTINE HOME CARE

## 2019-11-13 ENCOUNTER — HOME CARE VISIT (OUTPATIENT)
Dept: SCHEDULING | Facility: HOME HEALTH | Age: 84
End: 2019-11-13
Payer: MEDICARE

## 2019-11-13 ENCOUNTER — HOME CARE VISIT (OUTPATIENT)
Dept: HOSPICE | Facility: HOSPICE | Age: 84
End: 2019-11-13
Payer: MEDICARE

## 2019-11-13 PROCEDURE — G0299 HHS/HOSPICE OF RN EA 15 MIN: HCPCS

## 2019-11-13 PROCEDURE — G0156 HHCP-SVS OF AIDE,EA 15 MIN: HCPCS

## 2019-11-13 PROCEDURE — 0651 HSPC ROUTINE HOME CARE

## 2019-11-13 PROCEDURE — T4522 ADULT SIZE BRIEF/DIAPER MED: HCPCS

## 2019-11-14 PROCEDURE — 0651 HSPC ROUTINE HOME CARE

## 2019-11-15 ENCOUNTER — HOME CARE VISIT (OUTPATIENT)
Dept: SCHEDULING | Facility: HOME HEALTH | Age: 84
End: 2019-11-15
Payer: MEDICARE

## 2019-11-15 PROCEDURE — 0651 HSPC ROUTINE HOME CARE

## 2019-11-15 PROCEDURE — G0156 HHCP-SVS OF AIDE,EA 15 MIN: HCPCS

## 2019-11-16 PROCEDURE — 0651 HSPC ROUTINE HOME CARE

## 2019-11-17 PROCEDURE — 0651 HSPC ROUTINE HOME CARE

## 2019-11-18 ENCOUNTER — HOME CARE VISIT (OUTPATIENT)
Dept: SCHEDULING | Facility: HOME HEALTH | Age: 84
End: 2019-11-18
Payer: MEDICARE

## 2019-11-18 PROCEDURE — G0156 HHCP-SVS OF AIDE,EA 15 MIN: HCPCS

## 2019-11-18 PROCEDURE — G0299 HHS/HOSPICE OF RN EA 15 MIN: HCPCS

## 2019-11-18 PROCEDURE — 0651 HSPC ROUTINE HOME CARE

## 2019-11-19 ENCOUNTER — HOME CARE VISIT (OUTPATIENT)
Dept: SCHEDULING | Facility: HOME HEALTH | Age: 84
End: 2019-11-19
Payer: MEDICARE

## 2019-11-19 VITALS
DIASTOLIC BLOOD PRESSURE: 74 MMHG | TEMPERATURE: 98.5 F | SYSTOLIC BLOOD PRESSURE: 131 MMHG | RESPIRATION RATE: 16 BRPM | HEART RATE: 72 BPM

## 2019-11-19 PROCEDURE — 0651 HSPC ROUTINE HOME CARE

## 2019-11-19 PROCEDURE — G0155 HHCP-SVS OF CSW,EA 15 MIN: HCPCS

## 2019-11-20 ENCOUNTER — HOME CARE VISIT (OUTPATIENT)
Dept: SCHEDULING | Facility: HOME HEALTH | Age: 84
End: 2019-11-20
Payer: MEDICARE

## 2019-11-20 PROCEDURE — G0156 HHCP-SVS OF AIDE,EA 15 MIN: HCPCS

## 2019-11-20 PROCEDURE — 0651 HSPC ROUTINE HOME CARE

## 2019-11-20 PROCEDURE — G0299 HHS/HOSPICE OF RN EA 15 MIN: HCPCS

## 2019-11-21 ENCOUNTER — HOME CARE VISIT (OUTPATIENT)
Dept: SCHEDULING | Facility: HOME HEALTH | Age: 84
End: 2019-11-21
Payer: MEDICARE

## 2019-11-21 VITALS — TEMPERATURE: 98.1 F

## 2019-11-21 PROCEDURE — 0651 HSPC ROUTINE HOME CARE

## 2019-11-22 ENCOUNTER — HOME CARE VISIT (OUTPATIENT)
Dept: SCHEDULING | Facility: HOME HEALTH | Age: 84
End: 2019-11-22
Payer: MEDICARE

## 2019-11-22 PROCEDURE — G0156 HHCP-SVS OF AIDE,EA 15 MIN: HCPCS

## 2019-11-22 PROCEDURE — 0651 HSPC ROUTINE HOME CARE

## 2019-11-23 PROCEDURE — 0651 HSPC ROUTINE HOME CARE

## 2019-11-24 PROCEDURE — 0651 HSPC ROUTINE HOME CARE

## 2019-11-25 ENCOUNTER — HOME CARE VISIT (OUTPATIENT)
Dept: SCHEDULING | Facility: HOME HEALTH | Age: 84
End: 2019-11-25
Payer: MEDICARE

## 2019-11-25 VITALS
HEART RATE: 57 BPM | OXYGEN SATURATION: 96 % | RESPIRATION RATE: 18 BRPM | DIASTOLIC BLOOD PRESSURE: 81 MMHG | TEMPERATURE: 98.8 F | SYSTOLIC BLOOD PRESSURE: 120 MMHG

## 2019-11-25 PROCEDURE — G0299 HHS/HOSPICE OF RN EA 15 MIN: HCPCS

## 2019-11-25 PROCEDURE — G0156 HHCP-SVS OF AIDE,EA 15 MIN: HCPCS

## 2019-11-25 PROCEDURE — 0651 HSPC ROUTINE HOME CARE

## 2019-11-26 PROCEDURE — 0651 HSPC ROUTINE HOME CARE

## 2019-11-27 ENCOUNTER — HOME CARE VISIT (OUTPATIENT)
Dept: SCHEDULING | Facility: HOME HEALTH | Age: 84
End: 2019-11-27
Payer: MEDICARE

## 2019-11-27 PROCEDURE — G0156 HHCP-SVS OF AIDE,EA 15 MIN: HCPCS

## 2019-11-27 PROCEDURE — 0651 HSPC ROUTINE HOME CARE

## 2019-11-28 PROCEDURE — 0651 HSPC ROUTINE HOME CARE

## 2019-11-29 PROCEDURE — 0651 HSPC ROUTINE HOME CARE

## 2019-11-30 PROCEDURE — 0651 HSPC ROUTINE HOME CARE

## 2019-12-01 PROCEDURE — 0651 HSPC ROUTINE HOME CARE

## 2019-12-02 ENCOUNTER — HOME CARE VISIT (OUTPATIENT)
Dept: SCHEDULING | Facility: HOME HEALTH | Age: 84
End: 2019-12-02
Payer: MEDICARE

## 2019-12-02 PROCEDURE — 0651 HSPC ROUTINE HOME CARE

## 2019-12-02 PROCEDURE — G0156 HHCP-SVS OF AIDE,EA 15 MIN: HCPCS

## 2019-12-03 ENCOUNTER — HOME CARE VISIT (OUTPATIENT)
Dept: SCHEDULING | Facility: HOME HEALTH | Age: 84
End: 2019-12-03
Payer: MEDICARE

## 2019-12-03 VITALS
DIASTOLIC BLOOD PRESSURE: 73 MMHG | OXYGEN SATURATION: 99 % | HEART RATE: 74 BPM | SYSTOLIC BLOOD PRESSURE: 155 MMHG | TEMPERATURE: 98.4 F | RESPIRATION RATE: 18 BRPM

## 2019-12-03 PROCEDURE — G0299 HHS/HOSPICE OF RN EA 15 MIN: HCPCS

## 2019-12-03 PROCEDURE — 0651 HSPC ROUTINE HOME CARE

## 2019-12-04 ENCOUNTER — HOME CARE VISIT (OUTPATIENT)
Dept: SCHEDULING | Facility: HOME HEALTH | Age: 84
End: 2019-12-04
Payer: MEDICARE

## 2019-12-04 PROCEDURE — G0156 HHCP-SVS OF AIDE,EA 15 MIN: HCPCS

## 2019-12-04 PROCEDURE — T4541 LARGE DISPOSABLE UNDERPAD: HCPCS

## 2019-12-04 PROCEDURE — 0651 HSPC ROUTINE HOME CARE

## 2019-12-04 PROCEDURE — T4522 ADULT SIZE BRIEF/DIAPER MED: HCPCS

## 2019-12-05 PROCEDURE — 0651 HSPC ROUTINE HOME CARE

## 2019-12-06 ENCOUNTER — HOME CARE VISIT (OUTPATIENT)
Dept: HOSPICE | Facility: HOSPICE | Age: 84
End: 2019-12-06
Payer: MEDICARE

## 2019-12-06 ENCOUNTER — HOME CARE VISIT (OUTPATIENT)
Dept: SCHEDULING | Facility: HOME HEALTH | Age: 84
End: 2019-12-06
Payer: MEDICARE

## 2019-12-06 PROCEDURE — G0156 HHCP-SVS OF AIDE,EA 15 MIN: HCPCS

## 2019-12-06 PROCEDURE — 0651 HSPC ROUTINE HOME CARE

## 2019-12-07 PROCEDURE — 0651 HSPC ROUTINE HOME CARE

## 2019-12-08 ENCOUNTER — HOME CARE VISIT (OUTPATIENT)
Dept: SCHEDULING | Facility: HOME HEALTH | Age: 84
End: 2019-12-08
Payer: MEDICARE

## 2019-12-08 VITALS — DIASTOLIC BLOOD PRESSURE: 88 MMHG | HEART RATE: 64 BPM | SYSTOLIC BLOOD PRESSURE: 138 MMHG | RESPIRATION RATE: 16 BRPM

## 2019-12-08 PROCEDURE — G0299 HHS/HOSPICE OF RN EA 15 MIN: HCPCS

## 2019-12-08 PROCEDURE — 0651 HSPC ROUTINE HOME CARE

## 2019-12-09 ENCOUNTER — HOME CARE VISIT (OUTPATIENT)
Dept: SCHEDULING | Facility: HOME HEALTH | Age: 84
End: 2019-12-09
Payer: MEDICARE

## 2019-12-09 PROCEDURE — G0156 HHCP-SVS OF AIDE,EA 15 MIN: HCPCS

## 2019-12-09 PROCEDURE — 0651 HSPC ROUTINE HOME CARE

## 2019-12-10 ENCOUNTER — HOME CARE VISIT (OUTPATIENT)
Dept: SCHEDULING | Facility: HOME HEALTH | Age: 84
End: 2019-12-10
Payer: MEDICARE

## 2019-12-10 PROCEDURE — G0299 HHS/HOSPICE OF RN EA 15 MIN: HCPCS

## 2019-12-10 PROCEDURE — 0651 HSPC ROUTINE HOME CARE

## 2019-12-11 ENCOUNTER — HOME CARE VISIT (OUTPATIENT)
Dept: SCHEDULING | Facility: HOME HEALTH | Age: 84
End: 2019-12-11
Payer: MEDICARE

## 2019-12-11 VITALS
SYSTOLIC BLOOD PRESSURE: 132 MMHG | TEMPERATURE: 98.4 F | RESPIRATION RATE: 16 BRPM | DIASTOLIC BLOOD PRESSURE: 66 MMHG | HEART RATE: 71 BPM | OXYGEN SATURATION: 95 %

## 2019-12-11 PROCEDURE — 0651 HSPC ROUTINE HOME CARE

## 2019-12-11 PROCEDURE — A6250 SKIN SEAL PROTECT MOISTURIZR: HCPCS

## 2019-12-11 PROCEDURE — G0156 HHCP-SVS OF AIDE,EA 15 MIN: HCPCS

## 2019-12-11 PROCEDURE — T4522 ADULT SIZE BRIEF/DIAPER MED: HCPCS

## 2019-12-12 PROCEDURE — 0651 HSPC ROUTINE HOME CARE

## 2019-12-13 ENCOUNTER — HOME CARE VISIT (OUTPATIENT)
Dept: SCHEDULING | Facility: HOME HEALTH | Age: 84
End: 2019-12-13
Payer: MEDICARE

## 2019-12-13 PROCEDURE — 0651 HSPC ROUTINE HOME CARE

## 2019-12-13 PROCEDURE — G0156 HHCP-SVS OF AIDE,EA 15 MIN: HCPCS

## 2019-12-14 PROCEDURE — 0651 HSPC ROUTINE HOME CARE

## 2019-12-15 PROCEDURE — 0651 HSPC ROUTINE HOME CARE

## 2019-12-16 ENCOUNTER — HOME CARE VISIT (OUTPATIENT)
Dept: SCHEDULING | Facility: HOME HEALTH | Age: 84
End: 2019-12-16
Payer: MEDICARE

## 2019-12-16 PROCEDURE — T4541 LARGE DISPOSABLE UNDERPAD: HCPCS

## 2019-12-16 PROCEDURE — 0651 HSPC ROUTINE HOME CARE

## 2019-12-16 PROCEDURE — T4522 ADULT SIZE BRIEF/DIAPER MED: HCPCS

## 2019-12-16 PROCEDURE — G0299 HHS/HOSPICE OF RN EA 15 MIN: HCPCS

## 2019-12-16 PROCEDURE — G0156 HHCP-SVS OF AIDE,EA 15 MIN: HCPCS

## 2019-12-17 VITALS
SYSTOLIC BLOOD PRESSURE: 139 MMHG | TEMPERATURE: 98.8 F | DIASTOLIC BLOOD PRESSURE: 88 MMHG | HEART RATE: 64 BPM | RESPIRATION RATE: 18 BRPM | OXYGEN SATURATION: 94 %

## 2019-12-17 PROCEDURE — 0651 HSPC ROUTINE HOME CARE

## 2019-12-18 ENCOUNTER — HOME CARE VISIT (OUTPATIENT)
Dept: SCHEDULING | Facility: HOME HEALTH | Age: 84
End: 2019-12-18
Payer: MEDICARE

## 2019-12-18 PROCEDURE — G0156 HHCP-SVS OF AIDE,EA 15 MIN: HCPCS

## 2019-12-18 PROCEDURE — 0651 HSPC ROUTINE HOME CARE

## 2019-12-19 PROCEDURE — 0651 HSPC ROUTINE HOME CARE

## 2019-12-20 ENCOUNTER — HOME CARE VISIT (OUTPATIENT)
Dept: SCHEDULING | Facility: HOME HEALTH | Age: 84
End: 2019-12-20
Payer: MEDICARE

## 2019-12-20 PROCEDURE — 0651 HSPC ROUTINE HOME CARE

## 2019-12-20 PROCEDURE — G0156 HHCP-SVS OF AIDE,EA 15 MIN: HCPCS

## 2019-12-21 PROCEDURE — 0651 HSPC ROUTINE HOME CARE

## 2019-12-22 PROCEDURE — 0651 HSPC ROUTINE HOME CARE

## 2019-12-23 ENCOUNTER — HOME CARE VISIT (OUTPATIENT)
Dept: SCHEDULING | Facility: HOME HEALTH | Age: 84
End: 2019-12-23
Payer: MEDICARE

## 2019-12-23 PROCEDURE — G0156 HHCP-SVS OF AIDE,EA 15 MIN: HCPCS

## 2019-12-23 PROCEDURE — 0651 HSPC ROUTINE HOME CARE

## 2019-12-24 ENCOUNTER — HOME CARE VISIT (OUTPATIENT)
Dept: SCHEDULING | Facility: HOME HEALTH | Age: 84
End: 2019-12-24
Payer: MEDICARE

## 2019-12-24 PROCEDURE — G0299 HHS/HOSPICE OF RN EA 15 MIN: HCPCS

## 2019-12-24 PROCEDURE — 0651 HSPC ROUTINE HOME CARE

## 2019-12-25 ENCOUNTER — HOME CARE VISIT (OUTPATIENT)
Dept: SCHEDULING | Facility: HOME HEALTH | Age: 84
End: 2019-12-25
Payer: MEDICARE

## 2019-12-25 VITALS
SYSTOLIC BLOOD PRESSURE: 143 MMHG | TEMPERATURE: 97.3 F | DIASTOLIC BLOOD PRESSURE: 84 MMHG | HEART RATE: 57 BPM | OXYGEN SATURATION: 96 % | RESPIRATION RATE: 18 BRPM

## 2019-12-25 PROCEDURE — 0651 HSPC ROUTINE HOME CARE

## 2019-12-26 PROCEDURE — 0651 HSPC ROUTINE HOME CARE

## 2019-12-27 ENCOUNTER — HOME CARE VISIT (OUTPATIENT)
Dept: SCHEDULING | Facility: HOME HEALTH | Age: 84
End: 2019-12-27
Payer: MEDICARE

## 2019-12-27 PROCEDURE — 0651 HSPC ROUTINE HOME CARE

## 2019-12-27 PROCEDURE — G0155 HHCP-SVS OF CSW,EA 15 MIN: HCPCS

## 2019-12-27 PROCEDURE — G0156 HHCP-SVS OF AIDE,EA 15 MIN: HCPCS

## 2019-12-28 PROCEDURE — 0651 HSPC ROUTINE HOME CARE

## 2019-12-29 PROCEDURE — 0651 HSPC ROUTINE HOME CARE

## 2019-12-30 ENCOUNTER — HOME CARE VISIT (OUTPATIENT)
Dept: SCHEDULING | Facility: HOME HEALTH | Age: 84
End: 2019-12-30
Payer: MEDICARE

## 2019-12-30 PROCEDURE — 0651 HSPC ROUTINE HOME CARE

## 2019-12-30 PROCEDURE — G0156 HHCP-SVS OF AIDE,EA 15 MIN: HCPCS

## 2019-12-30 PROCEDURE — G0299 HHS/HOSPICE OF RN EA 15 MIN: HCPCS

## 2019-12-31 VITALS
DIASTOLIC BLOOD PRESSURE: 81 MMHG | TEMPERATURE: 98.7 F | SYSTOLIC BLOOD PRESSURE: 113 MMHG | OXYGEN SATURATION: 93 % | RESPIRATION RATE: 18 BRPM | HEART RATE: 71 BPM

## 2019-12-31 PROCEDURE — T4522 ADULT SIZE BRIEF/DIAPER MED: HCPCS

## 2019-12-31 PROCEDURE — A6250 SKIN SEAL PROTECT MOISTURIZR: HCPCS

## 2019-12-31 PROCEDURE — 0651 HSPC ROUTINE HOME CARE

## 2020-01-01 PROCEDURE — 0651 HSPC ROUTINE HOME CARE

## 2020-01-02 PROCEDURE — 0651 HSPC ROUTINE HOME CARE

## 2020-01-03 ENCOUNTER — HOME CARE VISIT (OUTPATIENT)
Dept: SCHEDULING | Facility: HOME HEALTH | Age: 85
End: 2020-01-03
Payer: MEDICARE

## 2020-01-03 PROCEDURE — 0651 HSPC ROUTINE HOME CARE

## 2020-01-03 PROCEDURE — G0156 HHCP-SVS OF AIDE,EA 15 MIN: HCPCS

## 2020-01-04 ENCOUNTER — HOME CARE VISIT (OUTPATIENT)
Dept: HOSPICE | Facility: HOSPICE | Age: 85
End: 2020-01-04
Payer: MEDICARE

## 2020-01-04 VITALS
HEART RATE: 76 BPM | TEMPERATURE: 99.2 F | DIASTOLIC BLOOD PRESSURE: 60 MMHG | RESPIRATION RATE: 20 BRPM | SYSTOLIC BLOOD PRESSURE: 158 MMHG

## 2020-01-04 PROCEDURE — G0299 HHS/HOSPICE OF RN EA 15 MIN: HCPCS

## 2020-01-04 PROCEDURE — 0651 HSPC ROUTINE HOME CARE

## 2020-01-05 PROCEDURE — 0651 HSPC ROUTINE HOME CARE

## 2020-01-06 ENCOUNTER — HOME CARE VISIT (OUTPATIENT)
Dept: SCHEDULING | Facility: HOME HEALTH | Age: 85
End: 2020-01-06
Payer: MEDICARE

## 2020-01-06 PROCEDURE — 0651 HSPC ROUTINE HOME CARE

## 2020-01-06 PROCEDURE — G0156 HHCP-SVS OF AIDE,EA 15 MIN: HCPCS

## 2020-01-06 PROCEDURE — G0299 HHS/HOSPICE OF RN EA 15 MIN: HCPCS

## 2020-01-07 VITALS
OXYGEN SATURATION: 93 % | TEMPERATURE: 99.8 F | DIASTOLIC BLOOD PRESSURE: 69 MMHG | SYSTOLIC BLOOD PRESSURE: 119 MMHG | RESPIRATION RATE: 16 BRPM | HEART RATE: 80 BPM

## 2020-01-07 PROCEDURE — 0651 HSPC ROUTINE HOME CARE

## 2020-01-08 ENCOUNTER — HOME CARE VISIT (OUTPATIENT)
Dept: SCHEDULING | Facility: HOME HEALTH | Age: 85
End: 2020-01-08
Payer: MEDICARE

## 2020-01-08 PROCEDURE — 0651 HSPC ROUTINE HOME CARE

## 2020-01-08 PROCEDURE — G0156 HHCP-SVS OF AIDE,EA 15 MIN: HCPCS

## 2020-01-09 PROCEDURE — 0651 HSPC ROUTINE HOME CARE

## 2020-01-10 ENCOUNTER — HOME CARE VISIT (OUTPATIENT)
Dept: SCHEDULING | Facility: HOME HEALTH | Age: 85
End: 2020-01-10
Payer: MEDICARE

## 2020-01-10 PROCEDURE — G0156 HHCP-SVS OF AIDE,EA 15 MIN: HCPCS

## 2020-01-10 PROCEDURE — 0651 HSPC ROUTINE HOME CARE

## 2020-01-11 PROCEDURE — 0651 HSPC ROUTINE HOME CARE

## 2020-01-12 PROCEDURE — 0651 HSPC ROUTINE HOME CARE

## 2020-01-13 ENCOUNTER — HOME CARE VISIT (OUTPATIENT)
Dept: SCHEDULING | Facility: HOME HEALTH | Age: 85
End: 2020-01-13
Payer: MEDICARE

## 2020-01-13 VITALS
DIASTOLIC BLOOD PRESSURE: 92 MMHG | TEMPERATURE: 96.5 F | HEART RATE: 77 BPM | RESPIRATION RATE: 18 BRPM | SYSTOLIC BLOOD PRESSURE: 163 MMHG | OXYGEN SATURATION: 94 %

## 2020-01-13 PROCEDURE — G0299 HHS/HOSPICE OF RN EA 15 MIN: HCPCS

## 2020-01-13 PROCEDURE — 0651 HSPC ROUTINE HOME CARE

## 2020-01-13 PROCEDURE — A6250 SKIN SEAL PROTECT MOISTURIZR: HCPCS

## 2020-01-13 PROCEDURE — T4522 ADULT SIZE BRIEF/DIAPER MED: HCPCS

## 2020-01-13 PROCEDURE — G0156 HHCP-SVS OF AIDE,EA 15 MIN: HCPCS

## 2020-01-14 PROCEDURE — 0651 HSPC ROUTINE HOME CARE

## 2020-01-15 ENCOUNTER — HOME CARE VISIT (OUTPATIENT)
Dept: SCHEDULING | Facility: HOME HEALTH | Age: 85
End: 2020-01-15
Payer: MEDICARE

## 2020-01-15 PROCEDURE — 0651 HSPC ROUTINE HOME CARE

## 2020-01-15 PROCEDURE — G0156 HHCP-SVS OF AIDE,EA 15 MIN: HCPCS

## 2020-01-16 PROCEDURE — 0651 HSPC ROUTINE HOME CARE

## 2020-01-17 ENCOUNTER — HOME CARE VISIT (OUTPATIENT)
Dept: SCHEDULING | Facility: HOME HEALTH | Age: 85
End: 2020-01-17
Payer: MEDICARE

## 2020-01-17 PROCEDURE — 0651 HSPC ROUTINE HOME CARE

## 2020-01-17 PROCEDURE — G0156 HHCP-SVS OF AIDE,EA 15 MIN: HCPCS

## 2020-01-18 PROCEDURE — 0651 HSPC ROUTINE HOME CARE

## 2020-01-19 PROCEDURE — 0651 HSPC ROUTINE HOME CARE

## 2020-01-20 ENCOUNTER — HOME CARE VISIT (OUTPATIENT)
Dept: SCHEDULING | Facility: HOME HEALTH | Age: 85
End: 2020-01-20
Payer: MEDICARE

## 2020-01-20 VITALS
TEMPERATURE: 98.8 F | HEART RATE: 69 BPM | SYSTOLIC BLOOD PRESSURE: 126 MMHG | DIASTOLIC BLOOD PRESSURE: 74 MMHG | OXYGEN SATURATION: 97 % | RESPIRATION RATE: 18 BRPM

## 2020-01-20 PROCEDURE — G0299 HHS/HOSPICE OF RN EA 15 MIN: HCPCS

## 2020-01-20 PROCEDURE — 0651 HSPC ROUTINE HOME CARE

## 2020-01-20 PROCEDURE — G0156 HHCP-SVS OF AIDE,EA 15 MIN: HCPCS

## 2020-01-21 PROCEDURE — 0651 HSPC ROUTINE HOME CARE

## 2020-01-21 PROCEDURE — T4522 ADULT SIZE BRIEF/DIAPER MED: HCPCS

## 2020-01-22 ENCOUNTER — HOME CARE VISIT (OUTPATIENT)
Dept: SCHEDULING | Facility: HOME HEALTH | Age: 85
End: 2020-01-22
Payer: MEDICARE

## 2020-01-22 PROCEDURE — 0651 HSPC ROUTINE HOME CARE

## 2020-01-22 PROCEDURE — G0156 HHCP-SVS OF AIDE,EA 15 MIN: HCPCS

## 2020-01-23 PROCEDURE — 0651 HSPC ROUTINE HOME CARE

## 2020-01-24 ENCOUNTER — HOME CARE VISIT (OUTPATIENT)
Dept: SCHEDULING | Facility: HOME HEALTH | Age: 85
End: 2020-01-24
Payer: MEDICARE

## 2020-01-24 PROCEDURE — 0651 HSPC ROUTINE HOME CARE

## 2020-01-24 PROCEDURE — G0156 HHCP-SVS OF AIDE,EA 15 MIN: HCPCS

## 2020-01-25 PROCEDURE — 0651 HSPC ROUTINE HOME CARE

## 2020-01-26 ENCOUNTER — HOME CARE VISIT (OUTPATIENT)
Dept: SCHEDULING | Facility: HOME HEALTH | Age: 85
End: 2020-01-26
Payer: MEDICARE

## 2020-01-26 PROCEDURE — 0651 HSPC ROUTINE HOME CARE

## 2020-01-26 PROCEDURE — G0155 HHCP-SVS OF CSW,EA 15 MIN: HCPCS

## 2020-01-27 ENCOUNTER — HOME CARE VISIT (OUTPATIENT)
Dept: SCHEDULING | Facility: HOME HEALTH | Age: 85
End: 2020-01-27
Payer: MEDICARE

## 2020-01-27 PROCEDURE — G0299 HHS/HOSPICE OF RN EA 15 MIN: HCPCS

## 2020-01-27 PROCEDURE — G0156 HHCP-SVS OF AIDE,EA 15 MIN: HCPCS

## 2020-01-27 PROCEDURE — 0651 HSPC ROUTINE HOME CARE

## 2020-01-28 VITALS
SYSTOLIC BLOOD PRESSURE: 141 MMHG | DIASTOLIC BLOOD PRESSURE: 85 MMHG | OXYGEN SATURATION: 98 % | RESPIRATION RATE: 20 BRPM | TEMPERATURE: 98.6 F | HEART RATE: 67 BPM

## 2020-01-28 PROCEDURE — 0651 HSPC ROUTINE HOME CARE

## 2020-01-29 ENCOUNTER — HOME CARE VISIT (OUTPATIENT)
Dept: SCHEDULING | Facility: HOME HEALTH | Age: 85
End: 2020-01-29
Payer: MEDICARE

## 2020-01-29 PROCEDURE — G0156 HHCP-SVS OF AIDE,EA 15 MIN: HCPCS

## 2020-01-29 PROCEDURE — 0651 HSPC ROUTINE HOME CARE

## 2020-01-30 PROCEDURE — 0651 HSPC ROUTINE HOME CARE

## 2020-01-31 ENCOUNTER — HOME CARE VISIT (OUTPATIENT)
Dept: SCHEDULING | Facility: HOME HEALTH | Age: 85
End: 2020-01-31
Payer: MEDICARE

## 2020-01-31 PROCEDURE — 0651 HSPC ROUTINE HOME CARE

## 2020-01-31 PROCEDURE — G0156 HHCP-SVS OF AIDE,EA 15 MIN: HCPCS

## 2020-02-01 PROCEDURE — 0651 HSPC ROUTINE HOME CARE

## 2020-02-02 PROCEDURE — 0651 HSPC ROUTINE HOME CARE

## 2020-02-03 ENCOUNTER — HOME CARE VISIT (OUTPATIENT)
Dept: SCHEDULING | Facility: HOME HEALTH | Age: 85
End: 2020-02-03
Payer: MEDICARE

## 2020-02-03 PROCEDURE — G0299 HHS/HOSPICE OF RN EA 15 MIN: HCPCS

## 2020-02-03 PROCEDURE — G0156 HHCP-SVS OF AIDE,EA 15 MIN: HCPCS

## 2020-02-03 PROCEDURE — 0651 HSPC ROUTINE HOME CARE

## 2020-02-04 VITALS
OXYGEN SATURATION: 93 % | TEMPERATURE: 97.2 F | HEART RATE: 68 BPM | SYSTOLIC BLOOD PRESSURE: 124 MMHG | RESPIRATION RATE: 16 BRPM | DIASTOLIC BLOOD PRESSURE: 74 MMHG

## 2020-02-04 PROCEDURE — 0651 HSPC ROUTINE HOME CARE

## 2020-02-05 ENCOUNTER — HOME CARE VISIT (OUTPATIENT)
Dept: SCHEDULING | Facility: HOME HEALTH | Age: 85
End: 2020-02-05
Payer: MEDICARE

## 2020-02-05 PROCEDURE — 0651 HSPC ROUTINE HOME CARE

## 2020-02-05 PROCEDURE — G0156 HHCP-SVS OF AIDE,EA 15 MIN: HCPCS

## 2020-02-06 ENCOUNTER — HOME CARE VISIT (OUTPATIENT)
Dept: SCHEDULING | Facility: HOME HEALTH | Age: 85
End: 2020-02-06
Payer: MEDICARE

## 2020-02-06 PROCEDURE — 0651 HSPC ROUTINE HOME CARE

## 2020-02-06 PROCEDURE — G0155 HHCP-SVS OF CSW,EA 15 MIN: HCPCS

## 2020-02-07 ENCOUNTER — HOME CARE VISIT (OUTPATIENT)
Dept: SCHEDULING | Facility: HOME HEALTH | Age: 85
End: 2020-02-07
Payer: MEDICARE

## 2020-02-07 PROCEDURE — 0651 HSPC ROUTINE HOME CARE

## 2020-02-07 PROCEDURE — G0156 HHCP-SVS OF AIDE,EA 15 MIN: HCPCS

## 2020-02-08 PROCEDURE — 0651 HSPC ROUTINE HOME CARE

## 2020-02-09 PROCEDURE — 0651 HSPC ROUTINE HOME CARE

## 2020-02-10 ENCOUNTER — HOME CARE VISIT (OUTPATIENT)
Dept: SCHEDULING | Facility: HOME HEALTH | Age: 85
End: 2020-02-10
Payer: MEDICARE

## 2020-02-10 PROCEDURE — 0651 HSPC ROUTINE HOME CARE

## 2020-02-10 PROCEDURE — G0299 HHS/HOSPICE OF RN EA 15 MIN: HCPCS

## 2020-02-11 ENCOUNTER — HOME CARE VISIT (OUTPATIENT)
Dept: SCHEDULING | Facility: HOME HEALTH | Age: 85
End: 2020-02-11
Payer: MEDICARE

## 2020-02-11 VITALS
OXYGEN SATURATION: 92 % | RESPIRATION RATE: 18 BRPM | HEART RATE: 71 BPM | TEMPERATURE: 97.3 F | DIASTOLIC BLOOD PRESSURE: 69 MMHG | SYSTOLIC BLOOD PRESSURE: 125 MMHG

## 2020-02-11 PROCEDURE — G0156 HHCP-SVS OF AIDE,EA 15 MIN: HCPCS

## 2020-02-11 PROCEDURE — 0651 HSPC ROUTINE HOME CARE

## 2020-02-12 ENCOUNTER — HOME CARE VISIT (OUTPATIENT)
Dept: SCHEDULING | Facility: HOME HEALTH | Age: 85
End: 2020-02-12
Payer: MEDICARE

## 2020-02-12 PROCEDURE — G0156 HHCP-SVS OF AIDE,EA 15 MIN: HCPCS

## 2020-02-12 PROCEDURE — 0651 HSPC ROUTINE HOME CARE

## 2020-02-13 PROCEDURE — 0651 HSPC ROUTINE HOME CARE

## 2020-02-14 ENCOUNTER — HOME CARE VISIT (OUTPATIENT)
Dept: SCHEDULING | Facility: HOME HEALTH | Age: 85
End: 2020-02-14
Payer: MEDICARE

## 2020-02-14 PROCEDURE — T4522 ADULT SIZE BRIEF/DIAPER MED: HCPCS

## 2020-02-14 PROCEDURE — 0651 HSPC ROUTINE HOME CARE

## 2020-02-14 PROCEDURE — G0156 HHCP-SVS OF AIDE,EA 15 MIN: HCPCS

## 2020-02-15 PROCEDURE — 0651 HSPC ROUTINE HOME CARE

## 2020-02-16 PROCEDURE — 0651 HSPC ROUTINE HOME CARE

## 2020-02-17 ENCOUNTER — HOME CARE VISIT (OUTPATIENT)
Dept: SCHEDULING | Facility: HOME HEALTH | Age: 85
End: 2020-02-17
Payer: MEDICARE

## 2020-02-17 PROCEDURE — G0299 HHS/HOSPICE OF RN EA 15 MIN: HCPCS

## 2020-02-17 PROCEDURE — 0651 HSPC ROUTINE HOME CARE

## 2020-02-17 PROCEDURE — G0156 HHCP-SVS OF AIDE,EA 15 MIN: HCPCS

## 2020-02-18 VITALS
SYSTOLIC BLOOD PRESSURE: 148 MMHG | DIASTOLIC BLOOD PRESSURE: 71 MMHG | OXYGEN SATURATION: 91 % | RESPIRATION RATE: 18 BRPM | TEMPERATURE: 98.5 F | HEART RATE: 55 BPM

## 2020-02-18 PROCEDURE — 0651 HSPC ROUTINE HOME CARE

## 2020-02-19 ENCOUNTER — HOME CARE VISIT (OUTPATIENT)
Dept: SCHEDULING | Facility: HOME HEALTH | Age: 85
End: 2020-02-19
Payer: MEDICARE

## 2020-02-19 PROCEDURE — 0651 HSPC ROUTINE HOME CARE

## 2020-02-19 PROCEDURE — G0156 HHCP-SVS OF AIDE,EA 15 MIN: HCPCS

## 2020-02-20 PROCEDURE — 0651 HSPC ROUTINE HOME CARE

## 2020-02-21 ENCOUNTER — HOME CARE VISIT (OUTPATIENT)
Dept: SCHEDULING | Facility: HOME HEALTH | Age: 85
End: 2020-02-21
Payer: MEDICARE

## 2020-02-21 PROCEDURE — G0156 HHCP-SVS OF AIDE,EA 15 MIN: HCPCS

## 2020-02-21 PROCEDURE — 0651 HSPC ROUTINE HOME CARE

## 2020-02-22 PROCEDURE — 0651 HSPC ROUTINE HOME CARE

## 2020-02-23 PROCEDURE — 0651 HSPC ROUTINE HOME CARE

## 2020-02-24 ENCOUNTER — HOME CARE VISIT (OUTPATIENT)
Dept: SCHEDULING | Facility: HOME HEALTH | Age: 85
End: 2020-02-24
Payer: MEDICARE

## 2020-02-24 PROCEDURE — G0156 HHCP-SVS OF AIDE,EA 15 MIN: HCPCS

## 2020-02-24 PROCEDURE — 0651 HSPC ROUTINE HOME CARE

## 2020-02-25 PROCEDURE — 0651 HSPC ROUTINE HOME CARE

## 2020-02-26 ENCOUNTER — HOME CARE VISIT (OUTPATIENT)
Dept: SCHEDULING | Facility: HOME HEALTH | Age: 85
End: 2020-02-26
Payer: MEDICARE

## 2020-02-26 PROCEDURE — G0299 HHS/HOSPICE OF RN EA 15 MIN: HCPCS

## 2020-02-26 PROCEDURE — 0651 HSPC ROUTINE HOME CARE

## 2020-02-26 PROCEDURE — G0156 HHCP-SVS OF AIDE,EA 15 MIN: HCPCS

## 2020-02-27 VITALS
DIASTOLIC BLOOD PRESSURE: 70 MMHG | HEART RATE: 72 BPM | TEMPERATURE: 97.6 F | RESPIRATION RATE: 20 BRPM | OXYGEN SATURATION: 98 % | SYSTOLIC BLOOD PRESSURE: 142 MMHG

## 2020-02-27 PROCEDURE — T4522 ADULT SIZE BRIEF/DIAPER MED: HCPCS

## 2020-02-27 PROCEDURE — 0651 HSPC ROUTINE HOME CARE

## 2020-02-28 ENCOUNTER — HOME CARE VISIT (OUTPATIENT)
Dept: SCHEDULING | Facility: HOME HEALTH | Age: 85
End: 2020-02-28
Payer: MEDICARE

## 2020-02-28 PROCEDURE — 0651 HSPC ROUTINE HOME CARE

## 2020-02-28 PROCEDURE — G0156 HHCP-SVS OF AIDE,EA 15 MIN: HCPCS

## 2020-02-29 PROCEDURE — 0651 HSPC ROUTINE HOME CARE

## 2020-03-01 PROCEDURE — 0651 HSPC ROUTINE HOME CARE

## 2020-03-02 ENCOUNTER — HOME CARE VISIT (OUTPATIENT)
Dept: SCHEDULING | Facility: HOME HEALTH | Age: 85
End: 2020-03-02
Payer: MEDICARE

## 2020-03-02 PROCEDURE — G0299 HHS/HOSPICE OF RN EA 15 MIN: HCPCS

## 2020-03-02 PROCEDURE — G0156 HHCP-SVS OF AIDE,EA 15 MIN: HCPCS

## 2020-03-02 PROCEDURE — 0651 HSPC ROUTINE HOME CARE

## 2020-03-03 VITALS
RESPIRATION RATE: 18 BRPM | HEART RATE: 73 BPM | DIASTOLIC BLOOD PRESSURE: 73 MMHG | SYSTOLIC BLOOD PRESSURE: 120 MMHG | TEMPERATURE: 98.3 F | OXYGEN SATURATION: 95 %

## 2020-03-03 PROCEDURE — 0651 HSPC ROUTINE HOME CARE

## 2020-03-04 ENCOUNTER — HOME CARE VISIT (OUTPATIENT)
Dept: SCHEDULING | Facility: HOME HEALTH | Age: 85
End: 2020-03-04
Payer: MEDICARE

## 2020-03-04 PROCEDURE — G0156 HHCP-SVS OF AIDE,EA 15 MIN: HCPCS

## 2020-03-04 PROCEDURE — 0651 HSPC ROUTINE HOME CARE

## 2020-03-05 PROCEDURE — 0651 HSPC ROUTINE HOME CARE

## 2020-03-06 ENCOUNTER — HOME CARE VISIT (OUTPATIENT)
Dept: SCHEDULING | Facility: HOME HEALTH | Age: 85
End: 2020-03-06
Payer: MEDICARE

## 2020-03-06 PROCEDURE — 0651 HSPC ROUTINE HOME CARE

## 2020-03-06 PROCEDURE — G0156 HHCP-SVS OF AIDE,EA 15 MIN: HCPCS

## 2020-03-07 PROCEDURE — 0651 HSPC ROUTINE HOME CARE

## 2020-03-08 PROCEDURE — 0651 HSPC ROUTINE HOME CARE

## 2020-03-09 ENCOUNTER — HOME CARE VISIT (OUTPATIENT)
Dept: SCHEDULING | Facility: HOME HEALTH | Age: 85
End: 2020-03-09
Payer: MEDICARE

## 2020-03-09 VITALS
DIASTOLIC BLOOD PRESSURE: 63 MMHG | RESPIRATION RATE: 16 BRPM | OXYGEN SATURATION: 98 % | HEART RATE: 76 BPM | SYSTOLIC BLOOD PRESSURE: 130 MMHG | TEMPERATURE: 97.6 F

## 2020-03-09 PROCEDURE — G0156 HHCP-SVS OF AIDE,EA 15 MIN: HCPCS

## 2020-03-09 PROCEDURE — A6250 SKIN SEAL PROTECT MOISTURIZR: HCPCS

## 2020-03-09 PROCEDURE — T4541 LARGE DISPOSABLE UNDERPAD: HCPCS

## 2020-03-09 PROCEDURE — G0299 HHS/HOSPICE OF RN EA 15 MIN: HCPCS

## 2020-03-09 PROCEDURE — 0651 HSPC ROUTINE HOME CARE

## 2020-03-09 PROCEDURE — T4522 ADULT SIZE BRIEF/DIAPER MED: HCPCS

## 2020-03-10 PROCEDURE — 0651 HSPC ROUTINE HOME CARE

## 2020-03-11 ENCOUNTER — HOME CARE VISIT (OUTPATIENT)
Dept: SCHEDULING | Facility: HOME HEALTH | Age: 85
End: 2020-03-11
Payer: MEDICARE

## 2020-03-11 PROCEDURE — 0651 HSPC ROUTINE HOME CARE

## 2020-03-11 PROCEDURE — G0156 HHCP-SVS OF AIDE,EA 15 MIN: HCPCS

## 2020-03-12 ENCOUNTER — HOME CARE VISIT (OUTPATIENT)
Dept: SCHEDULING | Facility: HOME HEALTH | Age: 85
End: 2020-03-12
Payer: MEDICARE

## 2020-03-12 PROCEDURE — G0299 HHS/HOSPICE OF RN EA 15 MIN: HCPCS

## 2020-03-12 PROCEDURE — 0651 HSPC ROUTINE HOME CARE

## 2020-03-13 ENCOUNTER — HOME CARE VISIT (OUTPATIENT)
Dept: SCHEDULING | Facility: HOME HEALTH | Age: 85
End: 2020-03-13
Payer: MEDICARE

## 2020-03-13 PROCEDURE — 0651 HSPC ROUTINE HOME CARE

## 2020-03-13 PROCEDURE — G0156 HHCP-SVS OF AIDE,EA 15 MIN: HCPCS

## 2020-03-14 PROCEDURE — 0651 HSPC ROUTINE HOME CARE

## 2020-03-15 PROCEDURE — 0651 HSPC ROUTINE HOME CARE

## 2020-03-16 ENCOUNTER — HOME CARE VISIT (OUTPATIENT)
Dept: SCHEDULING | Facility: HOME HEALTH | Age: 85
End: 2020-03-16
Payer: MEDICARE

## 2020-03-16 PROCEDURE — G0156 HHCP-SVS OF AIDE,EA 15 MIN: HCPCS

## 2020-03-16 PROCEDURE — 0651 HSPC ROUTINE HOME CARE

## 2020-03-17 ENCOUNTER — HOME CARE VISIT (OUTPATIENT)
Dept: SCHEDULING | Facility: HOME HEALTH | Age: 85
End: 2020-03-17
Payer: MEDICARE

## 2020-03-17 ENCOUNTER — HOME CARE VISIT (OUTPATIENT)
Dept: HOSPICE | Facility: HOSPICE | Age: 85
End: 2020-03-17
Payer: MEDICARE

## 2020-03-17 PROCEDURE — G0155 HHCP-SVS OF CSW,EA 15 MIN: HCPCS

## 2020-03-17 PROCEDURE — G0299 HHS/HOSPICE OF RN EA 15 MIN: HCPCS

## 2020-03-17 PROCEDURE — 0651 HSPC ROUTINE HOME CARE

## 2020-03-18 ENCOUNTER — HOME CARE VISIT (OUTPATIENT)
Dept: SCHEDULING | Facility: HOME HEALTH | Age: 85
End: 2020-03-18
Payer: MEDICARE

## 2020-03-18 VITALS
SYSTOLIC BLOOD PRESSURE: 139 MMHG | OXYGEN SATURATION: 88 % | DIASTOLIC BLOOD PRESSURE: 65 MMHG | RESPIRATION RATE: 18 BRPM | TEMPERATURE: 98.4 F | HEART RATE: 79 BPM

## 2020-03-18 PROCEDURE — 0651 HSPC ROUTINE HOME CARE

## 2020-03-18 PROCEDURE — G0156 HHCP-SVS OF AIDE,EA 15 MIN: HCPCS

## 2020-03-19 PROCEDURE — T4522 ADULT SIZE BRIEF/DIAPER MED: HCPCS

## 2020-03-19 PROCEDURE — 0651 HSPC ROUTINE HOME CARE

## 2020-03-20 ENCOUNTER — HOME CARE VISIT (OUTPATIENT)
Dept: SCHEDULING | Facility: HOME HEALTH | Age: 85
End: 2020-03-20
Payer: MEDICARE

## 2020-03-20 PROCEDURE — 0651 HSPC ROUTINE HOME CARE

## 2020-03-20 PROCEDURE — G0156 HHCP-SVS OF AIDE,EA 15 MIN: HCPCS

## 2020-03-21 PROCEDURE — 0651 HSPC ROUTINE HOME CARE

## 2020-03-22 ENCOUNTER — HOME CARE VISIT (OUTPATIENT)
Dept: SCHEDULING | Facility: HOME HEALTH | Age: 85
End: 2020-03-22
Payer: MEDICARE

## 2020-03-22 PROCEDURE — 0651 HSPC ROUTINE HOME CARE

## 2020-03-23 ENCOUNTER — HOME CARE VISIT (OUTPATIENT)
Dept: SCHEDULING | Facility: HOME HEALTH | Age: 85
End: 2020-03-23
Payer: MEDICARE

## 2020-03-23 VITALS
HEART RATE: 61 BPM | SYSTOLIC BLOOD PRESSURE: 139 MMHG | RESPIRATION RATE: 18 BRPM | DIASTOLIC BLOOD PRESSURE: 82 MMHG | TEMPERATURE: 98.3 F

## 2020-03-23 PROCEDURE — G0299 HHS/HOSPICE OF RN EA 15 MIN: HCPCS

## 2020-03-23 PROCEDURE — G0156 HHCP-SVS OF AIDE,EA 15 MIN: HCPCS

## 2020-03-23 PROCEDURE — 0651 HSPC ROUTINE HOME CARE

## 2020-03-24 PROCEDURE — 0651 HSPC ROUTINE HOME CARE

## 2020-03-24 PROCEDURE — T4522 ADULT SIZE BRIEF/DIAPER MED: HCPCS

## 2020-03-24 PROCEDURE — T4541 LARGE DISPOSABLE UNDERPAD: HCPCS

## 2020-03-25 ENCOUNTER — HOME CARE VISIT (OUTPATIENT)
Dept: SCHEDULING | Facility: HOME HEALTH | Age: 85
End: 2020-03-25
Payer: MEDICARE

## 2020-03-25 PROBLEM — Z51.5 HOSPICE CARE PATIENT: Status: ACTIVE | Noted: 2020-03-25

## 2020-03-25 PROCEDURE — 0651 HSPC ROUTINE HOME CARE

## 2020-03-25 PROCEDURE — G0299 HHS/HOSPICE OF RN EA 15 MIN: HCPCS

## 2020-03-25 PROCEDURE — G0156 HHCP-SVS OF AIDE,EA 15 MIN: HCPCS

## 2020-03-26 PROCEDURE — 0651 HSPC ROUTINE HOME CARE

## 2020-03-27 ENCOUNTER — HOME CARE VISIT (OUTPATIENT)
Dept: SCHEDULING | Facility: HOME HEALTH | Age: 85
End: 2020-03-27
Payer: MEDICARE

## 2020-03-27 PROCEDURE — 0651 HSPC ROUTINE HOME CARE

## 2020-03-27 NOTE — PROGRESS NOTES
Daughter Graciela Barakat canceled today's visit due to family possible can in contact with hired care giver who has family member being tested for the COVID 23. I called  Argenis Rivero also e-mail  Juan Pablo Carrillo.

## 2020-03-28 PROCEDURE — 0651 HSPC ROUTINE HOME CARE

## 2020-03-29 PROCEDURE — 0651 HSPC ROUTINE HOME CARE

## 2020-03-30 ENCOUNTER — HOME CARE VISIT (OUTPATIENT)
Dept: SCHEDULING | Facility: HOME HEALTH | Age: 85
End: 2020-03-30
Payer: MEDICARE

## 2020-03-30 PROCEDURE — 0651 HSPC ROUTINE HOME CARE

## 2020-03-31 PROCEDURE — 0651 HSPC ROUTINE HOME CARE

## 2020-04-01 ENCOUNTER — HOME CARE VISIT (OUTPATIENT)
Dept: SCHEDULING | Facility: HOME HEALTH | Age: 85
End: 2020-04-01
Payer: MEDICARE

## 2020-04-01 PROCEDURE — G0156 HHCP-SVS OF AIDE,EA 15 MIN: HCPCS

## 2020-04-01 PROCEDURE — 0651 HSPC ROUTINE HOME CARE

## 2020-04-02 ENCOUNTER — HOME CARE VISIT (OUTPATIENT)
Dept: SCHEDULING | Facility: HOME HEALTH | Age: 85
End: 2020-04-02
Payer: MEDICARE

## 2020-04-02 VITALS
SYSTOLIC BLOOD PRESSURE: 157 MMHG | HEART RATE: 98 BPM | DIASTOLIC BLOOD PRESSURE: 80 MMHG | RESPIRATION RATE: 16 BRPM | TEMPERATURE: 98.6 F

## 2020-04-02 PROCEDURE — G0299 HHS/HOSPICE OF RN EA 15 MIN: HCPCS

## 2020-04-02 PROCEDURE — 0651 HSPC ROUTINE HOME CARE

## 2020-04-03 ENCOUNTER — HOME CARE VISIT (OUTPATIENT)
Dept: SCHEDULING | Facility: HOME HEALTH | Age: 85
End: 2020-04-03
Payer: MEDICARE

## 2020-04-03 PROCEDURE — G0156 HHCP-SVS OF AIDE,EA 15 MIN: HCPCS

## 2020-04-03 PROCEDURE — 0651 HSPC ROUTINE HOME CARE

## 2020-04-04 PROCEDURE — 0651 HSPC ROUTINE HOME CARE

## 2020-04-05 PROCEDURE — 0651 HSPC ROUTINE HOME CARE

## 2020-04-06 ENCOUNTER — HOME CARE VISIT (OUTPATIENT)
Dept: SCHEDULING | Facility: HOME HEALTH | Age: 85
End: 2020-04-06
Payer: MEDICARE

## 2020-04-06 PROCEDURE — G0156 HHCP-SVS OF AIDE,EA 15 MIN: HCPCS

## 2020-04-06 PROCEDURE — 0651 HSPC ROUTINE HOME CARE

## 2020-04-07 ENCOUNTER — HOME CARE VISIT (OUTPATIENT)
Dept: SCHEDULING | Facility: HOME HEALTH | Age: 85
End: 2020-04-07
Payer: MEDICARE

## 2020-04-07 VITALS
SYSTOLIC BLOOD PRESSURE: 135 MMHG | RESPIRATION RATE: 18 BRPM | HEART RATE: 55 BPM | DIASTOLIC BLOOD PRESSURE: 56 MMHG | TEMPERATURE: 98.3 F

## 2020-04-07 PROCEDURE — 0651 HSPC ROUTINE HOME CARE

## 2020-04-07 PROCEDURE — G0299 HHS/HOSPICE OF RN EA 15 MIN: HCPCS

## 2020-04-08 ENCOUNTER — HOME CARE VISIT (OUTPATIENT)
Dept: SCHEDULING | Facility: HOME HEALTH | Age: 85
End: 2020-04-08
Payer: MEDICARE

## 2020-04-08 PROCEDURE — G0156 HHCP-SVS OF AIDE,EA 15 MIN: HCPCS

## 2020-04-08 PROCEDURE — 0651 HSPC ROUTINE HOME CARE

## 2020-04-09 PROCEDURE — 0651 HSPC ROUTINE HOME CARE

## 2020-04-10 ENCOUNTER — HOME CARE VISIT (OUTPATIENT)
Dept: HOSPICE | Facility: HOSPICE | Age: 85
End: 2020-04-10
Payer: MEDICARE

## 2020-04-10 ENCOUNTER — HOME CARE VISIT (OUTPATIENT)
Dept: SCHEDULING | Facility: HOME HEALTH | Age: 85
End: 2020-04-10
Payer: MEDICARE

## 2020-04-10 PROCEDURE — G0156 HHCP-SVS OF AIDE,EA 15 MIN: HCPCS

## 2020-04-10 PROCEDURE — 0651 HSPC ROUTINE HOME CARE

## 2020-04-11 PROCEDURE — 0651 HSPC ROUTINE HOME CARE

## 2020-04-12 PROCEDURE — 0651 HSPC ROUTINE HOME CARE

## 2020-04-13 ENCOUNTER — HOME CARE VISIT (OUTPATIENT)
Dept: SCHEDULING | Facility: HOME HEALTH | Age: 85
End: 2020-04-13
Payer: MEDICARE

## 2020-04-13 PROCEDURE — A4338 INDWELLING CATHETER LATEX: HCPCS

## 2020-04-13 PROCEDURE — A4310 INSERT TRAY W/O BAG/CATH: HCPCS

## 2020-04-13 PROCEDURE — G0156 HHCP-SVS OF AIDE,EA 15 MIN: HCPCS

## 2020-04-13 PROCEDURE — G0299 HHS/HOSPICE OF RN EA 15 MIN: HCPCS

## 2020-04-13 PROCEDURE — 0651 HSPC ROUTINE HOME CARE

## 2020-04-14 VITALS
RESPIRATION RATE: 18 BRPM | HEART RATE: 61 BPM | SYSTOLIC BLOOD PRESSURE: 142 MMHG | DIASTOLIC BLOOD PRESSURE: 63 MMHG | TEMPERATURE: 98.3 F

## 2020-04-14 PROCEDURE — 0651 HSPC ROUTINE HOME CARE

## 2020-04-15 ENCOUNTER — HOME CARE VISIT (OUTPATIENT)
Dept: SCHEDULING | Facility: HOME HEALTH | Age: 85
End: 2020-04-15
Payer: MEDICARE

## 2020-04-15 PROCEDURE — 0651 HSPC ROUTINE HOME CARE

## 2020-04-15 PROCEDURE — G0156 HHCP-SVS OF AIDE,EA 15 MIN: HCPCS

## 2020-04-16 PROCEDURE — 0651 HSPC ROUTINE HOME CARE

## 2020-04-17 ENCOUNTER — HOME CARE VISIT (OUTPATIENT)
Dept: SCHEDULING | Facility: HOME HEALTH | Age: 85
End: 2020-04-17
Payer: MEDICARE

## 2020-04-17 PROCEDURE — 0651 HSPC ROUTINE HOME CARE

## 2020-04-17 PROCEDURE — G0156 HHCP-SVS OF AIDE,EA 15 MIN: HCPCS

## 2020-04-18 PROCEDURE — 0651 HSPC ROUTINE HOME CARE

## 2020-04-19 PROCEDURE — 0651 HSPC ROUTINE HOME CARE

## 2020-04-20 ENCOUNTER — HOME CARE VISIT (OUTPATIENT)
Dept: SCHEDULING | Facility: HOME HEALTH | Age: 85
End: 2020-04-20
Payer: MEDICARE

## 2020-04-20 PROCEDURE — G0156 HHCP-SVS OF AIDE,EA 15 MIN: HCPCS

## 2020-04-20 PROCEDURE — 0651 HSPC ROUTINE HOME CARE

## 2020-04-21 PROCEDURE — 0651 HSPC ROUTINE HOME CARE

## 2020-04-22 ENCOUNTER — HOME CARE VISIT (OUTPATIENT)
Dept: SCHEDULING | Facility: HOME HEALTH | Age: 85
End: 2020-04-22
Payer: MEDICARE

## 2020-04-22 PROCEDURE — G0156 HHCP-SVS OF AIDE,EA 15 MIN: HCPCS

## 2020-04-22 PROCEDURE — 0651 HSPC ROUTINE HOME CARE

## 2020-04-22 PROCEDURE — G0299 HHS/HOSPICE OF RN EA 15 MIN: HCPCS

## 2020-04-23 VITALS
SYSTOLIC BLOOD PRESSURE: 170 MMHG | DIASTOLIC BLOOD PRESSURE: 80 MMHG | TEMPERATURE: 98.9 F | HEART RATE: 71 BPM | RESPIRATION RATE: 18 BRPM

## 2020-04-23 PROCEDURE — 0651 HSPC ROUTINE HOME CARE

## 2020-04-24 ENCOUNTER — HOME CARE VISIT (OUTPATIENT)
Dept: SCHEDULING | Facility: HOME HEALTH | Age: 85
End: 2020-04-24
Payer: MEDICARE

## 2020-04-24 PROCEDURE — T4541 LARGE DISPOSABLE UNDERPAD: HCPCS

## 2020-04-24 PROCEDURE — T4522 ADULT SIZE BRIEF/DIAPER MED: HCPCS

## 2020-04-24 PROCEDURE — G0156 HHCP-SVS OF AIDE,EA 15 MIN: HCPCS

## 2020-04-24 PROCEDURE — A6250 SKIN SEAL PROTECT MOISTURIZR: HCPCS

## 2020-04-24 PROCEDURE — 0651 HSPC ROUTINE HOME CARE

## 2020-04-25 PROCEDURE — 0651 HSPC ROUTINE HOME CARE

## 2020-04-26 PROCEDURE — 0651 HSPC ROUTINE HOME CARE

## 2020-04-27 ENCOUNTER — HOME CARE VISIT (OUTPATIENT)
Dept: SCHEDULING | Facility: HOME HEALTH | Age: 85
End: 2020-04-27
Payer: MEDICARE

## 2020-04-27 VITALS
HEART RATE: 76 BPM | SYSTOLIC BLOOD PRESSURE: 105 MMHG | DIASTOLIC BLOOD PRESSURE: 69 MMHG | RESPIRATION RATE: 18 BRPM | TEMPERATURE: 98.1 F

## 2020-04-27 PROCEDURE — G0156 HHCP-SVS OF AIDE,EA 15 MIN: HCPCS

## 2020-04-27 PROCEDURE — 0651 HSPC ROUTINE HOME CARE

## 2020-04-27 PROCEDURE — G0299 HHS/HOSPICE OF RN EA 15 MIN: HCPCS

## 2020-04-28 PROCEDURE — 0651 HSPC ROUTINE HOME CARE

## 2020-04-29 ENCOUNTER — HOME CARE VISIT (OUTPATIENT)
Dept: SCHEDULING | Facility: HOME HEALTH | Age: 85
End: 2020-04-29
Payer: MEDICARE

## 2020-04-29 PROCEDURE — G0156 HHCP-SVS OF AIDE,EA 15 MIN: HCPCS

## 2020-04-29 PROCEDURE — 0651 HSPC ROUTINE HOME CARE

## 2020-04-30 ENCOUNTER — HOME CARE VISIT (OUTPATIENT)
Dept: SCHEDULING | Facility: HOME HEALTH | Age: 85
End: 2020-04-30
Payer: MEDICARE

## 2020-04-30 PROCEDURE — 0651 HSPC ROUTINE HOME CARE

## 2020-04-30 PROCEDURE — G0155 HHCP-SVS OF CSW,EA 15 MIN: HCPCS

## 2020-05-01 ENCOUNTER — HOME CARE VISIT (OUTPATIENT)
Dept: SCHEDULING | Facility: HOME HEALTH | Age: 85
End: 2020-05-01
Payer: MEDICARE

## 2020-05-01 PROCEDURE — G0156 HHCP-SVS OF AIDE,EA 15 MIN: HCPCS

## 2020-05-01 PROCEDURE — 0651 HSPC ROUTINE HOME CARE

## 2020-05-02 PROCEDURE — 0651 HSPC ROUTINE HOME CARE

## 2020-05-03 PROCEDURE — 0651 HSPC ROUTINE HOME CARE

## 2020-05-04 ENCOUNTER — HOME CARE VISIT (OUTPATIENT)
Dept: SCHEDULING | Facility: HOME HEALTH | Age: 85
End: 2020-05-04
Payer: MEDICARE

## 2020-05-04 VITALS
SYSTOLIC BLOOD PRESSURE: 129 MMHG | DIASTOLIC BLOOD PRESSURE: 68 MMHG | RESPIRATION RATE: 18 BRPM | HEART RATE: 66 BPM | TEMPERATURE: 98.3 F

## 2020-05-04 PROCEDURE — G0299 HHS/HOSPICE OF RN EA 15 MIN: HCPCS

## 2020-05-04 PROCEDURE — T4522 ADULT SIZE BRIEF/DIAPER MED: HCPCS

## 2020-05-04 PROCEDURE — G0156 HHCP-SVS OF AIDE,EA 15 MIN: HCPCS

## 2020-05-04 PROCEDURE — 0651 HSPC ROUTINE HOME CARE

## 2020-05-05 PROCEDURE — 0651 HSPC ROUTINE HOME CARE

## 2020-05-06 ENCOUNTER — HOME CARE VISIT (OUTPATIENT)
Dept: SCHEDULING | Facility: HOME HEALTH | Age: 85
End: 2020-05-06
Payer: MEDICARE

## 2020-05-06 PROCEDURE — G0156 HHCP-SVS OF AIDE,EA 15 MIN: HCPCS

## 2020-05-06 PROCEDURE — 0651 HSPC ROUTINE HOME CARE

## 2020-05-07 PROCEDURE — 0651 HSPC ROUTINE HOME CARE

## 2020-05-08 ENCOUNTER — HOME CARE VISIT (OUTPATIENT)
Dept: SCHEDULING | Facility: HOME HEALTH | Age: 85
End: 2020-05-08
Payer: MEDICARE

## 2020-05-08 PROCEDURE — 0651 HSPC ROUTINE HOME CARE

## 2020-05-08 PROCEDURE — G0156 HHCP-SVS OF AIDE,EA 15 MIN: HCPCS

## 2020-05-09 PROCEDURE — 0651 HSPC ROUTINE HOME CARE

## 2020-05-10 PROCEDURE — 0651 HSPC ROUTINE HOME CARE

## 2020-05-11 ENCOUNTER — HOME CARE VISIT (OUTPATIENT)
Dept: HOSPICE | Facility: HOSPICE | Age: 85
End: 2020-05-11
Payer: MEDICARE

## 2020-05-11 ENCOUNTER — HOME CARE VISIT (OUTPATIENT)
Dept: SCHEDULING | Facility: HOME HEALTH | Age: 85
End: 2020-05-11
Payer: MEDICARE

## 2020-05-11 VITALS
SYSTOLIC BLOOD PRESSURE: 143 MMHG | TEMPERATURE: 97.7 F | RESPIRATION RATE: 18 BRPM | DIASTOLIC BLOOD PRESSURE: 57 MMHG | HEART RATE: 58 BPM

## 2020-05-11 PROCEDURE — 0651 HSPC ROUTINE HOME CARE

## 2020-05-11 PROCEDURE — G0156 HHCP-SVS OF AIDE,EA 15 MIN: HCPCS

## 2020-05-11 PROCEDURE — G0299 HHS/HOSPICE OF RN EA 15 MIN: HCPCS

## 2020-05-12 PROCEDURE — A4314 CATH W/DRAINAGE 2-WAY LATEX: HCPCS

## 2020-05-12 PROCEDURE — 0651 HSPC ROUTINE HOME CARE

## 2020-05-13 ENCOUNTER — HOME CARE VISIT (OUTPATIENT)
Dept: SCHEDULING | Facility: HOME HEALTH | Age: 85
End: 2020-05-13
Payer: MEDICARE

## 2020-05-13 PROCEDURE — 0651 HSPC ROUTINE HOME CARE

## 2020-05-13 PROCEDURE — G0156 HHCP-SVS OF AIDE,EA 15 MIN: HCPCS

## 2020-05-14 PROCEDURE — 0651 HSPC ROUTINE HOME CARE

## 2020-05-15 ENCOUNTER — HOME CARE VISIT (OUTPATIENT)
Dept: SCHEDULING | Facility: HOME HEALTH | Age: 85
End: 2020-05-15
Payer: MEDICARE

## 2020-05-15 PROCEDURE — 0651 HSPC ROUTINE HOME CARE

## 2020-05-15 PROCEDURE — G0156 HHCP-SVS OF AIDE,EA 15 MIN: HCPCS

## 2020-05-16 PROCEDURE — 0651 HSPC ROUTINE HOME CARE

## 2020-05-17 PROCEDURE — 0651 HSPC ROUTINE HOME CARE

## 2020-05-18 ENCOUNTER — HOME CARE VISIT (OUTPATIENT)
Dept: SCHEDULING | Facility: HOME HEALTH | Age: 85
End: 2020-05-18
Payer: MEDICARE

## 2020-05-18 PROCEDURE — 0651 HSPC ROUTINE HOME CARE

## 2020-05-18 PROCEDURE — G0156 HHCP-SVS OF AIDE,EA 15 MIN: HCPCS

## 2020-05-19 PROCEDURE — 0651 HSPC ROUTINE HOME CARE

## 2020-05-20 ENCOUNTER — HOME CARE VISIT (OUTPATIENT)
Dept: SCHEDULING | Facility: HOME HEALTH | Age: 85
End: 2020-05-20
Payer: MEDICARE

## 2020-05-20 ENCOUNTER — HOME CARE VISIT (OUTPATIENT)
Dept: HOSPICE | Facility: HOSPICE | Age: 85
End: 2020-05-20
Payer: MEDICARE

## 2020-05-20 PROCEDURE — G0299 HHS/HOSPICE OF RN EA 15 MIN: HCPCS

## 2020-05-20 PROCEDURE — G0155 HHCP-SVS OF CSW,EA 15 MIN: HCPCS

## 2020-05-20 PROCEDURE — 0651 HSPC ROUTINE HOME CARE

## 2020-05-20 PROCEDURE — T4541 LARGE DISPOSABLE UNDERPAD: HCPCS

## 2020-05-20 PROCEDURE — G0156 HHCP-SVS OF AIDE,EA 15 MIN: HCPCS

## 2020-05-21 ENCOUNTER — HOME CARE VISIT (OUTPATIENT)
Dept: SCHEDULING | Facility: HOME HEALTH | Age: 85
End: 2020-05-21
Payer: MEDICARE

## 2020-05-21 VITALS
RESPIRATION RATE: 18 BRPM | TEMPERATURE: 97.6 F | DIASTOLIC BLOOD PRESSURE: 84 MMHG | HEART RATE: 68 BPM | OXYGEN SATURATION: 95 % | SYSTOLIC BLOOD PRESSURE: 161 MMHG

## 2020-05-21 PROCEDURE — 0651 HSPC ROUTINE HOME CARE

## 2020-05-22 ENCOUNTER — HOME CARE VISIT (OUTPATIENT)
Dept: SCHEDULING | Facility: HOME HEALTH | Age: 85
End: 2020-05-22
Payer: MEDICARE

## 2020-05-22 PROCEDURE — G0156 HHCP-SVS OF AIDE,EA 15 MIN: HCPCS

## 2020-05-22 PROCEDURE — 0651 HSPC ROUTINE HOME CARE

## 2020-05-23 PROCEDURE — 0651 HSPC ROUTINE HOME CARE

## 2020-05-24 PROCEDURE — 0651 HSPC ROUTINE HOME CARE

## 2020-05-25 PROCEDURE — 0651 HSPC ROUTINE HOME CARE

## 2020-05-26 PROCEDURE — 0651 HSPC ROUTINE HOME CARE

## 2020-05-27 ENCOUNTER — HOME CARE VISIT (OUTPATIENT)
Dept: SCHEDULING | Facility: HOME HEALTH | Age: 85
End: 2020-05-27
Payer: MEDICARE

## 2020-05-27 PROCEDURE — 0651 HSPC ROUTINE HOME CARE

## 2020-05-27 PROCEDURE — G0299 HHS/HOSPICE OF RN EA 15 MIN: HCPCS

## 2020-05-27 PROCEDURE — G0156 HHCP-SVS OF AIDE,EA 15 MIN: HCPCS

## 2020-05-28 VITALS
OXYGEN SATURATION: 87 % | DIASTOLIC BLOOD PRESSURE: 45 MMHG | TEMPERATURE: 97.9 F | RESPIRATION RATE: 16 BRPM | HEART RATE: 69 BPM | SYSTOLIC BLOOD PRESSURE: 145 MMHG

## 2020-05-28 PROCEDURE — 0651 HSPC ROUTINE HOME CARE

## 2020-05-29 ENCOUNTER — HOME CARE VISIT (OUTPATIENT)
Dept: SCHEDULING | Facility: HOME HEALTH | Age: 85
End: 2020-05-29
Payer: MEDICARE

## 2020-05-29 PROCEDURE — G0156 HHCP-SVS OF AIDE,EA 15 MIN: HCPCS

## 2020-05-29 PROCEDURE — 0651 HSPC ROUTINE HOME CARE

## 2020-05-30 PROCEDURE — 0651 HSPC ROUTINE HOME CARE

## 2020-05-31 PROCEDURE — 0651 HSPC ROUTINE HOME CARE

## 2020-06-01 ENCOUNTER — HOME CARE VISIT (OUTPATIENT)
Dept: SCHEDULING | Facility: HOME HEALTH | Age: 85
End: 2020-06-01
Payer: MEDICARE

## 2020-06-01 PROCEDURE — 0651 HSPC ROUTINE HOME CARE

## 2020-06-01 PROCEDURE — G0156 HHCP-SVS OF AIDE,EA 15 MIN: HCPCS

## 2020-06-02 ENCOUNTER — HOME CARE VISIT (OUTPATIENT)
Dept: SCHEDULING | Facility: HOME HEALTH | Age: 85
End: 2020-06-02
Payer: MEDICARE

## 2020-06-02 VITALS
DIASTOLIC BLOOD PRESSURE: 70 MMHG | HEART RATE: 82 BPM | RESPIRATION RATE: 16 BRPM | TEMPERATURE: 97.7 F | OXYGEN SATURATION: 97 % | SYSTOLIC BLOOD PRESSURE: 114 MMHG

## 2020-06-02 PROCEDURE — G0299 HHS/HOSPICE OF RN EA 15 MIN: HCPCS

## 2020-06-02 PROCEDURE — 0651 HSPC ROUTINE HOME CARE

## 2020-06-03 ENCOUNTER — HOME CARE VISIT (OUTPATIENT)
Dept: SCHEDULING | Facility: HOME HEALTH | Age: 85
End: 2020-06-03
Payer: MEDICARE

## 2020-06-03 PROCEDURE — 0651 HSPC ROUTINE HOME CARE

## 2020-06-03 PROCEDURE — T4541 LARGE DISPOSABLE UNDERPAD: HCPCS

## 2020-06-03 PROCEDURE — G0156 HHCP-SVS OF AIDE,EA 15 MIN: HCPCS

## 2020-06-04 PROCEDURE — 0651 HSPC ROUTINE HOME CARE

## 2020-06-05 ENCOUNTER — HOME CARE VISIT (OUTPATIENT)
Dept: HOSPICE | Facility: HOSPICE | Age: 85
End: 2020-06-05
Payer: MEDICARE

## 2020-06-05 ENCOUNTER — HOME CARE VISIT (OUTPATIENT)
Dept: SCHEDULING | Facility: HOME HEALTH | Age: 85
End: 2020-06-05
Payer: MEDICARE

## 2020-06-05 PROCEDURE — 0651 HSPC ROUTINE HOME CARE

## 2020-06-05 PROCEDURE — G0156 HHCP-SVS OF AIDE,EA 15 MIN: HCPCS

## 2020-06-06 PROCEDURE — 0651 HSPC ROUTINE HOME CARE

## 2020-06-07 PROCEDURE — 0651 HSPC ROUTINE HOME CARE

## 2020-06-08 ENCOUNTER — HOME CARE VISIT (OUTPATIENT)
Dept: SCHEDULING | Facility: HOME HEALTH | Age: 85
End: 2020-06-08
Payer: MEDICARE

## 2020-06-08 PROCEDURE — A6250 SKIN SEAL PROTECT MOISTURIZR: HCPCS

## 2020-06-08 PROCEDURE — 0651 HSPC ROUTINE HOME CARE

## 2020-06-08 PROCEDURE — A4314 CATH W/DRAINAGE 2-WAY LATEX: HCPCS

## 2020-06-08 PROCEDURE — G0156 HHCP-SVS OF AIDE,EA 15 MIN: HCPCS

## 2020-06-08 PROCEDURE — T4541 LARGE DISPOSABLE UNDERPAD: HCPCS

## 2020-06-08 PROCEDURE — G0299 HHS/HOSPICE OF RN EA 15 MIN: HCPCS

## 2020-06-08 PROCEDURE — T4522 ADULT SIZE BRIEF/DIAPER MED: HCPCS

## 2020-06-09 VITALS
SYSTOLIC BLOOD PRESSURE: 140 MMHG | HEART RATE: 66 BPM | OXYGEN SATURATION: 96 % | TEMPERATURE: 97.4 F | RESPIRATION RATE: 18 BRPM | DIASTOLIC BLOOD PRESSURE: 83 MMHG

## 2020-06-09 PROCEDURE — 0651 HSPC ROUTINE HOME CARE

## 2020-06-10 ENCOUNTER — HOME CARE VISIT (OUTPATIENT)
Dept: SCHEDULING | Facility: HOME HEALTH | Age: 85
End: 2020-06-10
Payer: MEDICARE

## 2020-06-10 ENCOUNTER — HOME CARE VISIT (OUTPATIENT)
Dept: HOSPICE | Facility: HOSPICE | Age: 85
End: 2020-06-10
Payer: MEDICARE

## 2020-06-10 PROCEDURE — G0156 HHCP-SVS OF AIDE,EA 15 MIN: HCPCS

## 2020-06-10 PROCEDURE — 0651 HSPC ROUTINE HOME CARE

## 2020-06-11 PROCEDURE — 0651 HSPC ROUTINE HOME CARE

## 2020-06-12 ENCOUNTER — HOME CARE VISIT (OUTPATIENT)
Dept: SCHEDULING | Facility: HOME HEALTH | Age: 85
End: 2020-06-12
Payer: MEDICARE

## 2020-06-12 PROCEDURE — 0651 HSPC ROUTINE HOME CARE

## 2020-06-12 PROCEDURE — G0156 HHCP-SVS OF AIDE,EA 15 MIN: HCPCS

## 2020-06-13 PROCEDURE — 0651 HSPC ROUTINE HOME CARE

## 2020-06-14 PROCEDURE — 0651 HSPC ROUTINE HOME CARE

## 2020-06-15 ENCOUNTER — HOME CARE VISIT (OUTPATIENT)
Dept: SCHEDULING | Facility: HOME HEALTH | Age: 85
End: 2020-06-15
Payer: MEDICARE

## 2020-06-15 PROCEDURE — 0651 HSPC ROUTINE HOME CARE

## 2020-06-15 PROCEDURE — G0156 HHCP-SVS OF AIDE,EA 15 MIN: HCPCS

## 2020-06-15 PROCEDURE — G0299 HHS/HOSPICE OF RN EA 15 MIN: HCPCS

## 2020-06-16 VITALS
TEMPERATURE: 98.1 F | RESPIRATION RATE: 18 BRPM | SYSTOLIC BLOOD PRESSURE: 156 MMHG | DIASTOLIC BLOOD PRESSURE: 89 MMHG | HEART RATE: 66 BPM

## 2020-06-16 PROCEDURE — 0651 HSPC ROUTINE HOME CARE

## 2020-06-17 ENCOUNTER — HOME CARE VISIT (OUTPATIENT)
Dept: SCHEDULING | Facility: HOME HEALTH | Age: 85
End: 2020-06-17
Payer: MEDICARE

## 2020-06-17 PROCEDURE — G0156 HHCP-SVS OF AIDE,EA 15 MIN: HCPCS

## 2020-06-17 PROCEDURE — 0651 HSPC ROUTINE HOME CARE

## 2020-06-18 PROCEDURE — 0651 HSPC ROUTINE HOME CARE

## 2020-06-19 ENCOUNTER — HOME CARE VISIT (OUTPATIENT)
Dept: SCHEDULING | Facility: HOME HEALTH | Age: 85
End: 2020-06-19
Payer: MEDICARE

## 2020-06-19 PROCEDURE — 0651 HSPC ROUTINE HOME CARE

## 2020-06-19 PROCEDURE — G0156 HHCP-SVS OF AIDE,EA 15 MIN: HCPCS

## 2020-06-20 PROCEDURE — 0651 HSPC ROUTINE HOME CARE

## 2020-06-21 PROCEDURE — 0651 HSPC ROUTINE HOME CARE

## 2020-06-22 ENCOUNTER — HOME CARE VISIT (OUTPATIENT)
Dept: SCHEDULING | Facility: HOME HEALTH | Age: 85
End: 2020-06-22
Payer: MEDICARE

## 2020-06-22 ENCOUNTER — HOME CARE VISIT (OUTPATIENT)
Dept: HOSPICE | Facility: HOSPICE | Age: 85
End: 2020-06-22
Payer: MEDICARE

## 2020-06-22 PROCEDURE — 0651 HSPC ROUTINE HOME CARE

## 2020-06-22 PROCEDURE — G0299 HHS/HOSPICE OF RN EA 15 MIN: HCPCS

## 2020-06-22 PROCEDURE — A6250 SKIN SEAL PROTECT MOISTURIZR: HCPCS

## 2020-06-22 PROCEDURE — HOSPICE MEDICATION HC HH HOSPICE MEDICATION

## 2020-06-22 PROCEDURE — G0156 HHCP-SVS OF AIDE,EA 15 MIN: HCPCS

## 2020-06-22 PROCEDURE — G0155 HHCP-SVS OF CSW,EA 15 MIN: HCPCS

## 2020-06-22 PROCEDURE — T4522 ADULT SIZE BRIEF/DIAPER MED: HCPCS

## 2020-06-23 VITALS
SYSTOLIC BLOOD PRESSURE: 120 MMHG | OXYGEN SATURATION: 98 % | HEART RATE: 80 BPM | RESPIRATION RATE: 18 BRPM | TEMPERATURE: 98.2 F | DIASTOLIC BLOOD PRESSURE: 79 MMHG

## 2020-06-23 PROCEDURE — 0651 HSPC ROUTINE HOME CARE

## 2020-06-24 ENCOUNTER — HOME CARE VISIT (OUTPATIENT)
Dept: SCHEDULING | Facility: HOME HEALTH | Age: 85
End: 2020-06-24
Payer: MEDICARE

## 2020-06-24 PROCEDURE — 0651 HSPC ROUTINE HOME CARE

## 2020-06-24 PROCEDURE — G0156 HHCP-SVS OF AIDE,EA 15 MIN: HCPCS

## 2020-06-25 PROCEDURE — 0651 HSPC ROUTINE HOME CARE

## 2020-06-26 ENCOUNTER — HOME CARE VISIT (OUTPATIENT)
Dept: SCHEDULING | Facility: HOME HEALTH | Age: 85
End: 2020-06-26
Payer: MEDICARE

## 2020-06-26 PROCEDURE — 0651 HSPC ROUTINE HOME CARE

## 2020-06-26 PROCEDURE — G0156 HHCP-SVS OF AIDE,EA 15 MIN: HCPCS

## 2020-06-27 PROCEDURE — 0651 HSPC ROUTINE HOME CARE

## 2020-06-28 PROCEDURE — 0651 HSPC ROUTINE HOME CARE

## 2020-06-29 ENCOUNTER — HOME CARE VISIT (OUTPATIENT)
Dept: SCHEDULING | Facility: HOME HEALTH | Age: 85
End: 2020-06-29
Payer: MEDICARE

## 2020-06-29 VITALS
RESPIRATION RATE: 16 BRPM | OXYGEN SATURATION: 99 % | TEMPERATURE: 98.4 F | DIASTOLIC BLOOD PRESSURE: 69 MMHG | SYSTOLIC BLOOD PRESSURE: 137 MMHG | HEART RATE: 75 BPM

## 2020-06-29 PROCEDURE — 0651 HSPC ROUTINE HOME CARE

## 2020-06-29 PROCEDURE — G0156 HHCP-SVS OF AIDE,EA 15 MIN: HCPCS

## 2020-06-29 PROCEDURE — G0299 HHS/HOSPICE OF RN EA 15 MIN: HCPCS

## 2020-06-30 PROCEDURE — 0651 HSPC ROUTINE HOME CARE

## 2020-07-01 ENCOUNTER — HOME CARE VISIT (OUTPATIENT)
Dept: SCHEDULING | Facility: HOME HEALTH | Age: 85
End: 2020-07-01
Payer: MEDICARE

## 2020-07-01 PROCEDURE — G0156 HHCP-SVS OF AIDE,EA 15 MIN: HCPCS

## 2020-07-01 PROCEDURE — 0651 HSPC ROUTINE HOME CARE

## 2020-07-02 PROCEDURE — 0651 HSPC ROUTINE HOME CARE

## 2020-07-03 ENCOUNTER — HOME CARE VISIT (OUTPATIENT)
Dept: SCHEDULING | Facility: HOME HEALTH | Age: 85
End: 2020-07-03
Payer: MEDICARE

## 2020-07-03 PROCEDURE — G0156 HHCP-SVS OF AIDE,EA 15 MIN: HCPCS

## 2020-07-03 PROCEDURE — 0651 HSPC ROUTINE HOME CARE

## 2020-07-04 PROCEDURE — 0651 HSPC ROUTINE HOME CARE

## 2020-07-05 PROCEDURE — 0651 HSPC ROUTINE HOME CARE

## 2020-07-06 ENCOUNTER — HOME CARE VISIT (OUTPATIENT)
Dept: SCHEDULING | Facility: HOME HEALTH | Age: 85
End: 2020-07-06
Payer: MEDICARE

## 2020-07-06 ENCOUNTER — HOME CARE VISIT (OUTPATIENT)
Dept: HOSPICE | Facility: HOSPICE | Age: 85
End: 2020-07-06
Payer: MEDICARE

## 2020-07-06 VITALS
SYSTOLIC BLOOD PRESSURE: 163 MMHG | TEMPERATURE: 98.3 F | OXYGEN SATURATION: 93 % | RESPIRATION RATE: 68 BRPM | DIASTOLIC BLOOD PRESSURE: 81 MMHG | HEART RATE: 68 BPM

## 2020-07-06 PROCEDURE — A4314 CATH W/DRAINAGE 2-WAY LATEX: HCPCS

## 2020-07-06 PROCEDURE — G0156 HHCP-SVS OF AIDE,EA 15 MIN: HCPCS

## 2020-07-06 PROCEDURE — A4310 INSERT TRAY W/O BAG/CATH: HCPCS

## 2020-07-06 PROCEDURE — 0651 HSPC ROUTINE HOME CARE

## 2020-07-06 PROCEDURE — G0299 HHS/HOSPICE OF RN EA 15 MIN: HCPCS

## 2020-07-06 PROCEDURE — A4344 CATH INDW FOLEY 2 WAY SILICN: HCPCS

## 2020-07-07 PROCEDURE — 0651 HSPC ROUTINE HOME CARE

## 2020-07-08 ENCOUNTER — HOME CARE VISIT (OUTPATIENT)
Dept: SCHEDULING | Facility: HOME HEALTH | Age: 85
End: 2020-07-08
Payer: MEDICARE

## 2020-07-08 PROCEDURE — 0651 HSPC ROUTINE HOME CARE

## 2020-07-08 PROCEDURE — G0156 HHCP-SVS OF AIDE,EA 15 MIN: HCPCS

## 2020-07-09 PROCEDURE — 0651 HSPC ROUTINE HOME CARE

## 2020-07-10 ENCOUNTER — HOME CARE VISIT (OUTPATIENT)
Dept: SCHEDULING | Facility: HOME HEALTH | Age: 85
End: 2020-07-10
Payer: MEDICARE

## 2020-07-10 PROCEDURE — G0156 HHCP-SVS OF AIDE,EA 15 MIN: HCPCS

## 2020-07-10 PROCEDURE — 0651 HSPC ROUTINE HOME CARE

## 2020-07-11 PROCEDURE — 0651 HSPC ROUTINE HOME CARE

## 2020-07-12 PROCEDURE — 0651 HSPC ROUTINE HOME CARE

## 2020-07-13 ENCOUNTER — HOME CARE VISIT (OUTPATIENT)
Dept: SCHEDULING | Facility: HOME HEALTH | Age: 85
End: 2020-07-13
Payer: MEDICARE

## 2020-07-13 PROCEDURE — 0651 HSPC ROUTINE HOME CARE

## 2020-07-13 PROCEDURE — G0156 HHCP-SVS OF AIDE,EA 15 MIN: HCPCS

## 2020-07-13 PROCEDURE — G0299 HHS/HOSPICE OF RN EA 15 MIN: HCPCS

## 2020-07-13 PROCEDURE — T4523 ADULT SIZE BRIEF/DIAPER LG: HCPCS

## 2020-07-13 PROCEDURE — T4541 LARGE DISPOSABLE UNDERPAD: HCPCS

## 2020-07-14 VITALS
HEART RATE: 71 BPM | TEMPERATURE: 97.1 F | RESPIRATION RATE: 20 BRPM | DIASTOLIC BLOOD PRESSURE: 71 MMHG | OXYGEN SATURATION: 97 % | SYSTOLIC BLOOD PRESSURE: 147 MMHG

## 2020-07-14 PROCEDURE — 0651 HSPC ROUTINE HOME CARE

## 2020-07-15 ENCOUNTER — HOME CARE VISIT (OUTPATIENT)
Dept: SCHEDULING | Facility: HOME HEALTH | Age: 85
End: 2020-07-15
Payer: MEDICARE

## 2020-07-15 PROCEDURE — G0156 HHCP-SVS OF AIDE,EA 15 MIN: HCPCS

## 2020-07-15 PROCEDURE — 0651 HSPC ROUTINE HOME CARE

## 2020-07-16 ENCOUNTER — HOME CARE VISIT (OUTPATIENT)
Dept: SCHEDULING | Facility: HOME HEALTH | Age: 85
End: 2020-07-16
Payer: MEDICARE

## 2020-07-16 PROCEDURE — 0651 HSPC ROUTINE HOME CARE

## 2020-07-17 ENCOUNTER — HOME CARE VISIT (OUTPATIENT)
Dept: SCHEDULING | Facility: HOME HEALTH | Age: 85
End: 2020-07-17
Payer: MEDICARE

## 2020-07-17 PROCEDURE — 0651 HSPC ROUTINE HOME CARE

## 2020-07-17 PROCEDURE — G0156 HHCP-SVS OF AIDE,EA 15 MIN: HCPCS

## 2020-07-18 PROCEDURE — 0651 HSPC ROUTINE HOME CARE

## 2020-07-19 PROCEDURE — 0651 HSPC ROUTINE HOME CARE

## 2020-07-20 ENCOUNTER — HOME CARE VISIT (OUTPATIENT)
Dept: SCHEDULING | Facility: HOME HEALTH | Age: 85
End: 2020-07-20
Payer: MEDICARE

## 2020-07-20 PROCEDURE — G0156 HHCP-SVS OF AIDE,EA 15 MIN: HCPCS

## 2020-07-20 PROCEDURE — 0651 HSPC ROUTINE HOME CARE

## 2020-07-21 ENCOUNTER — HOME CARE VISIT (OUTPATIENT)
Dept: SCHEDULING | Facility: HOME HEALTH | Age: 85
End: 2020-07-21
Payer: MEDICARE

## 2020-07-21 PROCEDURE — G0155 HHCP-SVS OF CSW,EA 15 MIN: HCPCS

## 2020-07-21 PROCEDURE — 0651 HSPC ROUTINE HOME CARE

## 2020-07-22 ENCOUNTER — HOME CARE VISIT (OUTPATIENT)
Dept: SCHEDULING | Facility: HOME HEALTH | Age: 85
End: 2020-07-22
Payer: MEDICARE

## 2020-07-22 ENCOUNTER — HOME CARE VISIT (OUTPATIENT)
Dept: HOSPICE | Facility: HOSPICE | Age: 85
End: 2020-07-22
Payer: MEDICARE

## 2020-07-22 PROCEDURE — G0156 HHCP-SVS OF AIDE,EA 15 MIN: HCPCS

## 2020-07-22 PROCEDURE — 0651 HSPC ROUTINE HOME CARE

## 2020-07-22 PROCEDURE — G0299 HHS/HOSPICE OF RN EA 15 MIN: HCPCS

## 2020-07-22 PROCEDURE — HOSPICE MEDICATION HC HH HOSPICE MEDICATION

## 2020-07-23 PROCEDURE — 0651 HSPC ROUTINE HOME CARE

## 2020-07-24 ENCOUNTER — HOME CARE VISIT (OUTPATIENT)
Dept: SCHEDULING | Facility: HOME HEALTH | Age: 85
End: 2020-07-24
Payer: MEDICARE

## 2020-07-24 VITALS — HEART RATE: 80 BPM | SYSTOLIC BLOOD PRESSURE: 140 MMHG | RESPIRATION RATE: 20 BRPM | DIASTOLIC BLOOD PRESSURE: 90 MMHG

## 2020-07-24 PROCEDURE — 0651 HSPC ROUTINE HOME CARE

## 2020-07-24 PROCEDURE — G0156 HHCP-SVS OF AIDE,EA 15 MIN: HCPCS

## 2020-07-25 PROCEDURE — 0651 HSPC ROUTINE HOME CARE

## 2020-07-26 PROCEDURE — 0651 HSPC ROUTINE HOME CARE

## 2020-07-27 ENCOUNTER — HOME CARE VISIT (OUTPATIENT)
Dept: SCHEDULING | Facility: HOME HEALTH | Age: 85
End: 2020-07-27
Payer: MEDICARE

## 2020-07-27 PROCEDURE — 0651 HSPC ROUTINE HOME CARE

## 2020-07-27 PROCEDURE — G0299 HHS/HOSPICE OF RN EA 15 MIN: HCPCS

## 2020-07-27 PROCEDURE — G0156 HHCP-SVS OF AIDE,EA 15 MIN: HCPCS

## 2020-07-28 VITALS
SYSTOLIC BLOOD PRESSURE: 135 MMHG | HEART RATE: 77 BPM | TEMPERATURE: 98.7 F | DIASTOLIC BLOOD PRESSURE: 77 MMHG | OXYGEN SATURATION: 99 % | RESPIRATION RATE: 16 BRPM

## 2020-07-28 PROCEDURE — T4523 ADULT SIZE BRIEF/DIAPER LG: HCPCS

## 2020-07-28 PROCEDURE — A6250 SKIN SEAL PROTECT MOISTURIZR: HCPCS

## 2020-07-28 PROCEDURE — 0651 HSPC ROUTINE HOME CARE

## 2020-07-28 PROCEDURE — T4541 LARGE DISPOSABLE UNDERPAD: HCPCS

## 2020-07-29 ENCOUNTER — HOME CARE VISIT (OUTPATIENT)
Dept: SCHEDULING | Facility: HOME HEALTH | Age: 85
End: 2020-07-29
Payer: MEDICARE

## 2020-07-29 PROCEDURE — 0651 HSPC ROUTINE HOME CARE

## 2020-07-29 PROCEDURE — G0156 HHCP-SVS OF AIDE,EA 15 MIN: HCPCS

## 2020-07-30 PROCEDURE — 0651 HSPC ROUTINE HOME CARE

## 2020-07-31 ENCOUNTER — HOME CARE VISIT (OUTPATIENT)
Dept: SCHEDULING | Facility: HOME HEALTH | Age: 85
End: 2020-07-31
Payer: MEDICARE

## 2020-07-31 PROCEDURE — 0651 HSPC ROUTINE HOME CARE

## 2020-07-31 PROCEDURE — G0156 HHCP-SVS OF AIDE,EA 15 MIN: HCPCS

## 2020-08-01 PROCEDURE — 0651 HSPC ROUTINE HOME CARE

## 2020-08-02 PROCEDURE — 0651 HSPC ROUTINE HOME CARE

## 2020-08-03 ENCOUNTER — HOME CARE VISIT (OUTPATIENT)
Dept: SCHEDULING | Facility: HOME HEALTH | Age: 85
End: 2020-08-03
Payer: MEDICARE

## 2020-08-03 PROCEDURE — T4541 LARGE DISPOSABLE UNDERPAD: HCPCS

## 2020-08-03 PROCEDURE — 0651 HSPC ROUTINE HOME CARE

## 2020-08-03 PROCEDURE — G0156 HHCP-SVS OF AIDE,EA 15 MIN: HCPCS

## 2020-08-03 PROCEDURE — T4523 ADULT SIZE BRIEF/DIAPER LG: HCPCS

## 2020-08-03 PROCEDURE — G0299 HHS/HOSPICE OF RN EA 15 MIN: HCPCS

## 2020-08-03 PROCEDURE — A6250 SKIN SEAL PROTECT MOISTURIZR: HCPCS

## 2020-08-04 VITALS
RESPIRATION RATE: 18 BRPM | HEART RATE: 64 BPM | DIASTOLIC BLOOD PRESSURE: 69 MMHG | SYSTOLIC BLOOD PRESSURE: 113 MMHG | TEMPERATURE: 97.5 F

## 2020-08-04 PROCEDURE — 0651 HSPC ROUTINE HOME CARE

## 2020-08-05 ENCOUNTER — HOME CARE VISIT (OUTPATIENT)
Dept: HOSPICE | Facility: HOSPICE | Age: 85
End: 2020-08-05
Payer: MEDICARE

## 2020-08-05 ENCOUNTER — HOME CARE VISIT (OUTPATIENT)
Dept: SCHEDULING | Facility: HOME HEALTH | Age: 85
End: 2020-08-05
Payer: MEDICARE

## 2020-08-05 PROCEDURE — G0156 HHCP-SVS OF AIDE,EA 15 MIN: HCPCS

## 2020-08-05 PROCEDURE — 0651 HSPC ROUTINE HOME CARE

## 2020-08-06 PROCEDURE — 0651 HSPC ROUTINE HOME CARE

## 2020-08-07 ENCOUNTER — HOME CARE VISIT (OUTPATIENT)
Dept: HOSPICE | Facility: HOSPICE | Age: 85
End: 2020-08-07
Payer: MEDICARE

## 2020-08-07 ENCOUNTER — HOME CARE VISIT (OUTPATIENT)
Dept: SCHEDULING | Facility: HOME HEALTH | Age: 85
End: 2020-08-07
Payer: MEDICARE

## 2020-08-07 PROCEDURE — G0156 HHCP-SVS OF AIDE,EA 15 MIN: HCPCS

## 2020-08-07 PROCEDURE — 0651 HSPC ROUTINE HOME CARE

## 2020-08-08 PROCEDURE — 0651 HSPC ROUTINE HOME CARE

## 2020-08-09 PROCEDURE — 0651 HSPC ROUTINE HOME CARE

## 2020-08-10 ENCOUNTER — HOME CARE VISIT (OUTPATIENT)
Dept: SCHEDULING | Facility: HOME HEALTH | Age: 85
End: 2020-08-10
Payer: MEDICARE

## 2020-08-10 VITALS
DIASTOLIC BLOOD PRESSURE: 65 MMHG | HEART RATE: 72 BPM | TEMPERATURE: 98.8 F | SYSTOLIC BLOOD PRESSURE: 134 MMHG | RESPIRATION RATE: 18 BRPM

## 2020-08-10 PROCEDURE — G0299 HHS/HOSPICE OF RN EA 15 MIN: HCPCS

## 2020-08-10 PROCEDURE — G0156 HHCP-SVS OF AIDE,EA 15 MIN: HCPCS

## 2020-08-10 PROCEDURE — 0651 HSPC ROUTINE HOME CARE

## 2020-08-11 PROCEDURE — 0651 HSPC ROUTINE HOME CARE

## 2020-08-12 ENCOUNTER — HOME CARE VISIT (OUTPATIENT)
Dept: SCHEDULING | Facility: HOME HEALTH | Age: 85
End: 2020-08-12
Payer: MEDICARE

## 2020-08-12 PROCEDURE — G0156 HHCP-SVS OF AIDE,EA 15 MIN: HCPCS

## 2020-08-12 PROCEDURE — 0651 HSPC ROUTINE HOME CARE

## 2020-08-13 PROCEDURE — 0651 HSPC ROUTINE HOME CARE

## 2020-08-14 ENCOUNTER — HOME CARE VISIT (OUTPATIENT)
Dept: SCHEDULING | Facility: HOME HEALTH | Age: 85
End: 2020-08-14
Payer: MEDICARE

## 2020-08-14 PROCEDURE — 0651 HSPC ROUTINE HOME CARE

## 2020-08-14 PROCEDURE — G0156 HHCP-SVS OF AIDE,EA 15 MIN: HCPCS

## 2020-08-15 PROCEDURE — 0651 HSPC ROUTINE HOME CARE

## 2020-08-16 PROCEDURE — 0651 HSPC ROUTINE HOME CARE

## 2020-08-17 ENCOUNTER — HOME CARE VISIT (OUTPATIENT)
Dept: SCHEDULING | Facility: HOME HEALTH | Age: 85
End: 2020-08-17
Payer: MEDICARE

## 2020-08-17 PROCEDURE — T4541 LARGE DISPOSABLE UNDERPAD: HCPCS

## 2020-08-17 PROCEDURE — T4524 ADULT SIZE BRIEF/DIAPER XL: HCPCS

## 2020-08-17 PROCEDURE — G0156 HHCP-SVS OF AIDE,EA 15 MIN: HCPCS

## 2020-08-17 PROCEDURE — 0651 HSPC ROUTINE HOME CARE

## 2020-08-17 PROCEDURE — G0299 HHS/HOSPICE OF RN EA 15 MIN: HCPCS

## 2020-08-18 VITALS
TEMPERATURE: 98.3 F | HEART RATE: 66 BPM | RESPIRATION RATE: 18 BRPM | DIASTOLIC BLOOD PRESSURE: 67 MMHG | SYSTOLIC BLOOD PRESSURE: 137 MMHG

## 2020-08-18 PROCEDURE — 0651 HSPC ROUTINE HOME CARE

## 2020-08-19 ENCOUNTER — HOME CARE VISIT (OUTPATIENT)
Dept: SCHEDULING | Facility: HOME HEALTH | Age: 85
End: 2020-08-19
Payer: MEDICARE

## 2020-08-19 PROCEDURE — 0651 HSPC ROUTINE HOME CARE

## 2020-08-19 PROCEDURE — G0156 HHCP-SVS OF AIDE,EA 15 MIN: HCPCS

## 2020-08-20 PROCEDURE — 0651 HSPC ROUTINE HOME CARE

## 2020-08-21 ENCOUNTER — HOME CARE VISIT (OUTPATIENT)
Dept: SCHEDULING | Facility: HOME HEALTH | Age: 85
End: 2020-08-21
Payer: MEDICARE

## 2020-08-21 PROCEDURE — G0156 HHCP-SVS OF AIDE,EA 15 MIN: HCPCS

## 2020-08-21 PROCEDURE — 0651 HSPC ROUTINE HOME CARE

## 2020-08-22 PROCEDURE — 0651 HSPC ROUTINE HOME CARE

## 2020-08-23 PROCEDURE — 0651 HSPC ROUTINE HOME CARE

## 2020-08-24 ENCOUNTER — HOME CARE VISIT (OUTPATIENT)
Dept: SCHEDULING | Facility: HOME HEALTH | Age: 85
End: 2020-08-24
Payer: MEDICARE

## 2020-08-24 ENCOUNTER — HOME CARE VISIT (OUTPATIENT)
Dept: HOSPICE | Facility: HOSPICE | Age: 85
End: 2020-08-24
Payer: MEDICARE

## 2020-08-24 VITALS
TEMPERATURE: 98.4 F | HEART RATE: 54 BPM | SYSTOLIC BLOOD PRESSURE: 123 MMHG | OXYGEN SATURATION: 96 % | DIASTOLIC BLOOD PRESSURE: 54 MMHG | RESPIRATION RATE: 20 BRPM

## 2020-08-24 PROCEDURE — G0155 HHCP-SVS OF CSW,EA 15 MIN: HCPCS

## 2020-08-24 PROCEDURE — 0651 HSPC ROUTINE HOME CARE

## 2020-08-24 PROCEDURE — A6250 SKIN SEAL PROTECT MOISTURIZR: HCPCS

## 2020-08-24 PROCEDURE — T4523 ADULT SIZE BRIEF/DIAPER LG: HCPCS

## 2020-08-24 PROCEDURE — G0156 HHCP-SVS OF AIDE,EA 15 MIN: HCPCS

## 2020-08-24 PROCEDURE — T4541 LARGE DISPOSABLE UNDERPAD: HCPCS

## 2020-08-24 PROCEDURE — G0299 HHS/HOSPICE OF RN EA 15 MIN: HCPCS

## 2020-08-25 PROCEDURE — 0651 HSPC ROUTINE HOME CARE

## 2020-08-26 ENCOUNTER — HOME CARE VISIT (OUTPATIENT)
Dept: SCHEDULING | Facility: HOME HEALTH | Age: 85
End: 2020-08-26
Payer: MEDICARE

## 2020-08-26 PROCEDURE — G0156 HHCP-SVS OF AIDE,EA 15 MIN: HCPCS

## 2020-08-26 PROCEDURE — 0651 HSPC ROUTINE HOME CARE

## 2020-08-27 PROCEDURE — 0651 HSPC ROUTINE HOME CARE

## 2020-08-28 ENCOUNTER — HOME CARE VISIT (OUTPATIENT)
Dept: SCHEDULING | Facility: HOME HEALTH | Age: 85
End: 2020-08-28
Payer: MEDICARE

## 2020-08-28 PROCEDURE — G0156 HHCP-SVS OF AIDE,EA 15 MIN: HCPCS

## 2020-08-28 PROCEDURE — 0651 HSPC ROUTINE HOME CARE

## 2020-08-29 PROCEDURE — 0651 HSPC ROUTINE HOME CARE

## 2020-08-30 PROCEDURE — 0651 HSPC ROUTINE HOME CARE

## 2020-08-31 ENCOUNTER — HOME CARE VISIT (OUTPATIENT)
Dept: SCHEDULING | Facility: HOME HEALTH | Age: 85
End: 2020-08-31
Payer: MEDICARE

## 2020-08-31 VITALS
RESPIRATION RATE: 18 BRPM | DIASTOLIC BLOOD PRESSURE: 86 MMHG | HEART RATE: 72 BPM | TEMPERATURE: 97.2 F | SYSTOLIC BLOOD PRESSURE: 149 MMHG | OXYGEN SATURATION: 99 %

## 2020-08-31 PROCEDURE — G0299 HHS/HOSPICE OF RN EA 15 MIN: HCPCS

## 2020-08-31 PROCEDURE — G0156 HHCP-SVS OF AIDE,EA 15 MIN: HCPCS

## 2020-08-31 PROCEDURE — 0651 HSPC ROUTINE HOME CARE

## 2020-09-01 PROCEDURE — 0651 HSPC ROUTINE HOME CARE

## 2020-09-02 ENCOUNTER — HOME CARE VISIT (OUTPATIENT)
Dept: SCHEDULING | Facility: HOME HEALTH | Age: 85
End: 2020-09-02
Payer: MEDICARE

## 2020-09-02 PROCEDURE — G0156 HHCP-SVS OF AIDE,EA 15 MIN: HCPCS

## 2020-09-02 PROCEDURE — 0651 HSPC ROUTINE HOME CARE

## 2020-09-03 PROCEDURE — 0651 HSPC ROUTINE HOME CARE

## 2020-09-04 ENCOUNTER — HOME CARE VISIT (OUTPATIENT)
Dept: SCHEDULING | Facility: HOME HEALTH | Age: 85
End: 2020-09-04
Payer: MEDICARE

## 2020-09-04 PROCEDURE — G0156 HHCP-SVS OF AIDE,EA 15 MIN: HCPCS

## 2020-09-04 PROCEDURE — 0651 HSPC ROUTINE HOME CARE

## 2020-09-05 PROCEDURE — 0651 HSPC ROUTINE HOME CARE

## 2020-09-06 PROCEDURE — 0651 HSPC ROUTINE HOME CARE

## 2020-09-07 ENCOUNTER — HOME CARE VISIT (OUTPATIENT)
Dept: SCHEDULING | Facility: HOME HEALTH | Age: 85
End: 2020-09-07
Payer: MEDICARE

## 2020-09-07 VITALS
HEART RATE: 60 BPM | TEMPERATURE: 97.3 F | DIASTOLIC BLOOD PRESSURE: 68 MMHG | RESPIRATION RATE: 18 BRPM | SYSTOLIC BLOOD PRESSURE: 140 MMHG

## 2020-09-07 PROCEDURE — 0651 HSPC ROUTINE HOME CARE

## 2020-09-07 PROCEDURE — G0299 HHS/HOSPICE OF RN EA 15 MIN: HCPCS

## 2020-09-08 PROCEDURE — 0651 HSPC ROUTINE HOME CARE

## 2020-09-09 ENCOUNTER — HOME CARE VISIT (OUTPATIENT)
Dept: SCHEDULING | Facility: HOME HEALTH | Age: 85
End: 2020-09-09
Payer: MEDICARE

## 2020-09-09 PROCEDURE — 0651 HSPC ROUTINE HOME CARE

## 2020-09-09 PROCEDURE — G0156 HHCP-SVS OF AIDE,EA 15 MIN: HCPCS

## 2020-09-10 ENCOUNTER — HOME CARE VISIT (OUTPATIENT)
Dept: HOSPICE | Facility: HOSPICE | Age: 85
End: 2020-09-10
Payer: MEDICARE

## 2020-09-10 PROCEDURE — 0651 HSPC ROUTINE HOME CARE

## 2020-09-11 ENCOUNTER — HOME CARE VISIT (OUTPATIENT)
Dept: SCHEDULING | Facility: HOME HEALTH | Age: 85
End: 2020-09-11
Payer: MEDICARE

## 2020-09-11 PROCEDURE — 0651 HSPC ROUTINE HOME CARE

## 2020-09-11 PROCEDURE — G0156 HHCP-SVS OF AIDE,EA 15 MIN: HCPCS

## 2020-09-12 PROCEDURE — 0651 HSPC ROUTINE HOME CARE

## 2020-09-13 PROCEDURE — 0651 HSPC ROUTINE HOME CARE

## 2020-09-14 ENCOUNTER — HOME CARE VISIT (OUTPATIENT)
Dept: SCHEDULING | Facility: HOME HEALTH | Age: 85
End: 2020-09-14
Payer: MEDICARE

## 2020-09-14 PROCEDURE — T4541 LARGE DISPOSABLE UNDERPAD: HCPCS

## 2020-09-14 PROCEDURE — G0156 HHCP-SVS OF AIDE,EA 15 MIN: HCPCS

## 2020-09-14 PROCEDURE — 0651 HSPC ROUTINE HOME CARE

## 2020-09-14 PROCEDURE — G0299 HHS/HOSPICE OF RN EA 15 MIN: HCPCS

## 2020-09-14 PROCEDURE — T4522 ADULT SIZE BRIEF/DIAPER MED: HCPCS

## 2020-09-14 PROCEDURE — A6250 SKIN SEAL PROTECT MOISTURIZR: HCPCS

## 2020-09-15 VITALS
RESPIRATION RATE: 18 BRPM | SYSTOLIC BLOOD PRESSURE: 143 MMHG | DIASTOLIC BLOOD PRESSURE: 62 MMHG | TEMPERATURE: 98.2 F | HEART RATE: 60 BPM

## 2020-09-15 PROCEDURE — HOSPICE MEDICATION HC HH HOSPICE MEDICATION

## 2020-09-15 PROCEDURE — 0651 HSPC ROUTINE HOME CARE

## 2020-09-16 ENCOUNTER — HOME CARE VISIT (OUTPATIENT)
Dept: SCHEDULING | Facility: HOME HEALTH | Age: 85
End: 2020-09-16
Payer: MEDICARE

## 2020-09-16 PROCEDURE — T4523 ADULT SIZE BRIEF/DIAPER LG: HCPCS

## 2020-09-16 PROCEDURE — G0156 HHCP-SVS OF AIDE,EA 15 MIN: HCPCS

## 2020-09-16 PROCEDURE — 0651 HSPC ROUTINE HOME CARE

## 2020-09-17 PROCEDURE — 0651 HSPC ROUTINE HOME CARE

## 2020-09-18 ENCOUNTER — HOME CARE VISIT (OUTPATIENT)
Dept: SCHEDULING | Facility: HOME HEALTH | Age: 85
End: 2020-09-18
Payer: MEDICARE

## 2020-09-18 PROCEDURE — 0651 HSPC ROUTINE HOME CARE

## 2020-09-18 PROCEDURE — G0156 HHCP-SVS OF AIDE,EA 15 MIN: HCPCS

## 2020-09-19 PROCEDURE — 0651 HSPC ROUTINE HOME CARE

## 2020-09-20 PROCEDURE — 0651 HSPC ROUTINE HOME CARE

## 2020-09-21 ENCOUNTER — HOME CARE VISIT (OUTPATIENT)
Dept: HOSPICE | Facility: HOSPICE | Age: 85
End: 2020-09-21
Payer: MEDICARE

## 2020-09-21 ENCOUNTER — HOME CARE VISIT (OUTPATIENT)
Dept: SCHEDULING | Facility: HOME HEALTH | Age: 85
End: 2020-09-21
Payer: MEDICARE

## 2020-09-21 PROCEDURE — G0155 HHCP-SVS OF CSW,EA 15 MIN: HCPCS

## 2020-09-21 PROCEDURE — T4523 ADULT SIZE BRIEF/DIAPER LG: HCPCS

## 2020-09-21 PROCEDURE — T4541 LARGE DISPOSABLE UNDERPAD: HCPCS

## 2020-09-21 PROCEDURE — 0651 HSPC ROUTINE HOME CARE

## 2020-09-21 PROCEDURE — G0156 HHCP-SVS OF AIDE,EA 15 MIN: HCPCS

## 2020-09-21 PROCEDURE — A6250 SKIN SEAL PROTECT MOISTURIZR: HCPCS

## 2020-09-21 PROCEDURE — G0299 HHS/HOSPICE OF RN EA 15 MIN: HCPCS

## 2020-09-22 VITALS
DIASTOLIC BLOOD PRESSURE: 69 MMHG | SYSTOLIC BLOOD PRESSURE: 134 MMHG | HEART RATE: 72 BPM | RESPIRATION RATE: 18 BRPM | TEMPERATURE: 97.9 F

## 2020-09-22 PROCEDURE — 0651 HSPC ROUTINE HOME CARE

## 2020-09-23 ENCOUNTER — HOME CARE VISIT (OUTPATIENT)
Dept: SCHEDULING | Facility: HOME HEALTH | Age: 85
End: 2020-09-23
Payer: MEDICARE

## 2020-09-23 PROCEDURE — 0651 HSPC ROUTINE HOME CARE

## 2020-09-23 PROCEDURE — G0156 HHCP-SVS OF AIDE,EA 15 MIN: HCPCS

## 2020-09-24 PROCEDURE — 0651 HSPC ROUTINE HOME CARE

## 2020-09-25 ENCOUNTER — HOME CARE VISIT (OUTPATIENT)
Dept: SCHEDULING | Facility: HOME HEALTH | Age: 85
End: 2020-09-25
Payer: MEDICARE

## 2020-09-25 PROCEDURE — G0156 HHCP-SVS OF AIDE,EA 15 MIN: HCPCS

## 2020-09-25 PROCEDURE — 0651 HSPC ROUTINE HOME CARE

## 2020-09-26 PROCEDURE — 0651 HSPC ROUTINE HOME CARE

## 2020-09-27 PROCEDURE — 0651 HSPC ROUTINE HOME CARE

## 2020-09-28 ENCOUNTER — HOME CARE VISIT (OUTPATIENT)
Dept: SCHEDULING | Facility: HOME HEALTH | Age: 85
End: 2020-09-28
Payer: MEDICARE

## 2020-09-28 PROCEDURE — G0156 HHCP-SVS OF AIDE,EA 15 MIN: HCPCS

## 2020-09-28 PROCEDURE — 0651 HSPC ROUTINE HOME CARE

## 2020-09-28 PROCEDURE — G0299 HHS/HOSPICE OF RN EA 15 MIN: HCPCS

## 2020-09-29 VITALS
SYSTOLIC BLOOD PRESSURE: 140 MMHG | OXYGEN SATURATION: 97 % | TEMPERATURE: 97.6 F | HEART RATE: 72 BPM | DIASTOLIC BLOOD PRESSURE: 69 MMHG | RESPIRATION RATE: 18 BRPM

## 2020-09-29 PROCEDURE — T4541 LARGE DISPOSABLE UNDERPAD: HCPCS

## 2020-09-29 PROCEDURE — 0651 HSPC ROUTINE HOME CARE

## 2020-09-29 PROCEDURE — T4523 ADULT SIZE BRIEF/DIAPER LG: HCPCS

## 2020-09-30 ENCOUNTER — HOME CARE VISIT (OUTPATIENT)
Dept: SCHEDULING | Facility: HOME HEALTH | Age: 85
End: 2020-09-30
Payer: MEDICARE

## 2020-09-30 PROCEDURE — G0156 HHCP-SVS OF AIDE,EA 15 MIN: HCPCS

## 2020-09-30 PROCEDURE — 0651 HSPC ROUTINE HOME CARE

## 2020-10-01 ENCOUNTER — HOME CARE VISIT (OUTPATIENT)
Dept: SCHEDULING | Facility: HOME HEALTH | Age: 85
End: 2020-10-01
Payer: MEDICARE

## 2020-10-01 VITALS
SYSTOLIC BLOOD PRESSURE: 147 MMHG | OXYGEN SATURATION: 99 % | DIASTOLIC BLOOD PRESSURE: 83 MMHG | HEART RATE: 64 BPM | RESPIRATION RATE: 18 BRPM | TEMPERATURE: 98.1 F

## 2020-10-01 PROCEDURE — 0651 HSPC ROUTINE HOME CARE

## 2020-10-01 PROCEDURE — G0299 HHS/HOSPICE OF RN EA 15 MIN: HCPCS

## 2020-10-02 ENCOUNTER — HOME CARE VISIT (OUTPATIENT)
Dept: SCHEDULING | Facility: HOME HEALTH | Age: 85
End: 2020-10-02
Payer: MEDICARE

## 2020-10-02 PROCEDURE — 0651 HSPC ROUTINE HOME CARE

## 2020-10-02 PROCEDURE — G0156 HHCP-SVS OF AIDE,EA 15 MIN: HCPCS

## 2020-10-03 PROCEDURE — 0651 HSPC ROUTINE HOME CARE

## 2020-10-04 ENCOUNTER — HOME CARE VISIT (OUTPATIENT)
Dept: HOSPICE | Facility: HOSPICE | Age: 85
End: 2020-10-04
Payer: MEDICARE

## 2020-10-04 PROCEDURE — 0651 HSPC ROUTINE HOME CARE

## 2020-10-05 ENCOUNTER — HOME CARE VISIT (OUTPATIENT)
Dept: SCHEDULING | Facility: HOME HEALTH | Age: 85
End: 2020-10-05
Payer: MEDICARE

## 2020-10-05 PROCEDURE — G0299 HHS/HOSPICE OF RN EA 15 MIN: HCPCS

## 2020-10-05 PROCEDURE — G0156 HHCP-SVS OF AIDE,EA 15 MIN: HCPCS

## 2020-10-05 PROCEDURE — A6250 SKIN SEAL PROTECT MOISTURIZR: HCPCS

## 2020-10-05 PROCEDURE — T4523 ADULT SIZE BRIEF/DIAPER LG: HCPCS

## 2020-10-05 PROCEDURE — 0651 HSPC ROUTINE HOME CARE

## 2020-10-05 PROCEDURE — T4541 LARGE DISPOSABLE UNDERPAD: HCPCS

## 2020-10-06 VITALS
DIASTOLIC BLOOD PRESSURE: 71 MMHG | TEMPERATURE: 97.8 F | RESPIRATION RATE: 18 BRPM | HEART RATE: 64 BPM | SYSTOLIC BLOOD PRESSURE: 150 MMHG | OXYGEN SATURATION: 97 %

## 2020-10-06 PROCEDURE — 0651 HSPC ROUTINE HOME CARE

## 2020-10-07 ENCOUNTER — HOME CARE VISIT (OUTPATIENT)
Dept: SCHEDULING | Facility: HOME HEALTH | Age: 85
End: 2020-10-07
Payer: MEDICARE

## 2020-10-07 PROCEDURE — G0156 HHCP-SVS OF AIDE,EA 15 MIN: HCPCS

## 2020-10-07 PROCEDURE — 0651 HSPC ROUTINE HOME CARE

## 2020-10-08 PROCEDURE — 0651 HSPC ROUTINE HOME CARE

## 2020-10-09 ENCOUNTER — HOME CARE VISIT (OUTPATIENT)
Dept: HOSPICE | Facility: HOSPICE | Age: 85
End: 2020-10-09
Payer: MEDICARE

## 2020-10-09 ENCOUNTER — HOME CARE VISIT (OUTPATIENT)
Dept: SCHEDULING | Facility: HOME HEALTH | Age: 85
End: 2020-10-09
Payer: MEDICARE

## 2020-10-09 PROCEDURE — G0156 HHCP-SVS OF AIDE,EA 15 MIN: HCPCS

## 2020-10-09 PROCEDURE — 0651 HSPC ROUTINE HOME CARE

## 2020-10-10 PROCEDURE — 0651 HSPC ROUTINE HOME CARE

## 2020-10-11 PROCEDURE — 0651 HSPC ROUTINE HOME CARE

## 2020-10-12 ENCOUNTER — HOME CARE VISIT (OUTPATIENT)
Dept: SCHEDULING | Facility: HOME HEALTH | Age: 85
End: 2020-10-12
Payer: MEDICARE

## 2020-10-12 PROCEDURE — G0156 HHCP-SVS OF AIDE,EA 15 MIN: HCPCS

## 2020-10-12 PROCEDURE — 0651 HSPC ROUTINE HOME CARE

## 2020-10-13 PROCEDURE — 0651 HSPC ROUTINE HOME CARE

## 2020-10-14 ENCOUNTER — HOME CARE VISIT (OUTPATIENT)
Dept: SCHEDULING | Facility: HOME HEALTH | Age: 85
End: 2020-10-14
Payer: MEDICARE

## 2020-10-14 VITALS
TEMPERATURE: 97.6 F | SYSTOLIC BLOOD PRESSURE: 134 MMHG | RESPIRATION RATE: 18 BRPM | HEART RATE: 63 BPM | DIASTOLIC BLOOD PRESSURE: 60 MMHG | OXYGEN SATURATION: 96 %

## 2020-10-14 PROCEDURE — 0651 HSPC ROUTINE HOME CARE

## 2020-10-14 PROCEDURE — T4541 LARGE DISPOSABLE UNDERPAD: HCPCS

## 2020-10-14 PROCEDURE — G0299 HHS/HOSPICE OF RN EA 15 MIN: HCPCS

## 2020-10-14 PROCEDURE — T4523 ADULT SIZE BRIEF/DIAPER LG: HCPCS

## 2020-10-14 PROCEDURE — G0156 HHCP-SVS OF AIDE,EA 15 MIN: HCPCS

## 2020-10-14 PROCEDURE — A6250 SKIN SEAL PROTECT MOISTURIZR: HCPCS

## 2020-10-15 PROCEDURE — 0651 HSPC ROUTINE HOME CARE

## 2020-10-16 ENCOUNTER — HOME CARE VISIT (OUTPATIENT)
Dept: SCHEDULING | Facility: HOME HEALTH | Age: 85
End: 2020-10-16
Payer: MEDICARE

## 2020-10-16 PROCEDURE — 0651 HSPC ROUTINE HOME CARE

## 2020-10-16 PROCEDURE — G0156 HHCP-SVS OF AIDE,EA 15 MIN: HCPCS

## 2020-10-17 PROCEDURE — 0651 HSPC ROUTINE HOME CARE

## 2020-10-18 PROCEDURE — 0651 HSPC ROUTINE HOME CARE

## 2020-10-19 ENCOUNTER — HOME CARE VISIT (OUTPATIENT)
Dept: SCHEDULING | Facility: HOME HEALTH | Age: 85
End: 2020-10-19
Payer: MEDICARE

## 2020-10-19 VITALS
DIASTOLIC BLOOD PRESSURE: 54 MMHG | SYSTOLIC BLOOD PRESSURE: 125 MMHG | OXYGEN SATURATION: 96 % | TEMPERATURE: 98.4 F | RESPIRATION RATE: 18 BRPM | HEART RATE: 64 BPM

## 2020-10-19 PROCEDURE — G0299 HHS/HOSPICE OF RN EA 15 MIN: HCPCS

## 2020-10-19 PROCEDURE — A6250 SKIN SEAL PROTECT MOISTURIZR: HCPCS

## 2020-10-19 PROCEDURE — T4541 LARGE DISPOSABLE UNDERPAD: HCPCS

## 2020-10-19 PROCEDURE — T4523 ADULT SIZE BRIEF/DIAPER LG: HCPCS

## 2020-10-19 PROCEDURE — G0156 HHCP-SVS OF AIDE,EA 15 MIN: HCPCS

## 2020-10-19 PROCEDURE — 0651 HSPC ROUTINE HOME CARE

## 2020-10-20 PROCEDURE — 0651 HSPC ROUTINE HOME CARE

## 2020-10-21 ENCOUNTER — HOME CARE VISIT (OUTPATIENT)
Dept: SCHEDULING | Facility: HOME HEALTH | Age: 85
End: 2020-10-21
Payer: MEDICARE

## 2020-10-21 PROCEDURE — G0156 HHCP-SVS OF AIDE,EA 15 MIN: HCPCS

## 2020-10-21 PROCEDURE — 0651 HSPC ROUTINE HOME CARE

## 2020-10-22 PROCEDURE — 0651 HSPC ROUTINE HOME CARE

## 2020-10-23 ENCOUNTER — HOME CARE VISIT (OUTPATIENT)
Dept: SCHEDULING | Facility: HOME HEALTH | Age: 85
End: 2020-10-23
Payer: MEDICARE

## 2020-10-23 PROCEDURE — G0156 HHCP-SVS OF AIDE,EA 15 MIN: HCPCS

## 2020-10-23 PROCEDURE — 0651 HSPC ROUTINE HOME CARE

## 2020-10-24 PROCEDURE — 0651 HSPC ROUTINE HOME CARE

## 2020-10-25 PROCEDURE — 0651 HSPC ROUTINE HOME CARE

## 2020-10-26 ENCOUNTER — HOME CARE VISIT (OUTPATIENT)
Dept: HOSPICE | Facility: HOSPICE | Age: 85
End: 2020-10-26
Payer: MEDICARE

## 2020-10-26 ENCOUNTER — HOME CARE VISIT (OUTPATIENT)
Dept: SCHEDULING | Facility: HOME HEALTH | Age: 85
End: 2020-10-26
Payer: MEDICARE

## 2020-10-26 VITALS
RESPIRATION RATE: 16 BRPM | SYSTOLIC BLOOD PRESSURE: 127 MMHG | TEMPERATURE: 98.1 F | OXYGEN SATURATION: 98 % | DIASTOLIC BLOOD PRESSURE: 61 MMHG | HEART RATE: 64 BPM

## 2020-10-26 PROCEDURE — 0651 HSPC ROUTINE HOME CARE

## 2020-10-26 PROCEDURE — T4523 ADULT SIZE BRIEF/DIAPER LG: HCPCS

## 2020-10-26 PROCEDURE — G0156 HHCP-SVS OF AIDE,EA 15 MIN: HCPCS

## 2020-10-26 PROCEDURE — G0155 HHCP-SVS OF CSW,EA 15 MIN: HCPCS

## 2020-10-26 PROCEDURE — G0299 HHS/HOSPICE OF RN EA 15 MIN: HCPCS

## 2020-10-26 PROCEDURE — T4541 LARGE DISPOSABLE UNDERPAD: HCPCS

## 2020-10-27 PROCEDURE — 0651 HSPC ROUTINE HOME CARE

## 2020-10-28 ENCOUNTER — HOME CARE VISIT (OUTPATIENT)
Dept: SCHEDULING | Facility: HOME HEALTH | Age: 85
End: 2020-10-28
Payer: MEDICARE

## 2020-10-28 PROCEDURE — 0651 HSPC ROUTINE HOME CARE

## 2020-10-28 PROCEDURE — G0156 HHCP-SVS OF AIDE,EA 15 MIN: HCPCS

## 2020-10-29 ENCOUNTER — HOME CARE VISIT (OUTPATIENT)
Dept: SCHEDULING | Facility: HOME HEALTH | Age: 85
End: 2020-10-29
Payer: MEDICARE

## 2020-10-29 PROCEDURE — 0651 HSPC ROUTINE HOME CARE

## 2020-10-30 ENCOUNTER — HOME CARE VISIT (OUTPATIENT)
Dept: SCHEDULING | Facility: HOME HEALTH | Age: 85
End: 2020-10-30
Payer: MEDICARE

## 2020-10-30 PROCEDURE — 0651 HSPC ROUTINE HOME CARE

## 2020-10-30 PROCEDURE — G0156 HHCP-SVS OF AIDE,EA 15 MIN: HCPCS

## 2020-10-31 PROCEDURE — 0651 HSPC ROUTINE HOME CARE

## 2020-11-01 PROCEDURE — 0651 HSPC ROUTINE HOME CARE

## 2020-11-02 ENCOUNTER — HOME CARE VISIT (OUTPATIENT)
Dept: SCHEDULING | Facility: HOME HEALTH | Age: 85
End: 2020-11-02
Payer: MEDICARE

## 2020-11-02 VITALS
TEMPERATURE: 97.6 F | HEART RATE: 58 BPM | DIASTOLIC BLOOD PRESSURE: 73 MMHG | OXYGEN SATURATION: 88 % | RESPIRATION RATE: 16 BRPM | SYSTOLIC BLOOD PRESSURE: 92 MMHG

## 2020-11-02 PROCEDURE — T4541 LARGE DISPOSABLE UNDERPAD: HCPCS

## 2020-11-02 PROCEDURE — T4523 ADULT SIZE BRIEF/DIAPER LG: HCPCS

## 2020-11-02 PROCEDURE — A6250 SKIN SEAL PROTECT MOISTURIZR: HCPCS

## 2020-11-02 PROCEDURE — G0299 HHS/HOSPICE OF RN EA 15 MIN: HCPCS

## 2020-11-02 PROCEDURE — 0651 HSPC ROUTINE HOME CARE

## 2020-11-02 PROCEDURE — G0156 HHCP-SVS OF AIDE,EA 15 MIN: HCPCS

## 2020-11-03 ENCOUNTER — HOME CARE VISIT (OUTPATIENT)
Dept: HOSPICE | Facility: HOSPICE | Age: 85
End: 2020-11-03
Payer: MEDICARE

## 2020-11-03 PROCEDURE — 0651 HSPC ROUTINE HOME CARE

## 2020-11-04 ENCOUNTER — HOME CARE VISIT (OUTPATIENT)
Dept: SCHEDULING | Facility: HOME HEALTH | Age: 85
End: 2020-11-04
Payer: MEDICARE

## 2020-11-04 PROCEDURE — 0651 HSPC ROUTINE HOME CARE

## 2020-11-04 PROCEDURE — G0156 HHCP-SVS OF AIDE,EA 15 MIN: HCPCS

## 2020-11-05 PROCEDURE — 0651 HSPC ROUTINE HOME CARE

## 2020-11-06 ENCOUNTER — HOME CARE VISIT (OUTPATIENT)
Dept: SCHEDULING | Facility: HOME HEALTH | Age: 85
End: 2020-11-06
Payer: MEDICARE

## 2020-11-06 VITALS
HEART RATE: 69 BPM | TEMPERATURE: 96.8 F | DIASTOLIC BLOOD PRESSURE: 81 MMHG | SYSTOLIC BLOOD PRESSURE: 131 MMHG | RESPIRATION RATE: 16 BRPM

## 2020-11-06 PROCEDURE — G0299 HHS/HOSPICE OF RN EA 15 MIN: HCPCS

## 2020-11-06 PROCEDURE — G0156 HHCP-SVS OF AIDE,EA 15 MIN: HCPCS

## 2020-11-06 PROCEDURE — 0651 HSPC ROUTINE HOME CARE

## 2020-11-07 PROCEDURE — 0651 HSPC ROUTINE HOME CARE

## 2020-11-08 PROCEDURE — 0651 HSPC ROUTINE HOME CARE

## 2020-11-09 ENCOUNTER — HOME CARE VISIT (OUTPATIENT)
Dept: SCHEDULING | Facility: HOME HEALTH | Age: 85
End: 2020-11-09
Payer: MEDICARE

## 2020-11-09 VITALS
OXYGEN SATURATION: 83 % | RESPIRATION RATE: 18 BRPM | HEART RATE: 74 BPM | TEMPERATURE: 98.6 F | DIASTOLIC BLOOD PRESSURE: 76 MMHG | SYSTOLIC BLOOD PRESSURE: 141 MMHG

## 2020-11-09 PROCEDURE — T4523 ADULT SIZE BRIEF/DIAPER LG: HCPCS

## 2020-11-09 PROCEDURE — G0299 HHS/HOSPICE OF RN EA 15 MIN: HCPCS

## 2020-11-09 PROCEDURE — T4541 LARGE DISPOSABLE UNDERPAD: HCPCS

## 2020-11-09 PROCEDURE — 0651 HSPC ROUTINE HOME CARE

## 2020-11-09 PROCEDURE — A6250 SKIN SEAL PROTECT MOISTURIZR: HCPCS

## 2020-11-09 PROCEDURE — G0156 HHCP-SVS OF AIDE,EA 15 MIN: HCPCS

## 2020-11-10 PROCEDURE — 0651 HSPC ROUTINE HOME CARE

## 2020-11-11 ENCOUNTER — HOME CARE VISIT (OUTPATIENT)
Dept: SCHEDULING | Facility: HOME HEALTH | Age: 85
End: 2020-11-11
Payer: MEDICARE

## 2020-11-11 PROCEDURE — 0651 HSPC ROUTINE HOME CARE

## 2020-11-11 PROCEDURE — G0156 HHCP-SVS OF AIDE,EA 15 MIN: HCPCS

## 2020-11-12 PROCEDURE — 0651 HSPC ROUTINE HOME CARE

## 2020-11-13 ENCOUNTER — HOME CARE VISIT (OUTPATIENT)
Dept: SCHEDULING | Facility: HOME HEALTH | Age: 85
End: 2020-11-13
Payer: MEDICARE

## 2020-11-13 PROCEDURE — 0651 HSPC ROUTINE HOME CARE

## 2020-11-13 PROCEDURE — G0156 HHCP-SVS OF AIDE,EA 15 MIN: HCPCS

## 2020-11-14 PROCEDURE — 0651 HSPC ROUTINE HOME CARE

## 2020-11-15 PROCEDURE — 0651 HSPC ROUTINE HOME CARE

## 2020-11-16 ENCOUNTER — HOME CARE VISIT (OUTPATIENT)
Dept: SCHEDULING | Facility: HOME HEALTH | Age: 85
End: 2020-11-16
Payer: MEDICARE

## 2020-11-16 ENCOUNTER — HOME CARE VISIT (OUTPATIENT)
Dept: HOSPICE | Facility: HOSPICE | Age: 85
End: 2020-11-16
Payer: MEDICARE

## 2020-11-16 VITALS
TEMPERATURE: 97.9 F | SYSTOLIC BLOOD PRESSURE: 165 MMHG | HEART RATE: 78 BPM | RESPIRATION RATE: 18 BRPM | DIASTOLIC BLOOD PRESSURE: 83 MMHG | OXYGEN SATURATION: 99 %

## 2020-11-16 PROCEDURE — G0299 HHS/HOSPICE OF RN EA 15 MIN: HCPCS

## 2020-11-16 PROCEDURE — 0651 HSPC ROUTINE HOME CARE

## 2020-11-17 PROCEDURE — 0651 HSPC ROUTINE HOME CARE

## 2020-11-18 ENCOUNTER — HOME CARE VISIT (OUTPATIENT)
Dept: SCHEDULING | Facility: HOME HEALTH | Age: 85
End: 2020-11-18
Payer: MEDICARE

## 2020-11-18 PROCEDURE — G0156 HHCP-SVS OF AIDE,EA 15 MIN: HCPCS

## 2020-11-18 PROCEDURE — 0651 HSPC ROUTINE HOME CARE

## 2020-11-19 PROCEDURE — 0651 HSPC ROUTINE HOME CARE

## 2020-11-20 ENCOUNTER — HOME CARE VISIT (OUTPATIENT)
Dept: SCHEDULING | Facility: HOME HEALTH | Age: 85
End: 2020-11-20
Payer: MEDICARE

## 2020-11-20 PROCEDURE — 0651 HSPC ROUTINE HOME CARE

## 2020-11-20 PROCEDURE — G0156 HHCP-SVS OF AIDE,EA 15 MIN: HCPCS

## 2020-11-21 PROCEDURE — 0651 HSPC ROUTINE HOME CARE

## 2020-11-22 PROCEDURE — 0651 HSPC ROUTINE HOME CARE

## 2020-11-23 ENCOUNTER — HOME CARE VISIT (OUTPATIENT)
Dept: SCHEDULING | Facility: HOME HEALTH | Age: 85
End: 2020-11-23
Payer: MEDICARE

## 2020-11-23 ENCOUNTER — HOME CARE VISIT (OUTPATIENT)
Dept: HOSPICE | Facility: HOSPICE | Age: 85
End: 2020-11-23
Payer: MEDICARE

## 2020-11-23 VITALS
SYSTOLIC BLOOD PRESSURE: 158 MMHG | TEMPERATURE: 96 F | OXYGEN SATURATION: 99 % | HEART RATE: 72 BPM | RESPIRATION RATE: 18 BRPM | DIASTOLIC BLOOD PRESSURE: 65 MMHG

## 2020-11-23 PROCEDURE — G0156 HHCP-SVS OF AIDE,EA 15 MIN: HCPCS

## 2020-11-23 PROCEDURE — G0299 HHS/HOSPICE OF RN EA 15 MIN: HCPCS

## 2020-11-23 PROCEDURE — T4523 ADULT SIZE BRIEF/DIAPER LG: HCPCS

## 2020-11-23 PROCEDURE — T4541 LARGE DISPOSABLE UNDERPAD: HCPCS

## 2020-11-23 PROCEDURE — G0155 HHCP-SVS OF CSW,EA 15 MIN: HCPCS

## 2020-11-23 PROCEDURE — 0651 HSPC ROUTINE HOME CARE

## 2020-11-24 PROCEDURE — 0651 HSPC ROUTINE HOME CARE

## 2020-11-25 ENCOUNTER — HOME CARE VISIT (OUTPATIENT)
Dept: SCHEDULING | Facility: HOME HEALTH | Age: 85
End: 2020-11-25
Payer: MEDICARE

## 2020-11-25 PROCEDURE — 0651 HSPC ROUTINE HOME CARE

## 2020-11-25 PROCEDURE — G0156 HHCP-SVS OF AIDE,EA 15 MIN: HCPCS

## 2020-11-26 PROCEDURE — 0651 HSPC ROUTINE HOME CARE

## 2020-11-27 ENCOUNTER — HOME CARE VISIT (OUTPATIENT)
Dept: SCHEDULING | Facility: HOME HEALTH | Age: 85
End: 2020-11-27
Payer: MEDICARE

## 2020-11-27 PROCEDURE — 0651 HSPC ROUTINE HOME CARE

## 2020-11-27 PROCEDURE — G0156 HHCP-SVS OF AIDE,EA 15 MIN: HCPCS

## 2020-11-28 PROCEDURE — 0651 HSPC ROUTINE HOME CARE

## 2020-11-29 PROCEDURE — 0651 HSPC ROUTINE HOME CARE

## 2020-11-30 ENCOUNTER — HOME CARE VISIT (OUTPATIENT)
Dept: SCHEDULING | Facility: HOME HEALTH | Age: 85
End: 2020-11-30
Payer: MEDICARE

## 2020-11-30 PROCEDURE — G0156 HHCP-SVS OF AIDE,EA 15 MIN: HCPCS

## 2020-11-30 PROCEDURE — 0651 HSPC ROUTINE HOME CARE

## 2020-12-01 ENCOUNTER — HOME CARE VISIT (OUTPATIENT)
Dept: SCHEDULING | Facility: HOME HEALTH | Age: 85
End: 2020-12-01
Payer: MEDICARE

## 2020-12-01 VITALS
HEART RATE: 64 BPM | OXYGEN SATURATION: 99 % | TEMPERATURE: 97.8 F | RESPIRATION RATE: 16 BRPM | SYSTOLIC BLOOD PRESSURE: 138 MMHG | DIASTOLIC BLOOD PRESSURE: 66 MMHG

## 2020-12-01 PROCEDURE — A4520 INCONTINENCE GARMENT ANYTYPE: HCPCS

## 2020-12-01 PROCEDURE — T4523 ADULT SIZE BRIEF/DIAPER LG: HCPCS

## 2020-12-01 PROCEDURE — A6250 SKIN SEAL PROTECT MOISTURIZR: HCPCS

## 2020-12-01 PROCEDURE — T4541 LARGE DISPOSABLE UNDERPAD: HCPCS

## 2020-12-01 PROCEDURE — G0155 HHCP-SVS OF CSW,EA 15 MIN: HCPCS

## 2020-12-01 PROCEDURE — A9286 ANY HYGIENIC ITEM, DEVICE: HCPCS

## 2020-12-01 PROCEDURE — G0299 HHS/HOSPICE OF RN EA 15 MIN: HCPCS

## 2020-12-01 PROCEDURE — 0651 HSPC ROUTINE HOME CARE

## 2020-12-02 ENCOUNTER — HOME CARE VISIT (OUTPATIENT)
Dept: SCHEDULING | Facility: HOME HEALTH | Age: 85
End: 2020-12-02
Payer: MEDICARE

## 2020-12-02 PROCEDURE — 0651 HSPC ROUTINE HOME CARE

## 2020-12-02 PROCEDURE — G0156 HHCP-SVS OF AIDE,EA 15 MIN: HCPCS

## 2020-12-03 ENCOUNTER — HOME CARE VISIT (OUTPATIENT)
Dept: HOSPICE | Facility: HOSPICE | Age: 85
End: 2020-12-03
Payer: MEDICARE

## 2020-12-03 PROCEDURE — 0651 HSPC ROUTINE HOME CARE

## 2020-12-04 ENCOUNTER — HOME CARE VISIT (OUTPATIENT)
Dept: SCHEDULING | Facility: HOME HEALTH | Age: 85
End: 2020-12-04
Payer: MEDICARE

## 2020-12-04 PROCEDURE — G0156 HHCP-SVS OF AIDE,EA 15 MIN: HCPCS

## 2020-12-04 PROCEDURE — 0651 HSPC ROUTINE HOME CARE

## 2020-12-05 PROCEDURE — 0651 HSPC ROUTINE HOME CARE

## 2020-12-06 PROCEDURE — 0651 HSPC ROUTINE HOME CARE

## 2020-12-07 ENCOUNTER — HOME CARE VISIT (OUTPATIENT)
Dept: SCHEDULING | Facility: HOME HEALTH | Age: 85
End: 2020-12-07
Payer: MEDICARE

## 2020-12-07 VITALS
SYSTOLIC BLOOD PRESSURE: 147 MMHG | HEART RATE: 61 BPM | TEMPERATURE: 98 F | DIASTOLIC BLOOD PRESSURE: 64 MMHG | RESPIRATION RATE: 18 BRPM | OXYGEN SATURATION: 95 %

## 2020-12-07 PROCEDURE — 0651 HSPC ROUTINE HOME CARE

## 2020-12-07 PROCEDURE — G0156 HHCP-SVS OF AIDE,EA 15 MIN: HCPCS

## 2020-12-07 PROCEDURE — G0299 HHS/HOSPICE OF RN EA 15 MIN: HCPCS

## 2020-12-08 ENCOUNTER — HOME CARE VISIT (OUTPATIENT)
Dept: HOSPICE | Facility: HOSPICE | Age: 85
End: 2020-12-08
Payer: MEDICARE

## 2020-12-08 PROCEDURE — T4541 LARGE DISPOSABLE UNDERPAD: HCPCS

## 2020-12-08 PROCEDURE — T4523 ADULT SIZE BRIEF/DIAPER LG: HCPCS

## 2020-12-08 PROCEDURE — 0651 HSPC ROUTINE HOME CARE

## 2020-12-09 ENCOUNTER — HOME CARE VISIT (OUTPATIENT)
Dept: SCHEDULING | Facility: HOME HEALTH | Age: 85
End: 2020-12-09
Payer: MEDICARE

## 2020-12-09 PROCEDURE — G0156 HHCP-SVS OF AIDE,EA 15 MIN: HCPCS

## 2020-12-09 PROCEDURE — 0651 HSPC ROUTINE HOME CARE

## 2020-12-10 PROCEDURE — 0651 HSPC ROUTINE HOME CARE

## 2020-12-11 ENCOUNTER — HOME CARE VISIT (OUTPATIENT)
Dept: SCHEDULING | Facility: HOME HEALTH | Age: 85
End: 2020-12-11
Payer: MEDICARE

## 2020-12-11 PROCEDURE — G0156 HHCP-SVS OF AIDE,EA 15 MIN: HCPCS

## 2020-12-11 PROCEDURE — 0651 HSPC ROUTINE HOME CARE

## 2020-12-12 PROCEDURE — 0651 HSPC ROUTINE HOME CARE

## 2020-12-13 PROCEDURE — 0651 HSPC ROUTINE HOME CARE

## 2020-12-14 ENCOUNTER — HOME CARE VISIT (OUTPATIENT)
Dept: SCHEDULING | Facility: HOME HEALTH | Age: 85
End: 2020-12-14
Payer: MEDICARE

## 2020-12-14 VITALS
RESPIRATION RATE: 16 BRPM | DIASTOLIC BLOOD PRESSURE: 63 MMHG | HEART RATE: 61 BPM | OXYGEN SATURATION: 95 % | SYSTOLIC BLOOD PRESSURE: 130 MMHG | TEMPERATURE: 98.8 F

## 2020-12-14 PROCEDURE — G0299 HHS/HOSPICE OF RN EA 15 MIN: HCPCS

## 2020-12-14 PROCEDURE — A6250 SKIN SEAL PROTECT MOISTURIZR: HCPCS

## 2020-12-14 PROCEDURE — 0651 HSPC ROUTINE HOME CARE

## 2020-12-14 PROCEDURE — A4520 INCONTINENCE GARMENT ANYTYPE: HCPCS

## 2020-12-14 PROCEDURE — G0156 HHCP-SVS OF AIDE,EA 15 MIN: HCPCS

## 2020-12-14 PROCEDURE — T4541 LARGE DISPOSABLE UNDERPAD: HCPCS

## 2020-12-15 PROCEDURE — 0651 HSPC ROUTINE HOME CARE

## 2020-12-16 ENCOUNTER — HOME CARE VISIT (OUTPATIENT)
Dept: SCHEDULING | Facility: HOME HEALTH | Age: 85
End: 2020-12-16
Payer: MEDICARE

## 2020-12-16 PROCEDURE — G0156 HHCP-SVS OF AIDE,EA 15 MIN: HCPCS

## 2020-12-16 PROCEDURE — 0651 HSPC ROUTINE HOME CARE

## 2020-12-17 PROCEDURE — 0651 HSPC ROUTINE HOME CARE

## 2020-12-18 ENCOUNTER — HOME CARE VISIT (OUTPATIENT)
Dept: SCHEDULING | Facility: HOME HEALTH | Age: 85
End: 2020-12-18
Payer: MEDICARE

## 2020-12-18 PROCEDURE — G0156 HHCP-SVS OF AIDE,EA 15 MIN: HCPCS

## 2020-12-18 PROCEDURE — 0651 HSPC ROUTINE HOME CARE

## 2020-12-19 PROCEDURE — 0651 HSPC ROUTINE HOME CARE

## 2020-12-20 PROCEDURE — 0651 HSPC ROUTINE HOME CARE

## 2020-12-21 ENCOUNTER — HOME CARE VISIT (OUTPATIENT)
Dept: SCHEDULING | Facility: HOME HEALTH | Age: 85
End: 2020-12-21
Payer: MEDICARE

## 2020-12-21 PROCEDURE — 0651 HSPC ROUTINE HOME CARE

## 2020-12-21 PROCEDURE — G0156 HHCP-SVS OF AIDE,EA 15 MIN: HCPCS

## 2020-12-22 ENCOUNTER — HOME CARE VISIT (OUTPATIENT)
Dept: HOSPICE | Facility: HOSPICE | Age: 85
End: 2020-12-22
Payer: MEDICARE

## 2020-12-22 VITALS
DIASTOLIC BLOOD PRESSURE: 60 MMHG | SYSTOLIC BLOOD PRESSURE: 122 MMHG | OXYGEN SATURATION: 87 % | RESPIRATION RATE: 20 BRPM | TEMPERATURE: 97.9 F | HEART RATE: 70 BPM

## 2020-12-22 PROCEDURE — G0299 HHS/HOSPICE OF RN EA 15 MIN: HCPCS

## 2020-12-22 PROCEDURE — 0651 HSPC ROUTINE HOME CARE

## 2020-12-23 ENCOUNTER — HOME CARE VISIT (OUTPATIENT)
Dept: HOSPICE | Facility: HOSPICE | Age: 85
End: 2020-12-23
Payer: MEDICARE

## 2020-12-23 ENCOUNTER — HOME CARE VISIT (OUTPATIENT)
Dept: SCHEDULING | Facility: HOME HEALTH | Age: 85
End: 2020-12-23
Payer: MEDICARE

## 2020-12-23 PROCEDURE — 0651 HSPC ROUTINE HOME CARE

## 2020-12-23 PROCEDURE — G0156 HHCP-SVS OF AIDE,EA 15 MIN: HCPCS

## 2020-12-24 PROCEDURE — 0651 HSPC ROUTINE HOME CARE

## 2020-12-25 ENCOUNTER — HOME CARE VISIT (OUTPATIENT)
Dept: HOSPICE | Facility: HOSPICE | Age: 85
End: 2020-12-25
Payer: MEDICARE

## 2020-12-25 PROCEDURE — 0651 HSPC ROUTINE HOME CARE

## 2020-12-26 PROCEDURE — 0651 HSPC ROUTINE HOME CARE

## 2020-12-27 PROCEDURE — 0651 HSPC ROUTINE HOME CARE

## 2020-12-28 ENCOUNTER — HOME CARE VISIT (OUTPATIENT)
Dept: SCHEDULING | Facility: HOME HEALTH | Age: 85
End: 2020-12-28
Payer: MEDICARE

## 2020-12-28 VITALS
DIASTOLIC BLOOD PRESSURE: 70 MMHG | RESPIRATION RATE: 16 BRPM | TEMPERATURE: 97.7 F | SYSTOLIC BLOOD PRESSURE: 160 MMHG | HEART RATE: 56 BPM | OXYGEN SATURATION: 92 %

## 2020-12-28 PROCEDURE — T4541 LARGE DISPOSABLE UNDERPAD: HCPCS

## 2020-12-28 PROCEDURE — G0156 HHCP-SVS OF AIDE,EA 15 MIN: HCPCS

## 2020-12-28 PROCEDURE — G0299 HHS/HOSPICE OF RN EA 15 MIN: HCPCS

## 2020-12-28 PROCEDURE — T4523 ADULT SIZE BRIEF/DIAPER LG: HCPCS

## 2020-12-28 PROCEDURE — 0651 HSPC ROUTINE HOME CARE

## 2020-12-29 PROCEDURE — 0651 HSPC ROUTINE HOME CARE

## 2020-12-29 PROCEDURE — A6250 SKIN SEAL PROTECT MOISTURIZR: HCPCS

## 2020-12-30 ENCOUNTER — HOME CARE VISIT (OUTPATIENT)
Dept: HOSPICE | Facility: HOSPICE | Age: 85
End: 2020-12-30
Payer: MEDICARE

## 2020-12-30 PROCEDURE — 0651 HSPC ROUTINE HOME CARE

## 2020-12-31 PROCEDURE — 0651 HSPC ROUTINE HOME CARE

## 2021-01-01 ENCOUNTER — HOME CARE VISIT (OUTPATIENT)
Dept: HOSPICE | Facility: HOSPICE | Age: 86
End: 2021-01-01
Payer: MEDICARE

## 2021-01-01 PROCEDURE — 0651 HSPC ROUTINE HOME CARE

## 2021-01-02 PROCEDURE — 0651 HSPC ROUTINE HOME CARE

## 2021-01-03 PROCEDURE — 0651 HSPC ROUTINE HOME CARE

## 2021-01-04 ENCOUNTER — HOME CARE VISIT (OUTPATIENT)
Dept: HOSPICE | Facility: HOSPICE | Age: 86
End: 2021-01-04
Payer: MEDICARE

## 2021-01-04 ENCOUNTER — HOME CARE VISIT (OUTPATIENT)
Dept: SCHEDULING | Facility: HOME HEALTH | Age: 86
End: 2021-01-04
Payer: MEDICARE

## 2021-01-04 VITALS
RESPIRATION RATE: 20 BRPM | DIASTOLIC BLOOD PRESSURE: 60 MMHG | OXYGEN SATURATION: 98 % | HEART RATE: 64 BPM | SYSTOLIC BLOOD PRESSURE: 132 MMHG | TEMPERATURE: 98 F

## 2021-01-04 PROCEDURE — A6250 SKIN SEAL PROTECT MOISTURIZR: HCPCS

## 2021-01-04 PROCEDURE — T4541 LARGE DISPOSABLE UNDERPAD: HCPCS

## 2021-01-04 PROCEDURE — 0651 HSPC ROUTINE HOME CARE

## 2021-01-04 PROCEDURE — T4523 ADULT SIZE BRIEF/DIAPER LG: HCPCS

## 2021-01-04 PROCEDURE — G0299 HHS/HOSPICE OF RN EA 15 MIN: HCPCS

## 2021-01-04 NOTE — PROGRESS NOTES
Called pt, Pt's family member / CG stated pt had a bath yesterday and they did not want her to have another today.

## 2021-01-05 PROCEDURE — 0651 HSPC ROUTINE HOME CARE

## 2021-01-06 ENCOUNTER — HOME CARE VISIT (OUTPATIENT)
Dept: SCHEDULING | Facility: HOME HEALTH | Age: 86
End: 2021-01-06
Payer: MEDICARE

## 2021-01-06 PROCEDURE — G0156 HHCP-SVS OF AIDE,EA 15 MIN: HCPCS

## 2021-01-06 PROCEDURE — 0651 HSPC ROUTINE HOME CARE

## 2021-01-07 PROCEDURE — 0651 HSPC ROUTINE HOME CARE

## 2021-01-08 ENCOUNTER — HOME CARE VISIT (OUTPATIENT)
Dept: SCHEDULING | Facility: HOME HEALTH | Age: 86
End: 2021-01-08
Payer: MEDICARE

## 2021-01-08 PROCEDURE — G0156 HHCP-SVS OF AIDE,EA 15 MIN: HCPCS

## 2021-01-08 PROCEDURE — 0651 HSPC ROUTINE HOME CARE

## 2021-01-09 PROCEDURE — 0651 HSPC ROUTINE HOME CARE

## 2021-01-10 PROCEDURE — 0651 HSPC ROUTINE HOME CARE

## 2021-01-11 ENCOUNTER — HOME CARE VISIT (OUTPATIENT)
Dept: SCHEDULING | Facility: HOME HEALTH | Age: 86
End: 2021-01-11
Payer: MEDICARE

## 2021-01-11 VITALS
RESPIRATION RATE: 20 BRPM | HEART RATE: 68 BPM | TEMPERATURE: 97.7 F | OXYGEN SATURATION: 95 % | SYSTOLIC BLOOD PRESSURE: 134 MMHG | DIASTOLIC BLOOD PRESSURE: 62 MMHG

## 2021-01-11 PROCEDURE — G0299 HHS/HOSPICE OF RN EA 15 MIN: HCPCS

## 2021-01-11 PROCEDURE — T4541 LARGE DISPOSABLE UNDERPAD: HCPCS

## 2021-01-11 PROCEDURE — 0651 HSPC ROUTINE HOME CARE

## 2021-01-11 PROCEDURE — G0156 HHCP-SVS OF AIDE,EA 15 MIN: HCPCS

## 2021-01-11 PROCEDURE — T4523 ADULT SIZE BRIEF/DIAPER LG: HCPCS

## 2021-01-12 PROCEDURE — 0651 HSPC ROUTINE HOME CARE

## 2021-01-13 ENCOUNTER — HOME CARE VISIT (OUTPATIENT)
Dept: SCHEDULING | Facility: HOME HEALTH | Age: 86
End: 2021-01-13
Payer: MEDICARE

## 2021-01-13 PROCEDURE — G0156 HHCP-SVS OF AIDE,EA 15 MIN: HCPCS

## 2021-01-13 PROCEDURE — 0651 HSPC ROUTINE HOME CARE

## 2021-01-14 PROCEDURE — 0651 HSPC ROUTINE HOME CARE

## 2021-01-15 ENCOUNTER — HOME CARE VISIT (OUTPATIENT)
Dept: SCHEDULING | Facility: HOME HEALTH | Age: 86
End: 2021-01-15
Payer: MEDICARE

## 2021-01-15 PROCEDURE — G0156 HHCP-SVS OF AIDE,EA 15 MIN: HCPCS

## 2021-01-15 PROCEDURE — 0651 HSPC ROUTINE HOME CARE

## 2021-01-16 PROCEDURE — 0651 HSPC ROUTINE HOME CARE

## 2021-01-17 PROCEDURE — 0651 HSPC ROUTINE HOME CARE

## 2021-01-18 ENCOUNTER — HOME CARE VISIT (OUTPATIENT)
Dept: SCHEDULING | Facility: HOME HEALTH | Age: 86
End: 2021-01-18
Payer: MEDICARE

## 2021-01-18 PROCEDURE — A6250 SKIN SEAL PROTECT MOISTURIZR: HCPCS

## 2021-01-18 PROCEDURE — G0156 HHCP-SVS OF AIDE,EA 15 MIN: HCPCS

## 2021-01-18 PROCEDURE — G0299 HHS/HOSPICE OF RN EA 15 MIN: HCPCS

## 2021-01-18 PROCEDURE — 0651 HSPC ROUTINE HOME CARE

## 2021-01-19 ENCOUNTER — HOME CARE VISIT (OUTPATIENT)
Dept: HOSPICE | Facility: HOSPICE | Age: 86
End: 2021-01-19
Payer: MEDICARE

## 2021-01-19 VITALS
SYSTOLIC BLOOD PRESSURE: 141 MMHG | RESPIRATION RATE: 16 BRPM | DIASTOLIC BLOOD PRESSURE: 61 MMHG | TEMPERATURE: 97.7 F | HEART RATE: 64 BPM | OXYGEN SATURATION: 99 %

## 2021-01-19 PROCEDURE — 0651 HSPC ROUTINE HOME CARE

## 2021-01-19 PROCEDURE — G0155 HHCP-SVS OF CSW,EA 15 MIN: HCPCS

## 2021-01-20 ENCOUNTER — HOME CARE VISIT (OUTPATIENT)
Dept: SCHEDULING | Facility: HOME HEALTH | Age: 86
End: 2021-01-20
Payer: MEDICARE

## 2021-01-20 PROCEDURE — G0156 HHCP-SVS OF AIDE,EA 15 MIN: HCPCS

## 2021-01-20 PROCEDURE — 0651 HSPC ROUTINE HOME CARE

## 2021-01-21 PROCEDURE — 0651 HSPC ROUTINE HOME CARE

## 2021-01-22 ENCOUNTER — HOME CARE VISIT (OUTPATIENT)
Dept: SCHEDULING | Facility: HOME HEALTH | Age: 86
End: 2021-01-22
Payer: MEDICARE

## 2021-01-22 PROCEDURE — G0156 HHCP-SVS OF AIDE,EA 15 MIN: HCPCS

## 2021-01-22 PROCEDURE — 0651 HSPC ROUTINE HOME CARE

## 2021-01-23 PROCEDURE — 0651 HSPC ROUTINE HOME CARE

## 2021-01-24 PROCEDURE — 0651 HSPC ROUTINE HOME CARE

## 2021-01-25 ENCOUNTER — HOME CARE VISIT (OUTPATIENT)
Dept: SCHEDULING | Facility: HOME HEALTH | Age: 86
End: 2021-01-25
Payer: MEDICARE

## 2021-01-25 VITALS
RESPIRATION RATE: 18 BRPM | TEMPERATURE: 97.5 F | DIASTOLIC BLOOD PRESSURE: 84 MMHG | OXYGEN SATURATION: 99 % | HEART RATE: 59 BPM | SYSTOLIC BLOOD PRESSURE: 134 MMHG

## 2021-01-25 PROCEDURE — G0299 HHS/HOSPICE OF RN EA 15 MIN: HCPCS

## 2021-01-25 PROCEDURE — 0651 HSPC ROUTINE HOME CARE

## 2021-01-25 PROCEDURE — G0156 HHCP-SVS OF AIDE,EA 15 MIN: HCPCS

## 2021-01-26 PROCEDURE — 0651 HSPC ROUTINE HOME CARE

## 2021-01-27 ENCOUNTER — HOME CARE VISIT (OUTPATIENT)
Dept: SCHEDULING | Facility: HOME HEALTH | Age: 86
End: 2021-01-27
Payer: MEDICARE

## 2021-01-27 PROCEDURE — 0651 HSPC ROUTINE HOME CARE

## 2021-01-27 PROCEDURE — G0156 HHCP-SVS OF AIDE,EA 15 MIN: HCPCS

## 2021-01-28 PROCEDURE — 0651 HSPC ROUTINE HOME CARE

## 2021-01-29 ENCOUNTER — HOME CARE VISIT (OUTPATIENT)
Dept: SCHEDULING | Facility: HOME HEALTH | Age: 86
End: 2021-01-29
Payer: MEDICARE

## 2021-01-29 PROCEDURE — G0156 HHCP-SVS OF AIDE,EA 15 MIN: HCPCS

## 2021-01-29 PROCEDURE — 0651 HSPC ROUTINE HOME CARE

## 2021-01-30 PROCEDURE — 0651 HSPC ROUTINE HOME CARE

## 2021-01-31 PROCEDURE — 0651 HSPC ROUTINE HOME CARE

## 2021-02-01 ENCOUNTER — HOME CARE VISIT (OUTPATIENT)
Dept: HOSPICE | Facility: HOSPICE | Age: 86
End: 2021-02-01
Payer: MEDICARE

## 2021-02-01 ENCOUNTER — HOME CARE VISIT (OUTPATIENT)
Dept: SCHEDULING | Facility: HOME HEALTH | Age: 86
End: 2021-02-01
Payer: MEDICARE

## 2021-02-01 VITALS
TEMPERATURE: 96.8 F | RESPIRATION RATE: 18 BRPM | SYSTOLIC BLOOD PRESSURE: 107 MMHG | OXYGEN SATURATION: 97 % | DIASTOLIC BLOOD PRESSURE: 67 MMHG | HEART RATE: 60 BPM

## 2021-02-01 PROCEDURE — G0299 HHS/HOSPICE OF RN EA 15 MIN: HCPCS

## 2021-02-01 PROCEDURE — MED10117 BRIEF,CLOTHLIKE,FITEXTRA,MD,32-42

## 2021-02-01 PROCEDURE — G0156 HHCP-SVS OF AIDE,EA 15 MIN: HCPCS

## 2021-02-01 PROCEDURE — 0651 HSPC ROUTINE HOME CARE

## 2021-02-01 PROCEDURE — G0155 HHCP-SVS OF CSW,EA 15 MIN: HCPCS

## 2021-02-01 PROCEDURE — A6250 SKIN SEAL PROTECT MOISTURIZR: HCPCS

## 2021-02-02 ENCOUNTER — HOME CARE VISIT (OUTPATIENT)
Dept: HOSPICE | Facility: HOSPICE | Age: 86
End: 2021-02-02
Payer: MEDICARE

## 2021-02-02 PROCEDURE — 0651 HSPC ROUTINE HOME CARE

## 2021-02-03 ENCOUNTER — HOME CARE VISIT (OUTPATIENT)
Dept: SCHEDULING | Facility: HOME HEALTH | Age: 86
End: 2021-02-03
Payer: MEDICARE

## 2021-02-03 PROCEDURE — G0156 HHCP-SVS OF AIDE,EA 15 MIN: HCPCS

## 2021-02-03 PROCEDURE — 0651 HSPC ROUTINE HOME CARE

## 2021-02-04 PROCEDURE — 0651 HSPC ROUTINE HOME CARE

## 2021-02-05 ENCOUNTER — HOME CARE VISIT (OUTPATIENT)
Dept: HOSPICE | Facility: HOSPICE | Age: 86
End: 2021-02-05
Payer: MEDICARE

## 2021-02-05 ENCOUNTER — HOME CARE VISIT (OUTPATIENT)
Dept: SCHEDULING | Facility: HOME HEALTH | Age: 86
End: 2021-02-05
Payer: MEDICARE

## 2021-02-05 PROCEDURE — 0651 HSPC ROUTINE HOME CARE

## 2021-02-05 PROCEDURE — G0156 HHCP-SVS OF AIDE,EA 15 MIN: HCPCS

## 2021-02-06 PROCEDURE — 0651 HSPC ROUTINE HOME CARE

## 2021-02-07 PROCEDURE — 0651 HSPC ROUTINE HOME CARE

## 2021-02-08 ENCOUNTER — HOME CARE VISIT (OUTPATIENT)
Dept: SCHEDULING | Facility: HOME HEALTH | Age: 86
End: 2021-02-08
Payer: MEDICARE

## 2021-02-08 VITALS
SYSTOLIC BLOOD PRESSURE: 145 MMHG | TEMPERATURE: 97.4 F | HEART RATE: 54 BPM | OXYGEN SATURATION: 97 % | RESPIRATION RATE: 18 BRPM | DIASTOLIC BLOOD PRESSURE: 87 MMHG

## 2021-02-08 PROCEDURE — 0651 HSPC ROUTINE HOME CARE

## 2021-02-08 PROCEDURE — G0299 HHS/HOSPICE OF RN EA 15 MIN: HCPCS

## 2021-02-08 PROCEDURE — G0156 HHCP-SVS OF AIDE,EA 15 MIN: HCPCS

## 2021-02-09 PROCEDURE — 0651 HSPC ROUTINE HOME CARE

## 2021-02-10 ENCOUNTER — HOME CARE VISIT (OUTPATIENT)
Dept: SCHEDULING | Facility: HOME HEALTH | Age: 86
End: 2021-02-10
Payer: MEDICARE

## 2021-02-10 PROCEDURE — 0651 HSPC ROUTINE HOME CARE

## 2021-02-10 PROCEDURE — G0156 HHCP-SVS OF AIDE,EA 15 MIN: HCPCS

## 2021-02-11 PROCEDURE — 0651 HSPC ROUTINE HOME CARE

## 2021-02-12 ENCOUNTER — HOME CARE VISIT (OUTPATIENT)
Dept: SCHEDULING | Facility: HOME HEALTH | Age: 86
End: 2021-02-12
Payer: MEDICARE

## 2021-02-12 PROCEDURE — G0156 HHCP-SVS OF AIDE,EA 15 MIN: HCPCS

## 2021-02-12 PROCEDURE — 0651 HSPC ROUTINE HOME CARE

## 2021-02-13 PROCEDURE — 0651 HSPC ROUTINE HOME CARE

## 2021-02-14 PROCEDURE — 0651 HSPC ROUTINE HOME CARE

## 2021-02-15 ENCOUNTER — HOME CARE VISIT (OUTPATIENT)
Dept: SCHEDULING | Facility: HOME HEALTH | Age: 86
End: 2021-02-15
Payer: MEDICARE

## 2021-02-15 VITALS
RESPIRATION RATE: 18 BRPM | HEART RATE: 60 BPM | TEMPERATURE: 97.6 F | DIASTOLIC BLOOD PRESSURE: 75 MMHG | SYSTOLIC BLOOD PRESSURE: 119 MMHG | OXYGEN SATURATION: 97 %

## 2021-02-15 PROCEDURE — G0299 HHS/HOSPICE OF RN EA 15 MIN: HCPCS

## 2021-02-15 PROCEDURE — 0651 HSPC ROUTINE HOME CARE

## 2021-02-15 PROCEDURE — G0156 HHCP-SVS OF AIDE,EA 15 MIN: HCPCS

## 2021-02-16 PROCEDURE — 0651 HSPC ROUTINE HOME CARE

## 2021-02-17 ENCOUNTER — HOME CARE VISIT (OUTPATIENT)
Dept: SCHEDULING | Facility: HOME HEALTH | Age: 86
End: 2021-02-17
Payer: MEDICARE

## 2021-02-17 PROCEDURE — G0156 HHCP-SVS OF AIDE,EA 15 MIN: HCPCS

## 2021-02-17 PROCEDURE — 0651 HSPC ROUTINE HOME CARE

## 2021-02-18 PROCEDURE — 0651 HSPC ROUTINE HOME CARE

## 2021-02-19 ENCOUNTER — HOME CARE VISIT (OUTPATIENT)
Dept: SCHEDULING | Facility: HOME HEALTH | Age: 86
End: 2021-02-19
Payer: MEDICARE

## 2021-02-19 PROCEDURE — G0156 HHCP-SVS OF AIDE,EA 15 MIN: HCPCS

## 2021-02-19 PROCEDURE — 0651 HSPC ROUTINE HOME CARE

## 2021-02-20 PROCEDURE — 0651 HSPC ROUTINE HOME CARE

## 2021-02-21 PROCEDURE — 0651 HSPC ROUTINE HOME CARE

## 2021-02-22 ENCOUNTER — HOME CARE VISIT (OUTPATIENT)
Dept: SCHEDULING | Facility: HOME HEALTH | Age: 86
End: 2021-02-22
Payer: MEDICARE

## 2021-02-22 VITALS
TEMPERATURE: 95.5 F | RESPIRATION RATE: 16 BRPM | SYSTOLIC BLOOD PRESSURE: 115 MMHG | DIASTOLIC BLOOD PRESSURE: 70 MMHG | HEART RATE: 65 BPM

## 2021-02-22 PROCEDURE — G0156 HHCP-SVS OF AIDE,EA 15 MIN: HCPCS

## 2021-02-22 PROCEDURE — G0299 HHS/HOSPICE OF RN EA 15 MIN: HCPCS

## 2021-02-22 PROCEDURE — 0651 HSPC ROUTINE HOME CARE

## 2021-02-23 PROCEDURE — 0651 HSPC ROUTINE HOME CARE

## 2021-02-24 ENCOUNTER — HOME CARE VISIT (OUTPATIENT)
Dept: SCHEDULING | Facility: HOME HEALTH | Age: 86
End: 2021-02-24
Payer: MEDICARE

## 2021-02-24 PROCEDURE — 0651 HSPC ROUTINE HOME CARE

## 2021-02-24 PROCEDURE — G0156 HHCP-SVS OF AIDE,EA 15 MIN: HCPCS

## 2021-02-25 ENCOUNTER — HOME CARE VISIT (OUTPATIENT)
Dept: SCHEDULING | Facility: HOME HEALTH | Age: 86
End: 2021-02-25
Payer: MEDICARE

## 2021-02-25 ENCOUNTER — HOME CARE VISIT (OUTPATIENT)
Dept: HOSPICE | Facility: HOSPICE | Age: 86
End: 2021-02-25
Payer: MEDICARE

## 2021-02-25 PROCEDURE — 0651 HSPC ROUTINE HOME CARE

## 2021-02-26 ENCOUNTER — HOME CARE VISIT (OUTPATIENT)
Dept: HOSPICE | Facility: HOSPICE | Age: 86
End: 2021-02-26
Payer: MEDICARE

## 2021-02-26 PROCEDURE — 0651 HSPC ROUTINE HOME CARE

## 2021-02-26 PROCEDURE — G0156 HHCP-SVS OF AIDE,EA 15 MIN: HCPCS

## 2021-02-27 PROCEDURE — 0651 HSPC ROUTINE HOME CARE

## 2021-02-28 PROCEDURE — 0651 HSPC ROUTINE HOME CARE

## 2021-03-01 ENCOUNTER — HOME CARE VISIT (OUTPATIENT)
Dept: SCHEDULING | Facility: HOME HEALTH | Age: 86
End: 2021-03-01
Payer: MEDICARE

## 2021-03-01 ENCOUNTER — HOME CARE VISIT (OUTPATIENT)
Dept: HOSPICE | Facility: HOSPICE | Age: 86
End: 2021-03-01
Payer: MEDICARE

## 2021-03-01 PROCEDURE — G0156 HHCP-SVS OF AIDE,EA 15 MIN: HCPCS

## 2021-03-01 PROCEDURE — 0651 HSPC ROUTINE HOME CARE

## 2021-03-02 ENCOUNTER — HOME CARE VISIT (OUTPATIENT)
Dept: HOSPICE | Facility: HOSPICE | Age: 86
End: 2021-03-02
Payer: MEDICARE

## 2021-03-02 PROCEDURE — 0651 HSPC ROUTINE HOME CARE

## 2021-03-03 ENCOUNTER — HOME CARE VISIT (OUTPATIENT)
Dept: SCHEDULING | Facility: HOME HEALTH | Age: 86
End: 2021-03-03
Payer: MEDICARE

## 2021-03-03 PROCEDURE — G0156 HHCP-SVS OF AIDE,EA 15 MIN: HCPCS

## 2021-03-03 PROCEDURE — 0651 HSPC ROUTINE HOME CARE

## 2021-03-04 PROCEDURE — 0651 HSPC ROUTINE HOME CARE

## 2021-03-05 ENCOUNTER — HOME CARE VISIT (OUTPATIENT)
Dept: SCHEDULING | Facility: HOME HEALTH | Age: 86
End: 2021-03-05
Payer: MEDICARE

## 2021-03-05 PROCEDURE — 0651 HSPC ROUTINE HOME CARE

## 2021-03-05 PROCEDURE — G0156 HHCP-SVS OF AIDE,EA 15 MIN: HCPCS

## 2021-03-06 PROCEDURE — 0651 HSPC ROUTINE HOME CARE

## 2021-03-07 PROCEDURE — 0651 HSPC ROUTINE HOME CARE

## 2021-03-08 ENCOUNTER — HOME CARE VISIT (OUTPATIENT)
Dept: SCHEDULING | Facility: HOME HEALTH | Age: 86
End: 2021-03-08
Payer: MEDICARE

## 2021-03-08 VITALS
DIASTOLIC BLOOD PRESSURE: 75 MMHG | SYSTOLIC BLOOD PRESSURE: 121 MMHG | HEART RATE: 62 BPM | RESPIRATION RATE: 18 BRPM | TEMPERATURE: 98 F

## 2021-03-08 PROCEDURE — 0651 HSPC ROUTINE HOME CARE

## 2021-03-08 PROCEDURE — G0156 HHCP-SVS OF AIDE,EA 15 MIN: HCPCS

## 2021-03-08 PROCEDURE — G0299 HHS/HOSPICE OF RN EA 15 MIN: HCPCS

## 2021-03-09 PROCEDURE — 0651 HSPC ROUTINE HOME CARE

## 2021-03-10 ENCOUNTER — HOME CARE VISIT (OUTPATIENT)
Dept: SCHEDULING | Facility: HOME HEALTH | Age: 86
End: 2021-03-10
Payer: MEDICARE

## 2021-03-10 PROCEDURE — G0156 HHCP-SVS OF AIDE,EA 15 MIN: HCPCS

## 2021-03-10 PROCEDURE — 0651 HSPC ROUTINE HOME CARE

## 2021-03-11 PROCEDURE — 0651 HSPC ROUTINE HOME CARE

## 2021-03-12 ENCOUNTER — HOME CARE VISIT (OUTPATIENT)
Dept: SCHEDULING | Facility: HOME HEALTH | Age: 86
End: 2021-03-12
Payer: MEDICARE

## 2021-03-12 PROCEDURE — G0156 HHCP-SVS OF AIDE,EA 15 MIN: HCPCS

## 2021-03-12 PROCEDURE — 0651 HSPC ROUTINE HOME CARE

## 2021-03-13 PROCEDURE — 0651 HSPC ROUTINE HOME CARE

## 2021-03-14 PROCEDURE — 0651 HSPC ROUTINE HOME CARE

## 2021-03-15 ENCOUNTER — HOME CARE VISIT (OUTPATIENT)
Dept: SCHEDULING | Facility: HOME HEALTH | Age: 86
End: 2021-03-15
Payer: MEDICARE

## 2021-03-15 ENCOUNTER — HOME CARE VISIT (OUTPATIENT)
Dept: HOSPICE | Facility: HOSPICE | Age: 86
End: 2021-03-15
Payer: MEDICARE

## 2021-03-15 VITALS
RESPIRATION RATE: 16 BRPM | HEART RATE: 68 BPM | DIASTOLIC BLOOD PRESSURE: 73 MMHG | SYSTOLIC BLOOD PRESSURE: 126 MMHG | TEMPERATURE: 97.2 F | OXYGEN SATURATION: 90 %

## 2021-03-15 PROCEDURE — G0156 HHCP-SVS OF AIDE,EA 15 MIN: HCPCS

## 2021-03-15 PROCEDURE — G0155 HHCP-SVS OF CSW,EA 15 MIN: HCPCS

## 2021-03-15 PROCEDURE — G0299 HHS/HOSPICE OF RN EA 15 MIN: HCPCS

## 2021-03-15 PROCEDURE — 0651 HSPC ROUTINE HOME CARE

## 2021-03-16 PROCEDURE — 0651 HSPC ROUTINE HOME CARE

## 2021-03-17 ENCOUNTER — HOME CARE VISIT (OUTPATIENT)
Dept: SCHEDULING | Facility: HOME HEALTH | Age: 86
End: 2021-03-17
Payer: MEDICARE

## 2021-03-17 PROCEDURE — G0156 HHCP-SVS OF AIDE,EA 15 MIN: HCPCS

## 2021-03-17 PROCEDURE — 0651 HSPC ROUTINE HOME CARE

## 2021-03-18 PROCEDURE — 0651 HSPC ROUTINE HOME CARE

## 2021-03-19 ENCOUNTER — HOME CARE VISIT (OUTPATIENT)
Dept: HOSPICE | Facility: HOSPICE | Age: 86
End: 2021-03-19
Payer: MEDICARE

## 2021-03-19 PROCEDURE — 0651 HSPC ROUTINE HOME CARE

## 2021-03-20 PROCEDURE — 0651 HSPC ROUTINE HOME CARE

## 2021-03-21 PROCEDURE — 0651 HSPC ROUTINE HOME CARE

## 2021-03-22 ENCOUNTER — HOME CARE VISIT (OUTPATIENT)
Dept: SCHEDULING | Facility: HOME HEALTH | Age: 86
End: 2021-03-22
Payer: MEDICARE

## 2021-03-22 VITALS
TEMPERATURE: 97.7 F | RESPIRATION RATE: 18 BRPM | SYSTOLIC BLOOD PRESSURE: 126 MMHG | DIASTOLIC BLOOD PRESSURE: 77 MMHG | HEART RATE: 62 BPM | OXYGEN SATURATION: 98 %

## 2021-03-22 PROCEDURE — G0156 HHCP-SVS OF AIDE,EA 15 MIN: HCPCS

## 2021-03-22 PROCEDURE — 0651 HSPC ROUTINE HOME CARE

## 2021-03-22 PROCEDURE — G0299 HHS/HOSPICE OF RN EA 15 MIN: HCPCS

## 2021-03-23 PROCEDURE — 0651 HSPC ROUTINE HOME CARE

## 2021-03-24 ENCOUNTER — HOME CARE VISIT (OUTPATIENT)
Dept: SCHEDULING | Facility: HOME HEALTH | Age: 86
End: 2021-03-24
Payer: MEDICARE

## 2021-03-24 PROCEDURE — 0651 HSPC ROUTINE HOME CARE

## 2021-03-24 PROCEDURE — G0156 HHCP-SVS OF AIDE,EA 15 MIN: HCPCS

## 2021-03-25 PROCEDURE — 0651 HSPC ROUTINE HOME CARE

## 2021-03-26 ENCOUNTER — HOME CARE VISIT (OUTPATIENT)
Dept: HOSPICE | Facility: HOSPICE | Age: 86
End: 2021-03-26
Payer: MEDICARE

## 2021-03-26 PROCEDURE — 0651 HSPC ROUTINE HOME CARE

## 2021-03-27 PROCEDURE — 0651 HSPC ROUTINE HOME CARE

## 2021-03-28 PROCEDURE — 0651 HSPC ROUTINE HOME CARE

## 2021-03-29 ENCOUNTER — HOME CARE VISIT (OUTPATIENT)
Dept: SCHEDULING | Facility: HOME HEALTH | Age: 86
End: 2021-03-29
Payer: MEDICARE

## 2021-03-29 VITALS
RESPIRATION RATE: 18 BRPM | SYSTOLIC BLOOD PRESSURE: 130 MMHG | HEART RATE: 64 BPM | TEMPERATURE: 96.7 F | DIASTOLIC BLOOD PRESSURE: 76 MMHG

## 2021-03-29 PROCEDURE — G0156 HHCP-SVS OF AIDE,EA 15 MIN: HCPCS

## 2021-03-29 PROCEDURE — G0299 HHS/HOSPICE OF RN EA 15 MIN: HCPCS

## 2021-03-29 PROCEDURE — 0651 HSPC ROUTINE HOME CARE

## 2021-03-30 PROCEDURE — 0651 HSPC ROUTINE HOME CARE

## 2021-03-31 ENCOUNTER — HOME CARE VISIT (OUTPATIENT)
Dept: SCHEDULING | Facility: HOME HEALTH | Age: 86
End: 2021-03-31
Payer: MEDICARE

## 2021-03-31 PROCEDURE — 0651 HSPC ROUTINE HOME CARE

## 2021-03-31 PROCEDURE — G0156 HHCP-SVS OF AIDE,EA 15 MIN: HCPCS

## 2021-04-01 PROCEDURE — 0651 HSPC ROUTINE HOME CARE

## 2021-04-02 ENCOUNTER — HOME CARE VISIT (OUTPATIENT)
Dept: HOSPICE | Facility: HOSPICE | Age: 86
End: 2021-04-02
Payer: MEDICARE

## 2021-04-02 ENCOUNTER — HOME CARE VISIT (OUTPATIENT)
Dept: SCHEDULING | Facility: HOME HEALTH | Age: 86
End: 2021-04-02
Payer: MEDICARE

## 2021-04-02 PROCEDURE — 0651 HSPC ROUTINE HOME CARE

## 2021-04-02 PROCEDURE — G0156 HHCP-SVS OF AIDE,EA 15 MIN: HCPCS

## 2021-04-03 PROCEDURE — 0651 HSPC ROUTINE HOME CARE

## 2021-04-04 PROCEDURE — 0651 HSPC ROUTINE HOME CARE

## 2021-04-05 ENCOUNTER — HOME CARE VISIT (OUTPATIENT)
Dept: HOSPICE | Facility: HOSPICE | Age: 86
End: 2021-04-05
Payer: MEDICARE

## 2021-04-05 ENCOUNTER — HOME CARE VISIT (OUTPATIENT)
Dept: SCHEDULING | Facility: HOME HEALTH | Age: 86
End: 2021-04-05
Payer: MEDICARE

## 2021-04-05 VITALS
RESPIRATION RATE: 18 BRPM | HEART RATE: 60 BPM | SYSTOLIC BLOOD PRESSURE: 112 MMHG | OXYGEN SATURATION: 90 % | TEMPERATURE: 96.8 F | DIASTOLIC BLOOD PRESSURE: 75 MMHG

## 2021-04-05 PROCEDURE — 0651 HSPC ROUTINE HOME CARE

## 2021-04-05 PROCEDURE — G0156 HHCP-SVS OF AIDE,EA 15 MIN: HCPCS

## 2021-04-05 PROCEDURE — G0299 HHS/HOSPICE OF RN EA 15 MIN: HCPCS

## 2021-04-06 ENCOUNTER — HOME CARE VISIT (OUTPATIENT)
Dept: HOSPICE | Facility: HOSPICE | Age: 86
End: 2021-04-06
Payer: MEDICARE

## 2021-04-06 PROCEDURE — 0651 HSPC ROUTINE HOME CARE

## 2021-04-07 ENCOUNTER — HOME CARE VISIT (OUTPATIENT)
Dept: HOSPICE | Facility: HOSPICE | Age: 86
End: 2021-04-07
Payer: MEDICARE

## 2021-04-07 ENCOUNTER — HOME CARE VISIT (OUTPATIENT)
Dept: SCHEDULING | Facility: HOME HEALTH | Age: 86
End: 2021-04-07
Payer: MEDICARE

## 2021-04-07 PROCEDURE — G0156 HHCP-SVS OF AIDE,EA 15 MIN: HCPCS

## 2021-04-07 PROCEDURE — 0651 HSPC ROUTINE HOME CARE

## 2021-04-08 PROCEDURE — 0651 HSPC ROUTINE HOME CARE

## 2021-04-09 ENCOUNTER — HOME CARE VISIT (OUTPATIENT)
Dept: SCHEDULING | Facility: HOME HEALTH | Age: 86
End: 2021-04-09
Payer: MEDICARE

## 2021-04-09 ENCOUNTER — HOME CARE VISIT (OUTPATIENT)
Dept: HOSPICE | Facility: HOSPICE | Age: 86
End: 2021-04-09
Payer: MEDICARE

## 2021-04-09 PROCEDURE — G0156 HHCP-SVS OF AIDE,EA 15 MIN: HCPCS

## 2021-04-09 PROCEDURE — 0651 HSPC ROUTINE HOME CARE

## 2021-04-10 PROCEDURE — 0651 HSPC ROUTINE HOME CARE

## 2021-04-11 ENCOUNTER — HOME CARE VISIT (OUTPATIENT)
Dept: SCHEDULING | Facility: HOME HEALTH | Age: 86
End: 2021-04-11
Payer: MEDICARE

## 2021-04-11 PROCEDURE — 0651 HSPC ROUTINE HOME CARE

## 2021-04-12 ENCOUNTER — HOME CARE VISIT (OUTPATIENT)
Dept: SCHEDULING | Facility: HOME HEALTH | Age: 86
End: 2021-04-12
Payer: MEDICARE

## 2021-04-12 VITALS
RESPIRATION RATE: 18 BRPM | DIASTOLIC BLOOD PRESSURE: 73 MMHG | SYSTOLIC BLOOD PRESSURE: 140 MMHG | HEART RATE: 60 BPM | TEMPERATURE: 97.3 F | OXYGEN SATURATION: 99 %

## 2021-04-12 PROCEDURE — G0299 HHS/HOSPICE OF RN EA 15 MIN: HCPCS

## 2021-04-12 PROCEDURE — G0156 HHCP-SVS OF AIDE,EA 15 MIN: HCPCS

## 2021-04-12 PROCEDURE — 0651 HSPC ROUTINE HOME CARE

## 2021-04-13 PROCEDURE — 0651 HSPC ROUTINE HOME CARE

## 2021-04-14 ENCOUNTER — HOME CARE VISIT (OUTPATIENT)
Dept: SCHEDULING | Facility: HOME HEALTH | Age: 86
End: 2021-04-14
Payer: MEDICARE

## 2021-04-14 PROCEDURE — 0651 HSPC ROUTINE HOME CARE

## 2021-04-14 PROCEDURE — G0156 HHCP-SVS OF AIDE,EA 15 MIN: HCPCS

## 2021-04-15 PROCEDURE — 0651 HSPC ROUTINE HOME CARE

## 2021-04-16 ENCOUNTER — HOME CARE VISIT (OUTPATIENT)
Dept: SCHEDULING | Facility: HOME HEALTH | Age: 86
End: 2021-04-16
Payer: MEDICARE

## 2021-04-16 PROCEDURE — 0651 HSPC ROUTINE HOME CARE

## 2021-04-16 PROCEDURE — G0156 HHCP-SVS OF AIDE,EA 15 MIN: HCPCS

## 2021-04-17 PROCEDURE — 0651 HSPC ROUTINE HOME CARE

## 2021-04-18 PROCEDURE — 0651 HSPC ROUTINE HOME CARE

## 2021-04-19 ENCOUNTER — HOME CARE VISIT (OUTPATIENT)
Dept: HOSPICE | Facility: HOSPICE | Age: 86
End: 2021-04-19
Payer: MEDICARE

## 2021-04-19 ENCOUNTER — HOME CARE VISIT (OUTPATIENT)
Dept: SCHEDULING | Facility: HOME HEALTH | Age: 86
End: 2021-04-19
Payer: MEDICARE

## 2021-04-19 VITALS
OXYGEN SATURATION: 99 % | SYSTOLIC BLOOD PRESSURE: 131 MMHG | HEART RATE: 59 BPM | TEMPERATURE: 97 F | RESPIRATION RATE: 16 BRPM | DIASTOLIC BLOOD PRESSURE: 79 MMHG

## 2021-04-19 PROCEDURE — G0299 HHS/HOSPICE OF RN EA 15 MIN: HCPCS

## 2021-04-19 PROCEDURE — G0156 HHCP-SVS OF AIDE,EA 15 MIN: HCPCS

## 2021-04-19 PROCEDURE — G0155 HHCP-SVS OF CSW,EA 15 MIN: HCPCS

## 2021-04-19 PROCEDURE — 0651 HSPC ROUTINE HOME CARE

## 2021-04-20 PROCEDURE — 0651 HSPC ROUTINE HOME CARE

## 2021-04-21 ENCOUNTER — HOME CARE VISIT (OUTPATIENT)
Dept: SCHEDULING | Facility: HOME HEALTH | Age: 86
End: 2021-04-21
Payer: MEDICARE

## 2021-04-21 PROCEDURE — 0651 HSPC ROUTINE HOME CARE

## 2021-04-21 PROCEDURE — G0156 HHCP-SVS OF AIDE,EA 15 MIN: HCPCS

## 2021-04-22 PROCEDURE — 0651 HSPC ROUTINE HOME CARE

## 2021-04-23 ENCOUNTER — HOME CARE VISIT (OUTPATIENT)
Dept: HOSPICE | Facility: HOSPICE | Age: 86
End: 2021-04-23
Payer: MEDICARE

## 2021-04-23 PROCEDURE — 0651 HSPC ROUTINE HOME CARE

## 2021-04-24 PROCEDURE — 0651 HSPC ROUTINE HOME CARE

## 2021-04-25 PROCEDURE — 0651 HSPC ROUTINE HOME CARE

## 2021-04-26 ENCOUNTER — HOME CARE VISIT (OUTPATIENT)
Dept: SCHEDULING | Facility: HOME HEALTH | Age: 86
End: 2021-04-26
Payer: MEDICARE

## 2021-04-26 VITALS
OXYGEN SATURATION: 92 % | SYSTOLIC BLOOD PRESSURE: 134 MMHG | HEART RATE: 60 BPM | TEMPERATURE: 97.5 F | RESPIRATION RATE: 20 BRPM | DIASTOLIC BLOOD PRESSURE: 84 MMHG

## 2021-04-26 PROCEDURE — 0651 HSPC ROUTINE HOME CARE

## 2021-04-26 PROCEDURE — G0156 HHCP-SVS OF AIDE,EA 15 MIN: HCPCS

## 2021-04-26 PROCEDURE — G0299 HHS/HOSPICE OF RN EA 15 MIN: HCPCS

## 2021-04-26 PROCEDURE — HOSPICE MEDICATION HC HH HOSPICE MEDICATION

## 2021-04-27 PROCEDURE — 0651 HSPC ROUTINE HOME CARE

## 2021-04-28 ENCOUNTER — HOME CARE VISIT (OUTPATIENT)
Dept: HOSPICE | Facility: HOSPICE | Age: 86
End: 2021-04-28
Payer: MEDICARE

## 2021-04-28 PROCEDURE — 0651 HSPC ROUTINE HOME CARE

## 2021-04-29 PROCEDURE — 0651 HSPC ROUTINE HOME CARE

## 2021-04-30 ENCOUNTER — HOME CARE VISIT (OUTPATIENT)
Dept: SCHEDULING | Facility: HOME HEALTH | Age: 86
End: 2021-04-30
Payer: MEDICARE

## 2021-04-30 PROCEDURE — G0156 HHCP-SVS OF AIDE,EA 15 MIN: HCPCS

## 2021-04-30 PROCEDURE — 0651 HSPC ROUTINE HOME CARE

## 2021-05-01 PROCEDURE — 0651 HSPC ROUTINE HOME CARE

## 2021-05-02 PROCEDURE — 0651 HSPC ROUTINE HOME CARE

## 2021-05-03 ENCOUNTER — HOME CARE VISIT (OUTPATIENT)
Dept: SCHEDULING | Facility: HOME HEALTH | Age: 86
End: 2021-05-03
Payer: MEDICARE

## 2021-05-03 VITALS
RESPIRATION RATE: 18 BRPM | TEMPERATURE: 97.9 F | HEART RATE: 68 BPM | DIASTOLIC BLOOD PRESSURE: 86 MMHG | OXYGEN SATURATION: 90 % | SYSTOLIC BLOOD PRESSURE: 146 MMHG

## 2021-05-03 PROCEDURE — 0651 HSPC ROUTINE HOME CARE

## 2021-05-03 PROCEDURE — G0156 HHCP-SVS OF AIDE,EA 15 MIN: HCPCS

## 2021-05-03 PROCEDURE — G0299 HHS/HOSPICE OF RN EA 15 MIN: HCPCS

## 2021-05-03 PROCEDURE — G0155 HHCP-SVS OF CSW,EA 15 MIN: HCPCS

## 2021-05-04 PROCEDURE — 0651 HSPC ROUTINE HOME CARE

## 2021-05-05 ENCOUNTER — HOME CARE VISIT (OUTPATIENT)
Dept: SCHEDULING | Facility: HOME HEALTH | Age: 86
End: 2021-05-05
Payer: MEDICARE

## 2021-05-05 PROCEDURE — G0156 HHCP-SVS OF AIDE,EA 15 MIN: HCPCS

## 2021-05-05 PROCEDURE — 0651 HSPC ROUTINE HOME CARE

## 2021-05-06 PROCEDURE — 0651 HSPC ROUTINE HOME CARE

## 2021-05-07 ENCOUNTER — HOME CARE VISIT (OUTPATIENT)
Dept: SCHEDULING | Facility: HOME HEALTH | Age: 86
End: 2021-05-07
Payer: MEDICARE

## 2021-05-07 PROCEDURE — 0651 HSPC ROUTINE HOME CARE

## 2021-05-07 PROCEDURE — G0156 HHCP-SVS OF AIDE,EA 15 MIN: HCPCS

## 2021-05-08 PROCEDURE — 0651 HSPC ROUTINE HOME CARE

## 2021-05-09 PROCEDURE — 0651 HSPC ROUTINE HOME CARE

## 2021-05-10 ENCOUNTER — HOME CARE VISIT (OUTPATIENT)
Dept: HOSPICE | Facility: HOSPICE | Age: 86
End: 2021-05-10
Payer: MEDICARE

## 2021-05-10 ENCOUNTER — HOME CARE VISIT (OUTPATIENT)
Dept: SCHEDULING | Facility: HOME HEALTH | Age: 86
End: 2021-05-10
Payer: MEDICARE

## 2021-05-10 VITALS
OXYGEN SATURATION: 94 % | DIASTOLIC BLOOD PRESSURE: 68 MMHG | RESPIRATION RATE: 18 BRPM | SYSTOLIC BLOOD PRESSURE: 133 MMHG | HEART RATE: 72 BPM | TEMPERATURE: 97.8 F

## 2021-05-10 PROCEDURE — G0155 HHCP-SVS OF CSW,EA 15 MIN: HCPCS

## 2021-05-10 PROCEDURE — 0651 HSPC ROUTINE HOME CARE

## 2021-05-10 PROCEDURE — G0299 HHS/HOSPICE OF RN EA 15 MIN: HCPCS

## 2021-05-11 PROCEDURE — 0651 HSPC ROUTINE HOME CARE

## 2021-05-12 ENCOUNTER — HOME CARE VISIT (OUTPATIENT)
Dept: HOSPICE | Facility: HOSPICE | Age: 86
End: 2021-05-12
Payer: MEDICARE

## 2021-05-12 PROCEDURE — 0651 HSPC ROUTINE HOME CARE

## 2021-05-13 PROCEDURE — 0651 HSPC ROUTINE HOME CARE

## 2021-05-14 ENCOUNTER — HOME CARE VISIT (OUTPATIENT)
Dept: SCHEDULING | Facility: HOME HEALTH | Age: 86
End: 2021-05-14
Payer: MEDICARE

## 2021-05-14 PROCEDURE — 0651 HSPC ROUTINE HOME CARE

## 2021-05-14 PROCEDURE — G0156 HHCP-SVS OF AIDE,EA 15 MIN: HCPCS

## 2021-05-15 PROCEDURE — 0651 HSPC ROUTINE HOME CARE

## 2021-05-16 PROCEDURE — 0651 HSPC ROUTINE HOME CARE

## 2021-05-17 ENCOUNTER — HOME CARE VISIT (OUTPATIENT)
Dept: SCHEDULING | Facility: HOME HEALTH | Age: 86
End: 2021-05-17
Payer: MEDICARE

## 2021-05-17 VITALS
HEART RATE: 57 BPM | RESPIRATION RATE: 16 BRPM | SYSTOLIC BLOOD PRESSURE: 123 MMHG | OXYGEN SATURATION: 92 % | TEMPERATURE: 98 F | DIASTOLIC BLOOD PRESSURE: 86 MMHG

## 2021-05-17 PROCEDURE — G0299 HHS/HOSPICE OF RN EA 15 MIN: HCPCS

## 2021-05-17 PROCEDURE — G0156 HHCP-SVS OF AIDE,EA 15 MIN: HCPCS

## 2021-05-17 PROCEDURE — 0651 HSPC ROUTINE HOME CARE

## 2021-05-18 PROCEDURE — 0651 HSPC ROUTINE HOME CARE

## 2021-05-19 ENCOUNTER — HOME CARE VISIT (OUTPATIENT)
Dept: SCHEDULING | Facility: HOME HEALTH | Age: 86
End: 2021-05-19
Payer: MEDICARE

## 2021-05-19 PROCEDURE — G0156 HHCP-SVS OF AIDE,EA 15 MIN: HCPCS

## 2021-05-19 PROCEDURE — 0651 HSPC ROUTINE HOME CARE

## 2021-05-20 PROCEDURE — 0651 HSPC ROUTINE HOME CARE

## 2021-05-21 ENCOUNTER — HOME CARE VISIT (OUTPATIENT)
Dept: SCHEDULING | Facility: HOME HEALTH | Age: 86
End: 2021-05-21
Payer: MEDICARE

## 2021-05-21 PROCEDURE — G0156 HHCP-SVS OF AIDE,EA 15 MIN: HCPCS

## 2021-05-21 PROCEDURE — 0651 HSPC ROUTINE HOME CARE

## 2021-05-22 PROCEDURE — 0651 HSPC ROUTINE HOME CARE

## 2021-05-23 PROCEDURE — 0651 HSPC ROUTINE HOME CARE

## 2021-05-24 ENCOUNTER — HOME CARE VISIT (OUTPATIENT)
Dept: SCHEDULING | Facility: HOME HEALTH | Age: 86
End: 2021-05-24
Payer: MEDICARE

## 2021-05-24 VITALS
RESPIRATION RATE: 16 BRPM | SYSTOLIC BLOOD PRESSURE: 140 MMHG | DIASTOLIC BLOOD PRESSURE: 85 MMHG | HEART RATE: 57 BPM | TEMPERATURE: 97.5 F

## 2021-05-24 PROCEDURE — G0299 HHS/HOSPICE OF RN EA 15 MIN: HCPCS

## 2021-05-24 PROCEDURE — G0156 HHCP-SVS OF AIDE,EA 15 MIN: HCPCS

## 2021-05-24 PROCEDURE — 0651 HSPC ROUTINE HOME CARE

## 2021-05-25 PROCEDURE — 0651 HSPC ROUTINE HOME CARE

## 2021-05-26 ENCOUNTER — HOME CARE VISIT (OUTPATIENT)
Dept: SCHEDULING | Facility: HOME HEALTH | Age: 86
End: 2021-05-26
Payer: MEDICARE

## 2021-05-26 PROCEDURE — 0651 HSPC ROUTINE HOME CARE

## 2021-05-26 PROCEDURE — G0156 HHCP-SVS OF AIDE,EA 15 MIN: HCPCS

## 2021-05-27 ENCOUNTER — HOME CARE VISIT (OUTPATIENT)
Dept: HOSPICE | Facility: HOSPICE | Age: 86
End: 2021-05-27
Payer: MEDICARE

## 2021-05-27 PROCEDURE — 0651 HSPC ROUTINE HOME CARE

## 2021-05-27 NOTE — CASE COMMUNICATION
Phoned to offer either virtual or face/face routine visit. Paid caregiver answered  Handy's phone and said he was not in.  asked sitter to tell  that  had inquired re  and would be checking back.

## 2021-05-28 ENCOUNTER — HOME CARE VISIT (OUTPATIENT)
Dept: SCHEDULING | Facility: HOME HEALTH | Age: 86
End: 2021-05-28
Payer: MEDICARE

## 2021-05-28 PROCEDURE — G0156 HHCP-SVS OF AIDE,EA 15 MIN: HCPCS

## 2021-05-28 PROCEDURE — 0651 HSPC ROUTINE HOME CARE

## 2021-05-29 PROCEDURE — 0651 HSPC ROUTINE HOME CARE

## 2021-05-30 PROCEDURE — 0651 HSPC ROUTINE HOME CARE

## 2021-05-31 ENCOUNTER — HOME CARE VISIT (OUTPATIENT)
Dept: HOSPICE | Facility: HOSPICE | Age: 86
End: 2021-05-31
Payer: MEDICARE

## 2021-05-31 PROCEDURE — 0651 HSPC ROUTINE HOME CARE

## 2021-06-01 PROCEDURE — 0651 HSPC ROUTINE HOME CARE

## 2021-06-02 ENCOUNTER — HOME CARE VISIT (OUTPATIENT)
Dept: SCHEDULING | Facility: HOME HEALTH | Age: 86
End: 2021-06-02
Payer: MEDICARE

## 2021-06-02 ENCOUNTER — HOME CARE VISIT (OUTPATIENT)
Dept: HOSPICE | Facility: HOSPICE | Age: 86
End: 2021-06-02
Payer: MEDICARE

## 2021-06-02 VITALS
RESPIRATION RATE: 18 BRPM | SYSTOLIC BLOOD PRESSURE: 139 MMHG | OXYGEN SATURATION: 93 % | TEMPERATURE: 98.3 F | DIASTOLIC BLOOD PRESSURE: 87 MMHG | HEART RATE: 72 BPM

## 2021-06-02 PROCEDURE — 0651 HSPC ROUTINE HOME CARE

## 2021-06-02 PROCEDURE — G0156 HHCP-SVS OF AIDE,EA 15 MIN: HCPCS

## 2021-06-02 PROCEDURE — G0299 HHS/HOSPICE OF RN EA 15 MIN: HCPCS

## 2021-06-03 PROCEDURE — 0651 HSPC ROUTINE HOME CARE

## 2021-06-04 ENCOUNTER — HOME CARE VISIT (OUTPATIENT)
Dept: SCHEDULING | Facility: HOME HEALTH | Age: 86
End: 2021-06-04
Payer: MEDICARE

## 2021-06-04 ENCOUNTER — HOME CARE VISIT (OUTPATIENT)
Dept: HOSPICE | Facility: HOSPICE | Age: 86
End: 2021-06-04
Payer: MEDICARE

## 2021-06-04 PROCEDURE — 0651 HSPC ROUTINE HOME CARE

## 2021-06-04 PROCEDURE — G0156 HHCP-SVS OF AIDE,EA 15 MIN: HCPCS

## 2021-06-05 PROCEDURE — 0651 HSPC ROUTINE HOME CARE

## 2021-06-06 PROCEDURE — 0651 HSPC ROUTINE HOME CARE

## 2021-06-07 ENCOUNTER — HOME CARE VISIT (OUTPATIENT)
Dept: SCHEDULING | Facility: HOME HEALTH | Age: 86
End: 2021-06-07
Payer: MEDICARE

## 2021-06-07 ENCOUNTER — HOME CARE VISIT (OUTPATIENT)
Dept: HOSPICE | Facility: HOSPICE | Age: 86
End: 2021-06-07
Payer: MEDICARE

## 2021-06-07 VITALS
SYSTOLIC BLOOD PRESSURE: 129 MMHG | DIASTOLIC BLOOD PRESSURE: 80 MMHG | OXYGEN SATURATION: 98 % | HEART RATE: 56 BPM | RESPIRATION RATE: 16 BRPM | TEMPERATURE: 98 F

## 2021-06-07 PROCEDURE — G0155 HHCP-SVS OF CSW,EA 15 MIN: HCPCS

## 2021-06-07 PROCEDURE — G0299 HHS/HOSPICE OF RN EA 15 MIN: HCPCS

## 2021-06-07 PROCEDURE — G0156 HHCP-SVS OF AIDE,EA 15 MIN: HCPCS

## 2021-06-07 PROCEDURE — 0651 HSPC ROUTINE HOME CARE

## 2021-06-08 PROCEDURE — 0651 HSPC ROUTINE HOME CARE

## 2021-06-09 ENCOUNTER — HOME CARE VISIT (OUTPATIENT)
Dept: HOSPICE | Facility: HOSPICE | Age: 86
End: 2021-06-09
Payer: MEDICARE

## 2021-06-09 PROCEDURE — 0651 HSPC ROUTINE HOME CARE

## 2021-06-10 ENCOUNTER — HOME CARE VISIT (OUTPATIENT)
Dept: HOSPICE | Facility: HOSPICE | Age: 86
End: 2021-06-10
Payer: MEDICARE

## 2021-06-10 PROCEDURE — 0651 HSPC ROUTINE HOME CARE

## 2021-06-11 ENCOUNTER — HOME CARE VISIT (OUTPATIENT)
Dept: SCHEDULING | Facility: HOME HEALTH | Age: 86
End: 2021-06-11
Payer: MEDICARE

## 2021-06-11 PROCEDURE — 0651 HSPC ROUTINE HOME CARE

## 2021-06-11 PROCEDURE — G0156 HHCP-SVS OF AIDE,EA 15 MIN: HCPCS

## 2021-06-12 PROCEDURE — 0651 HSPC ROUTINE HOME CARE

## 2021-06-13 PROCEDURE — 0651 HSPC ROUTINE HOME CARE

## 2021-06-14 ENCOUNTER — HOME CARE VISIT (OUTPATIENT)
Dept: SCHEDULING | Facility: HOME HEALTH | Age: 86
End: 2021-06-14
Payer: MEDICARE

## 2021-06-14 VITALS
SYSTOLIC BLOOD PRESSURE: 138 MMHG | OXYGEN SATURATION: 95 % | DIASTOLIC BLOOD PRESSURE: 73 MMHG | TEMPERATURE: 98.6 F | RESPIRATION RATE: 16 BRPM | HEART RATE: 65 BPM

## 2021-06-14 PROCEDURE — G0299 HHS/HOSPICE OF RN EA 15 MIN: HCPCS

## 2021-06-14 PROCEDURE — G0156 HHCP-SVS OF AIDE,EA 15 MIN: HCPCS

## 2021-06-14 PROCEDURE — 0651 HSPC ROUTINE HOME CARE

## 2021-06-15 PROCEDURE — 0651 HSPC ROUTINE HOME CARE

## 2021-06-16 ENCOUNTER — HOME CARE VISIT (OUTPATIENT)
Dept: SCHEDULING | Facility: HOME HEALTH | Age: 86
End: 2021-06-16
Payer: MEDICARE

## 2021-06-16 PROCEDURE — 0651 HSPC ROUTINE HOME CARE

## 2021-06-16 PROCEDURE — G0156 HHCP-SVS OF AIDE,EA 15 MIN: HCPCS

## 2021-06-17 PROCEDURE — 0651 HSPC ROUTINE HOME CARE

## 2021-06-18 ENCOUNTER — HOME CARE VISIT (OUTPATIENT)
Dept: SCHEDULING | Facility: HOME HEALTH | Age: 86
End: 2021-06-18
Payer: MEDICARE

## 2021-06-18 PROCEDURE — G0156 HHCP-SVS OF AIDE,EA 15 MIN: HCPCS

## 2021-06-18 PROCEDURE — 0651 HSPC ROUTINE HOME CARE

## 2021-06-19 PROCEDURE — 0651 HSPC ROUTINE HOME CARE

## 2021-06-20 PROCEDURE — 0651 HSPC ROUTINE HOME CARE

## 2021-06-21 ENCOUNTER — HOME CARE VISIT (OUTPATIENT)
Dept: SCHEDULING | Facility: HOME HEALTH | Age: 86
End: 2021-06-21
Payer: MEDICARE

## 2021-06-21 VITALS
TEMPERATURE: 97.5 F | SYSTOLIC BLOOD PRESSURE: 145 MMHG | RESPIRATION RATE: 16 BRPM | OXYGEN SATURATION: 95 % | DIASTOLIC BLOOD PRESSURE: 81 MMHG | HEART RATE: 48 BPM

## 2021-06-21 PROCEDURE — 0651 HSPC ROUTINE HOME CARE

## 2021-06-21 PROCEDURE — G0156 HHCP-SVS OF AIDE,EA 15 MIN: HCPCS

## 2021-06-21 PROCEDURE — G0299 HHS/HOSPICE OF RN EA 15 MIN: HCPCS

## 2021-06-22 PROCEDURE — 0651 HSPC ROUTINE HOME CARE

## 2021-06-23 ENCOUNTER — HOME CARE VISIT (OUTPATIENT)
Dept: SCHEDULING | Facility: HOME HEALTH | Age: 86
End: 2021-06-23
Payer: MEDICARE

## 2021-06-23 PROCEDURE — G0156 HHCP-SVS OF AIDE,EA 15 MIN: HCPCS

## 2021-06-23 PROCEDURE — 0651 HSPC ROUTINE HOME CARE

## 2021-06-24 PROCEDURE — 0651 HSPC ROUTINE HOME CARE

## 2021-06-25 ENCOUNTER — HOME CARE VISIT (OUTPATIENT)
Dept: SCHEDULING | Facility: HOME HEALTH | Age: 86
End: 2021-06-25
Payer: MEDICARE

## 2021-06-25 PROCEDURE — 0651 HSPC ROUTINE HOME CARE

## 2021-06-25 PROCEDURE — G0156 HHCP-SVS OF AIDE,EA 15 MIN: HCPCS

## 2021-06-26 PROCEDURE — 0651 HSPC ROUTINE HOME CARE

## 2021-06-27 PROCEDURE — 0651 HSPC ROUTINE HOME CARE

## 2021-06-28 ENCOUNTER — HOME CARE VISIT (OUTPATIENT)
Dept: SCHEDULING | Facility: HOME HEALTH | Age: 86
End: 2021-06-28
Payer: MEDICARE

## 2021-06-28 VITALS
HEART RATE: 64 BPM | SYSTOLIC BLOOD PRESSURE: 124 MMHG | DIASTOLIC BLOOD PRESSURE: 73 MMHG | TEMPERATURE: 97.4 F | RESPIRATION RATE: 16 BRPM

## 2021-06-28 PROCEDURE — G0156 HHCP-SVS OF AIDE,EA 15 MIN: HCPCS

## 2021-06-28 PROCEDURE — 0651 HSPC ROUTINE HOME CARE

## 2021-06-28 PROCEDURE — G0299 HHS/HOSPICE OF RN EA 15 MIN: HCPCS

## 2021-06-29 PROCEDURE — 0651 HSPC ROUTINE HOME CARE

## 2021-06-30 ENCOUNTER — HOME CARE VISIT (OUTPATIENT)
Dept: SCHEDULING | Facility: HOME HEALTH | Age: 86
End: 2021-06-30
Payer: MEDICARE

## 2021-06-30 PROCEDURE — 0651 HSPC ROUTINE HOME CARE

## 2021-06-30 PROCEDURE — G0156 HHCP-SVS OF AIDE,EA 15 MIN: HCPCS

## 2021-07-01 PROCEDURE — 0651 HSPC ROUTINE HOME CARE

## 2021-07-02 ENCOUNTER — HOME CARE VISIT (OUTPATIENT)
Dept: SCHEDULING | Facility: HOME HEALTH | Age: 86
End: 2021-07-02
Payer: MEDICARE

## 2021-07-02 PROCEDURE — G0156 HHCP-SVS OF AIDE,EA 15 MIN: HCPCS

## 2021-07-02 PROCEDURE — 0651 HSPC ROUTINE HOME CARE

## 2021-07-03 PROCEDURE — 0651 HSPC ROUTINE HOME CARE

## 2021-07-04 PROCEDURE — 0651 HSPC ROUTINE HOME CARE

## 2021-07-05 ENCOUNTER — HOME CARE VISIT (OUTPATIENT)
Dept: HOSPICE | Facility: HOSPICE | Age: 86
End: 2021-07-05
Payer: MEDICARE

## 2021-07-05 PROCEDURE — 0651 HSPC ROUTINE HOME CARE

## 2021-07-06 ENCOUNTER — HOME CARE VISIT (OUTPATIENT)
Dept: HOSPICE | Facility: HOSPICE | Age: 86
End: 2021-07-06
Payer: MEDICARE

## 2021-07-06 ENCOUNTER — HOME CARE VISIT (OUTPATIENT)
Dept: SCHEDULING | Facility: HOME HEALTH | Age: 86
End: 2021-07-06
Payer: MEDICARE

## 2021-07-06 PROCEDURE — G0156 HHCP-SVS OF AIDE,EA 15 MIN: HCPCS

## 2021-07-06 PROCEDURE — 0651 HSPC ROUTINE HOME CARE

## 2021-07-07 ENCOUNTER — HOME CARE VISIT (OUTPATIENT)
Dept: SCHEDULING | Facility: HOME HEALTH | Age: 86
End: 2021-07-07
Payer: MEDICARE

## 2021-07-07 ENCOUNTER — HOME CARE VISIT (OUTPATIENT)
Dept: HOSPICE | Facility: HOSPICE | Age: 86
End: 2021-07-07
Payer: MEDICARE

## 2021-07-07 VITALS
DIASTOLIC BLOOD PRESSURE: 76 MMHG | TEMPERATURE: 95.1 F | SYSTOLIC BLOOD PRESSURE: 126 MMHG | RESPIRATION RATE: 16 BRPM | HEART RATE: 55 BPM

## 2021-07-07 PROCEDURE — G0299 HHS/HOSPICE OF RN EA 15 MIN: HCPCS

## 2021-07-07 PROCEDURE — 0651 HSPC ROUTINE HOME CARE

## 2021-07-07 PROCEDURE — HOSPICE MEDICATION HC HH HOSPICE MEDICATION

## 2021-07-08 PROCEDURE — 0651 HSPC ROUTINE HOME CARE

## 2021-07-09 ENCOUNTER — HOME CARE VISIT (OUTPATIENT)
Dept: SCHEDULING | Facility: HOME HEALTH | Age: 86
End: 2021-07-09
Payer: MEDICARE

## 2021-07-09 PROCEDURE — G0156 HHCP-SVS OF AIDE,EA 15 MIN: HCPCS

## 2021-07-09 PROCEDURE — 0651 HSPC ROUTINE HOME CARE

## 2021-07-10 PROCEDURE — 0651 HSPC ROUTINE HOME CARE

## 2021-07-11 PROCEDURE — 0651 HSPC ROUTINE HOME CARE

## 2021-07-12 ENCOUNTER — HOME CARE VISIT (OUTPATIENT)
Dept: SCHEDULING | Facility: HOME HEALTH | Age: 86
End: 2021-07-12
Payer: MEDICARE

## 2021-07-12 PROCEDURE — G0156 HHCP-SVS OF AIDE,EA 15 MIN: HCPCS

## 2021-07-12 PROCEDURE — 0651 HSPC ROUTINE HOME CARE

## 2021-07-12 PROCEDURE — G0155 HHCP-SVS OF CSW,EA 15 MIN: HCPCS

## 2021-07-12 PROCEDURE — G0299 HHS/HOSPICE OF RN EA 15 MIN: HCPCS

## 2021-07-13 PROCEDURE — 0651 HSPC ROUTINE HOME CARE

## 2021-07-14 ENCOUNTER — HOME CARE VISIT (OUTPATIENT)
Dept: SCHEDULING | Facility: HOME HEALTH | Age: 86
End: 2021-07-14
Payer: MEDICARE

## 2021-07-14 PROCEDURE — G0156 HHCP-SVS OF AIDE,EA 15 MIN: HCPCS

## 2021-07-14 PROCEDURE — 0651 HSPC ROUTINE HOME CARE

## 2021-07-15 PROCEDURE — 0651 HSPC ROUTINE HOME CARE

## 2021-07-16 ENCOUNTER — HOME CARE VISIT (OUTPATIENT)
Dept: SCHEDULING | Facility: HOME HEALTH | Age: 86
End: 2021-07-16
Payer: MEDICARE

## 2021-07-16 PROCEDURE — G0156 HHCP-SVS OF AIDE,EA 15 MIN: HCPCS

## 2021-07-16 PROCEDURE — 0651 HSPC ROUTINE HOME CARE

## 2021-07-17 PROCEDURE — 0651 HSPC ROUTINE HOME CARE

## 2021-07-18 PROCEDURE — 0651 HSPC ROUTINE HOME CARE

## 2021-07-19 ENCOUNTER — HOME CARE VISIT (OUTPATIENT)
Dept: SCHEDULING | Facility: HOME HEALTH | Age: 86
End: 2021-07-19
Payer: MEDICARE

## 2021-07-19 VITALS
DIASTOLIC BLOOD PRESSURE: 71 MMHG | TEMPERATURE: 98 F | RESPIRATION RATE: 16 BRPM | OXYGEN SATURATION: 90 % | SYSTOLIC BLOOD PRESSURE: 115 MMHG | HEART RATE: 66 BPM

## 2021-07-19 PROCEDURE — G0299 HHS/HOSPICE OF RN EA 15 MIN: HCPCS

## 2021-07-19 PROCEDURE — G0156 HHCP-SVS OF AIDE,EA 15 MIN: HCPCS

## 2021-07-19 PROCEDURE — 0651 HSPC ROUTINE HOME CARE

## 2021-07-19 NOTE — HOSPICE
Pt./ CG screened for COVID-19 Negative  RN assessment completed  Vitals assessed WNL's. Pt. continues to have episodes of anxiety/ pain for which CG uses Roxanol and Ativan as needed. Pt.resting in bed with eyes closed following her bath. Pt. did not speak to us today. Skin warm, dry and intact. Pt. has fungal type rash on Left side of abd and left inner forearm. Cg was using Nystatin cream but I ordered Nystatin powder ordered by Zuleima Self, NP which seems to be slowly improving. Incontinent of B/B. Per CG pt. is urinating without any issues. Changes briefs average of 6 to 8x's. Last BM 7/17/21  Appetite/ Fluid intake is good per PCG. Pt. eats 90%. Drinks plenty of liquids. Cg knows to be careful with pt.  to make sure HOB is elevated at 90 degrees. Use a 10 cc syringe, gently rub pt's throat to encourage her to swallow. Medications reviewed with CG: No refills needed  Supplies needed[de-identified] Briefs, Zinc, Wipes, Chuxs. No falls reported, bed rails up x 2 at all times. Safety precautions are being followed. Pt. is totally dependent for all care. Pt. has PCG 's that stay 12 hrs shifts during the day. Pt. is changed, turned and repositioned freq.

## 2021-07-20 PROCEDURE — 0651 HSPC ROUTINE HOME CARE

## 2021-07-21 ENCOUNTER — HOME CARE VISIT (OUTPATIENT)
Dept: SCHEDULING | Facility: HOME HEALTH | Age: 86
End: 2021-07-21
Payer: MEDICARE

## 2021-07-21 PROCEDURE — G0156 HHCP-SVS OF AIDE,EA 15 MIN: HCPCS

## 2021-07-21 PROCEDURE — 0651 HSPC ROUTINE HOME CARE

## 2021-07-22 PROCEDURE — 0651 HSPC ROUTINE HOME CARE

## 2021-07-23 ENCOUNTER — HOME CARE VISIT (OUTPATIENT)
Dept: SCHEDULING | Facility: HOME HEALTH | Age: 86
End: 2021-07-23
Payer: MEDICARE

## 2021-07-23 PROCEDURE — 0651 HSPC ROUTINE HOME CARE

## 2021-07-23 PROCEDURE — G0156 HHCP-SVS OF AIDE,EA 15 MIN: HCPCS

## 2021-07-24 PROCEDURE — 0651 HSPC ROUTINE HOME CARE

## 2021-07-25 PROCEDURE — 0651 HSPC ROUTINE HOME CARE

## 2021-07-26 ENCOUNTER — HOME CARE VISIT (OUTPATIENT)
Dept: SCHEDULING | Facility: HOME HEALTH | Age: 86
End: 2021-07-26
Payer: MEDICARE

## 2021-07-26 VITALS
DIASTOLIC BLOOD PRESSURE: 74 MMHG | SYSTOLIC BLOOD PRESSURE: 117 MMHG | RESPIRATION RATE: 16 BRPM | HEART RATE: 68 BPM | TEMPERATURE: 97.9 F

## 2021-07-26 PROCEDURE — G0156 HHCP-SVS OF AIDE,EA 15 MIN: HCPCS

## 2021-07-26 PROCEDURE — 0651 HSPC ROUTINE HOME CARE

## 2021-07-26 PROCEDURE — G0299 HHS/HOSPICE OF RN EA 15 MIN: HCPCS

## 2021-07-26 NOTE — HOSPICE
Pt./ CG screened for COVID-19 Negative RN assessment completed for Recertification. Vitals assessed WNL's. Pt. continues to have episodes of anxiety/ pain for which CG uses Roxanol and Ativan as needed. Pt.resting in bed with eyes open. Pt. did not speak today. Skin warm. dry and intact. Pt. has fungal type rash on Left side of abd and left inner forearm which is improving with use of  Nystatin powder. Incontinent of B/B. Per CG pt. is urinating without any issues. Changes briefs average of 6 to 8x's. Last BM 7/26/21 Appetite/ Fluid intake is good per PCG. Pt. eats 90%. Drinks plenty of liquids. Cg knows to be careful with pt. to make sure HOB is elevated at 90 degrees. Use a 10 cc syringe, gently rub pt's throat to encourage her to swallow. Medications reviewed with CG: No refills needed Supplies needed::None No falls reported, bed rails up x 2 at all times. Safety precautions are being followed. Pt. is totally dependent for all care. Pt. has PCG 's that stay 12 hrs shifts during the day. Pt. is changed, turned and repositioned freq.

## 2021-07-27 ENCOUNTER — HOME CARE VISIT (OUTPATIENT)
Dept: HOSPICE | Facility: HOSPICE | Age: 86
End: 2021-07-27
Payer: MEDICARE

## 2021-07-27 PROCEDURE — 0651 HSPC ROUTINE HOME CARE

## 2021-07-28 ENCOUNTER — HOME CARE VISIT (OUTPATIENT)
Dept: SCHEDULING | Facility: HOME HEALTH | Age: 86
End: 2021-07-28
Payer: MEDICARE

## 2021-07-28 PROCEDURE — 0651 HSPC ROUTINE HOME CARE

## 2021-07-28 PROCEDURE — G0156 HHCP-SVS OF AIDE,EA 15 MIN: HCPCS

## 2021-07-29 PROCEDURE — 0651 HSPC ROUTINE HOME CARE

## 2021-07-30 ENCOUNTER — HOME CARE VISIT (OUTPATIENT)
Dept: SCHEDULING | Facility: HOME HEALTH | Age: 86
End: 2021-07-30
Payer: MEDICARE

## 2021-07-30 PROCEDURE — 0651 HSPC ROUTINE HOME CARE

## 2021-07-30 PROCEDURE — G0156 HHCP-SVS OF AIDE,EA 15 MIN: HCPCS

## 2021-07-31 PROCEDURE — 0651 HSPC ROUTINE HOME CARE

## 2021-08-01 ENCOUNTER — HOME CARE VISIT (OUTPATIENT)
Dept: HOSPICE | Facility: HOSPICE | Age: 86
End: 2021-08-01
Payer: MEDICARE

## 2021-08-01 PROCEDURE — 0651 HSPC ROUTINE HOME CARE

## 2021-08-02 ENCOUNTER — HOME CARE VISIT (OUTPATIENT)
Dept: HOSPICE | Facility: HOSPICE | Age: 86
End: 2021-08-02
Payer: MEDICARE

## 2021-08-02 ENCOUNTER — HOME CARE VISIT (OUTPATIENT)
Dept: SCHEDULING | Facility: HOME HEALTH | Age: 86
End: 2021-08-02
Payer: MEDICARE

## 2021-08-02 PROCEDURE — G0299 HHS/HOSPICE OF RN EA 15 MIN: HCPCS

## 2021-08-02 PROCEDURE — 0651 HSPC ROUTINE HOME CARE

## 2021-08-02 PROCEDURE — G0156 HHCP-SVS OF AIDE,EA 15 MIN: HCPCS

## 2021-08-02 PROCEDURE — G0155 HHCP-SVS OF CSW,EA 15 MIN: HCPCS

## 2021-08-02 PROCEDURE — HOSPICE MEDICATION HC HH HOSPICE MEDICATION

## 2021-08-03 VITALS
SYSTOLIC BLOOD PRESSURE: 130 MMHG | RESPIRATION RATE: 16 BRPM | DIASTOLIC BLOOD PRESSURE: 70 MMHG | HEART RATE: 68 BPM | TEMPERATURE: 97.8 F

## 2021-08-03 PROCEDURE — 0651 HSPC ROUTINE HOME CARE

## 2021-08-03 NOTE — HOSPICE
Patient is lying in her hospital bed. She is alert but not very interactive. Her son and hired caregivers are present as well as a hospice RN. MUKUND spoke with a caregiver this am and they have concerns about her having a rash and what it may be. Her spouse has the same rash they say. Stefania Fermin has taken him to the PCP to have his skin looked at . The son, Ava Sapp, is concerned as well. The rn assessed Miss Shawnee Rodriguez and is speaking with the MD and addressing the concern. Ava Sapp did some life review. He and Stefania Fermin have conflict at times but try to work together when needed. Raoul expressed being pleased with his mom\" care from the hired caregivers and [facility]. SW provided active listening and offered support. Hopefully soon the rash can be resolved. No new needs reported.  MUKUND offered availability

## 2021-08-03 NOTE — HOSPICE
Patient is a 80year old patient on [facility] services for ALzheimer's Disease. KPS/PPS 30. FAST 7e. RN comprehensive assessment completed. MUKUND Dyson) and patient's son present. Patient lying in bed upon arrival; resting with eyes open during visit. Patient is primarily non-verbal. Vitals assessed and WNL at present. Patient has a rash to head, neck and lower abdomen; minimal drainage noted with slight crusting. RN consulted Dr. Braulio Álvarez; order for Mupirocin 2% ointment, apply thin layer to affected areas three times daily for 8 days. Medication to be delivered via [place] on 8/3/21; daughter, Eli Bird, notified of new order and anticipated delivery with verbalized understanding. Pt incontinent of B/B with LBM 8/1/21 per private sitter. Per private sitter pt. is urinating without any issues. Pt. continues to eats 90% of meals and drinks plenty of liquids per private sitter. Private sitters aware of need to assure patient elevated at least 45 degrees when eating or drinking to prevent aspiration. Medications reviewed with private sitter. No refills needed at this time. Supplies ordered per private sitter request: chux, L briefs, wipes, pads, and body wash. No falls reported; bed rails up x 2 at all times. Safety precautions are being followed. Pt. is totally dependent for all care and has private sitters that assist in 12 hrs shifts throughout the day. Patient is changed, turned, and repositioned frequently to prevent skin breakdown. RN educated private sitters and PCG to call [facility] 24/7 phone line with any changes, concerns, or falls; verbalized understanding.

## 2021-08-04 ENCOUNTER — HOME CARE VISIT (OUTPATIENT)
Dept: HOSPICE | Facility: HOSPICE | Age: 86
End: 2021-08-04
Payer: MEDICARE

## 2021-08-04 PROCEDURE — 0651 HSPC ROUTINE HOME CARE

## 2021-08-05 PROCEDURE — 0651 HSPC ROUTINE HOME CARE

## 2021-08-06 ENCOUNTER — HOME CARE VISIT (OUTPATIENT)
Dept: HOSPICE | Facility: HOSPICE | Age: 86
End: 2021-08-06
Payer: MEDICARE

## 2021-08-06 PROCEDURE — 0651 HSPC ROUTINE HOME CARE

## 2021-08-07 PROCEDURE — 0651 HSPC ROUTINE HOME CARE

## 2021-08-08 PROCEDURE — 0651 HSPC ROUTINE HOME CARE

## 2021-08-09 ENCOUNTER — HOME CARE VISIT (OUTPATIENT)
Dept: SCHEDULING | Facility: HOME HEALTH | Age: 86
End: 2021-08-09
Payer: MEDICARE

## 2021-08-09 VITALS
RESPIRATION RATE: 16 BRPM | HEART RATE: 72 BPM | DIASTOLIC BLOOD PRESSURE: 81 MMHG | SYSTOLIC BLOOD PRESSURE: 142 MMHG | TEMPERATURE: 97.8 F

## 2021-08-09 PROCEDURE — 0651 HSPC ROUTINE HOME CARE

## 2021-08-09 PROCEDURE — G0299 HHS/HOSPICE OF RN EA 15 MIN: HCPCS

## 2021-08-09 PROCEDURE — G0156 HHCP-SVS OF AIDE,EA 15 MIN: HCPCS

## 2021-08-09 NOTE — HOSPICE
Pt./ CG screened for COVID-19 Negative RN assessment completed  Vitals assessed WNL's. Pt. continues to have episodes of anxiety/ pain for which CG uses Roxanol and Ativan as needed. Pt.resting in bed with eyes open. Pt. did not speak today. but she was aggravated due to getting a bath. Skin warm. dry and intact. Pt. has rash below umbilical area. CG continues to use Bactroban ointment. Incontinent of B/B. Per CG pt. is urinating without any issues. Changes briefs average of 6 to 8x's. Last BM 8/9/21 Appetite/ Fluid intake is good per PCG. Pt. eats 90%. Drinks plenty of liquids. Cg knows to be careful with pt. to make sure HOB is elevated at 90 degrees. Use a 10 cc syringe, gently rub pt's throat to encourage her to swallow. Medications reviewed with CG: No refills needed Supplies needed::None No falls reported, bed rails up x 2 at all times. Safety precautions are being followed. Pt. is totally dependent for all care. Pt. has PCG 's that stay 12 hrs shifts during the day. Pt. is changed, turned and repositioned freq.

## 2021-08-10 PROCEDURE — 0651 HSPC ROUTINE HOME CARE

## 2021-08-11 ENCOUNTER — HOME CARE VISIT (OUTPATIENT)
Dept: SCHEDULING | Facility: HOME HEALTH | Age: 86
End: 2021-08-11
Payer: MEDICARE

## 2021-08-11 PROCEDURE — 0651 HSPC ROUTINE HOME CARE

## 2021-08-11 PROCEDURE — G0156 HHCP-SVS OF AIDE,EA 15 MIN: HCPCS

## 2021-08-12 PROCEDURE — 0651 HSPC ROUTINE HOME CARE

## 2021-08-13 ENCOUNTER — HOME CARE VISIT (OUTPATIENT)
Dept: HOSPICE | Facility: HOSPICE | Age: 86
End: 2021-08-13
Payer: MEDICARE

## 2021-08-13 PROCEDURE — 0651 HSPC ROUTINE HOME CARE

## 2021-08-13 NOTE — CASE COMMUNICATION
Pt's daughter stated her and that the CG have already took care of pt's bath for today no need to come out.

## 2021-08-14 PROCEDURE — 0651 HSPC ROUTINE HOME CARE

## 2021-08-15 PROCEDURE — 0651 HSPC ROUTINE HOME CARE

## 2021-08-16 ENCOUNTER — HOME CARE VISIT (OUTPATIENT)
Dept: SCHEDULING | Facility: HOME HEALTH | Age: 86
End: 2021-08-16
Payer: MEDICARE

## 2021-08-16 VITALS
DIASTOLIC BLOOD PRESSURE: 96 MMHG | TEMPERATURE: 97.5 F | HEART RATE: 57 BPM | OXYGEN SATURATION: 92 % | SYSTOLIC BLOOD PRESSURE: 151 MMHG | RESPIRATION RATE: 18 BRPM

## 2021-08-16 PROCEDURE — G0299 HHS/HOSPICE OF RN EA 15 MIN: HCPCS

## 2021-08-16 PROCEDURE — G0156 HHCP-SVS OF AIDE,EA 15 MIN: HCPCS

## 2021-08-16 PROCEDURE — 0651 HSPC ROUTINE HOME CARE

## 2021-08-16 NOTE — HOSPICE
Pt./ CG screened for COVID-19 Negative RN assessment completed Vitals assessed WNL's B/P 151 /96 pt. aggravated. . Pt. continues to have episodes of anxiety/ pain for which CG uses Roxanol and Ativan as needed. Pt.resting in bed with eyes open eating breakfast. Pt. did nod several times. but she was aggravated due sittin up in bed to eat. Sitter reports this hurts her back. I told her to have the CG to  give her something for pan. Skin warm. dry and intact. Pt. has rash below umbilical area. CG continues to use Bactroban ointment rash has improved. . Incontinent of B/B. Per CG pt. is urinating without any issues. Changes briefs average of 6 to 8x's. Last BM 8/15/21 Appetite/ Fluid intake is good per PCG. Pt. eats 90%. Drinks plenty of liquids. Cg knows to be careful with pt. to make sure HOB is elevated at 90 degrees. Use a 10 cc syringe, gently rub pt's throat to encourage her to swallow. Medications reviewed with CG: No refills needed Supplies: Wipes, Chuxs, Briefs, Inserts, [employer] Cream  DME needed None. No falls reported, bed rails up x 2 at all times. Safety precautions are being followed. Pt. is totally dependent for all care. Pt. has PCG 's that stay 12 hrs shifts during the day. Pt. is changed, turned and repositioned freq.

## 2021-08-17 PROCEDURE — 0651 HSPC ROUTINE HOME CARE

## 2021-08-18 ENCOUNTER — HOME CARE VISIT (OUTPATIENT)
Dept: SCHEDULING | Facility: HOME HEALTH | Age: 86
End: 2021-08-18
Payer: MEDICARE

## 2021-08-18 PROCEDURE — G0156 HHCP-SVS OF AIDE,EA 15 MIN: HCPCS

## 2021-08-18 PROCEDURE — 0651 HSPC ROUTINE HOME CARE

## 2021-08-19 PROCEDURE — 0651 HSPC ROUTINE HOME CARE

## 2021-08-20 ENCOUNTER — HOME CARE VISIT (OUTPATIENT)
Dept: SCHEDULING | Facility: HOME HEALTH | Age: 86
End: 2021-08-20
Payer: MEDICARE

## 2021-08-20 PROCEDURE — G0156 HHCP-SVS OF AIDE,EA 15 MIN: HCPCS

## 2021-08-20 PROCEDURE — 0651 HSPC ROUTINE HOME CARE

## 2021-08-21 PROCEDURE — 0651 HSPC ROUTINE HOME CARE

## 2021-08-22 PROCEDURE — 0651 HSPC ROUTINE HOME CARE

## 2021-08-23 ENCOUNTER — HOME CARE VISIT (OUTPATIENT)
Dept: SCHEDULING | Facility: HOME HEALTH | Age: 86
End: 2021-08-23
Payer: MEDICARE

## 2021-08-23 VITALS
TEMPERATURE: 98.1 F | RESPIRATION RATE: 18 BRPM | SYSTOLIC BLOOD PRESSURE: 148 MMHG | DIASTOLIC BLOOD PRESSURE: 87 MMHG | HEART RATE: 62 BPM

## 2021-08-23 PROCEDURE — 0651 HSPC ROUTINE HOME CARE

## 2021-08-23 PROCEDURE — G0299 HHS/HOSPICE OF RN EA 15 MIN: HCPCS

## 2021-08-23 PROCEDURE — G0156 HHCP-SVS OF AIDE,EA 15 MIN: HCPCS

## 2021-08-23 NOTE — HOSPICE
Pt./ CG screened for COVID-19 Negative RN assessment completed Vitals assessed WNL's B/P 148/87 pt. aggravated. . Pt. continues to have episodes of anxiety/ pain for which CG uses Roxanol and Ativan as needed. Pt.resting in bed with eyes open eating lunch. Pt. did nod several times. but she was aggravated due sittin up in bed to eat. Sitter reports this hurts her back. I told her to have the CG to give her something for pain. Skin warm. dry and intact. Rash below umbilical area is almost gone. Incontinent of B/B. Per CG pt. is urinating without any issues. Changes briefs average of 6 to 8x's. Last BM 8/22/21 Appetite/ Fluid intake is good per PCG. Pt. eats 90%. Drinks plenty of liquids. Cg knows to be careful with pt. to make sure HOB is elevated at 90 degrees. Use a 10 cc syringe, gently rub pt's throat to encourage her to swallow. Medications reviewed with CG: No refills needed Supplies: Zinc cream, Chuxs, DME needed None. No falls reported, bed rails up x 2 at all times. Safety precautions are being followed. Pt. is totally dependent for all care. Pt. has PCG 's that stay 12 hrs shifts during the day. Pt. is changed, turned and repositioned freq.

## 2021-08-24 PROCEDURE — 0651 HSPC ROUTINE HOME CARE

## 2021-08-25 ENCOUNTER — HOME CARE VISIT (OUTPATIENT)
Dept: SCHEDULING | Facility: HOME HEALTH | Age: 86
End: 2021-08-25
Payer: MEDICARE

## 2021-08-25 PROCEDURE — G0156 HHCP-SVS OF AIDE,EA 15 MIN: HCPCS

## 2021-08-25 PROCEDURE — 0651 HSPC ROUTINE HOME CARE

## 2021-08-26 PROCEDURE — 0651 HSPC ROUTINE HOME CARE

## 2021-08-27 ENCOUNTER — HOME CARE VISIT (OUTPATIENT)
Dept: SCHEDULING | Facility: HOME HEALTH | Age: 86
End: 2021-08-27
Payer: MEDICARE

## 2021-08-27 PROCEDURE — G0156 HHCP-SVS OF AIDE,EA 15 MIN: HCPCS

## 2021-08-27 PROCEDURE — 0651 HSPC ROUTINE HOME CARE

## 2021-08-28 PROCEDURE — 0651 HSPC ROUTINE HOME CARE

## 2021-08-29 PROCEDURE — 0651 HSPC ROUTINE HOME CARE

## 2021-08-30 ENCOUNTER — HOME CARE VISIT (OUTPATIENT)
Dept: SCHEDULING | Facility: HOME HEALTH | Age: 86
End: 2021-08-30
Payer: MEDICARE

## 2021-08-30 VITALS
SYSTOLIC BLOOD PRESSURE: 126 MMHG | DIASTOLIC BLOOD PRESSURE: 69 MMHG | RESPIRATION RATE: 16 BRPM | TEMPERATURE: 98 F | HEART RATE: 52 BPM

## 2021-08-30 PROCEDURE — G0299 HHS/HOSPICE OF RN EA 15 MIN: HCPCS

## 2021-08-30 PROCEDURE — 0651 HSPC ROUTINE HOME CARE

## 2021-08-30 PROCEDURE — G0156 HHCP-SVS OF AIDE,EA 15 MIN: HCPCS

## 2021-08-30 NOTE — HOSPICE
Pt./ CG screened for COVID-19 Negative RN assessment completed Vitals assessed WNL's B/P 126/67, P52 pt. aggravated. . Pt. continues to have episodes of anxiety/ pain for which CG uses Roxanol and Ativan as needed. Pt.resting in bed with eyes open eating lunch. Pt. did nod several times. but she was aggravated due sittin up in bed to eat. Sitter reports this hurts her back. I told her to have the CG to give her something for pain. Skin warm. dry and intact. Rash below umbilical area is almost gone. Incontinent of B/B. Per CG pt. is urinating without any issues. Changes briefs average of 6 to 8x's. Last BM 8/30/21 Appetite/ Fluid intake is good per PCG. Pt. eats 90%. Drinks plenty of liquids. Cg knows to be careful with pt. to make sure HOB is elevated at 90 degrees. Use a 10 cc syringe, gently rub pt's throat to encourage her to swallow. Medications reviewed with CG: No refills needed Supplies: Zinc cream, Chuxs, DME needed None. No falls reported, bed rails up x 2 at all times. Safety precautions are being followed. Pt. is totally dependent for all care. Pt. has PCG 's that stay 12 hrs shifts during the day. Pt. is changed, turned and repositioned freq.

## 2021-08-31 PROCEDURE — 0651 HSPC ROUTINE HOME CARE

## 2021-09-01 PROCEDURE — 0651 HSPC ROUTINE HOME CARE

## 2021-09-02 PROCEDURE — 0651 HSPC ROUTINE HOME CARE

## 2021-09-03 ENCOUNTER — HOME CARE VISIT (OUTPATIENT)
Dept: SCHEDULING | Facility: HOME HEALTH | Age: 86
End: 2021-09-03
Payer: MEDICARE

## 2021-09-03 PROCEDURE — 0651 HSPC ROUTINE HOME CARE

## 2021-09-03 PROCEDURE — G0156 HHCP-SVS OF AIDE,EA 15 MIN: HCPCS

## 2021-09-04 PROCEDURE — 0651 HSPC ROUTINE HOME CARE

## 2021-09-05 PROCEDURE — 0651 HSPC ROUTINE HOME CARE

## 2021-09-06 ENCOUNTER — HOME CARE VISIT (OUTPATIENT)
Dept: HOSPICE | Facility: HOSPICE | Age: 86
End: 2021-09-06
Payer: MEDICARE

## 2021-09-06 PROCEDURE — 0651 HSPC ROUTINE HOME CARE

## 2021-09-07 PROCEDURE — 0651 HSPC ROUTINE HOME CARE

## 2021-09-08 ENCOUNTER — HOME CARE VISIT (OUTPATIENT)
Dept: SCHEDULING | Facility: HOME HEALTH | Age: 86
End: 2021-09-08
Payer: MEDICARE

## 2021-09-08 VITALS
DIASTOLIC BLOOD PRESSURE: 83 MMHG | RESPIRATION RATE: 16 BRPM | OXYGEN SATURATION: 99 % | SYSTOLIC BLOOD PRESSURE: 138 MMHG | HEART RATE: 65 BPM | TEMPERATURE: 98.8 F

## 2021-09-08 PROCEDURE — 0651 HSPC ROUTINE HOME CARE

## 2021-09-08 PROCEDURE — G0299 HHS/HOSPICE OF RN EA 15 MIN: HCPCS

## 2021-09-08 NOTE — HOSPICE
Pt./ CG screened for COVID-19 Negative RN assessment completed Vitals assessed WNL's  pt. resting on her right side. Pt. continues to have episodes of anxiety/ pain for which CG uses Roxanol and Ativan as needed S.kin warm. dry and intact. Incontinent of B/B. Per CG pt. is urinating without any issues. Changes briefs average of 6 to 8x's. Last BM 9/7/21 Appetite/ Fluid intake is good per PCG. Pt. eats 90%. Drinks plenty of liquids. Cg knows to be careful with pt. to make sure HOB is elevated at 90 degrees. Use a 10 cc syringe, gently rub pt's throat to encourage her to swallow. Medications reviewed with CG: No refills needed Supplies:wipes, Chuxs, DME needed None. No falls reported, bed rails up x 2 at all times. Safety precautions are being followed. Pt. is totally dependent for all care. Pt. has PCG 's that stay 12 hrs shifts during the day. Pt. is changed, turned and repositioned freq.

## 2021-09-09 PROCEDURE — 0651 HSPC ROUTINE HOME CARE

## 2021-09-10 ENCOUNTER — HOME CARE VISIT (OUTPATIENT)
Dept: SCHEDULING | Facility: HOME HEALTH | Age: 86
End: 2021-09-10
Payer: MEDICARE

## 2021-09-10 PROCEDURE — G0156 HHCP-SVS OF AIDE,EA 15 MIN: HCPCS

## 2021-09-10 PROCEDURE — 0651 HSPC ROUTINE HOME CARE

## 2021-09-11 PROCEDURE — 0651 HSPC ROUTINE HOME CARE

## 2021-09-12 PROCEDURE — 0651 HSPC ROUTINE HOME CARE

## 2021-09-13 ENCOUNTER — HOME CARE VISIT (OUTPATIENT)
Dept: SCHEDULING | Facility: HOME HEALTH | Age: 86
End: 2021-09-13
Payer: MEDICARE

## 2021-09-13 VITALS
SYSTOLIC BLOOD PRESSURE: 133 MMHG | DIASTOLIC BLOOD PRESSURE: 81 MMHG | HEART RATE: 60 BPM | RESPIRATION RATE: 18 BRPM | TEMPERATURE: 97.7 F

## 2021-09-13 PROCEDURE — G0156 HHCP-SVS OF AIDE,EA 15 MIN: HCPCS

## 2021-09-13 PROCEDURE — HOSPICE MEDICATION HC HH HOSPICE MEDICATION

## 2021-09-13 PROCEDURE — G0299 HHS/HOSPICE OF RN EA 15 MIN: HCPCS

## 2021-09-13 PROCEDURE — 0651 HSPC ROUTINE HOME CARE

## 2021-09-13 NOTE — HOSPICE
Pt./ CG screened for COVID-19 Negative RN assessment completed Vitals assessed WNL's pt. resting on back. Pt. continues to have episodes of anxiety/ pain for which CG uses Roxanol and Ativan as needed  I did med reviewed with PCG. Pt. will need new bottle of Ativan as current bottle has . Skin warm. dry and intact. Incontinent of B/B. Per CG pt. is urinating without any issues. Changes briefs average of 6 to 8x's. Last BM 21 Appetite/ Fluid intake is good per PCG. Pt. eats 90%. Drinks plenty of liquids. Cg knows to be careful with pt. to make sure HOB is elevated at 90 degrees. Use a 10 cc syringe, gently rub pt's throat to encourage her to swallow. Medications reviewed with CG: Ativan , Dulcolax supp. Supplies:wipes, Chuxs, Briefs DME needed None. No falls reported, bed rails up x 2 at all times. Safety precautions are being followed. Pt. is totally dependent for all care. Pt. has PCG 's that stay 12 hrs shifts during the day. Pt. is changed, turned and repositioned freq.

## 2021-09-14 PROCEDURE — 0651 HSPC ROUTINE HOME CARE

## 2021-09-15 PROCEDURE — 0651 HSPC ROUTINE HOME CARE

## 2021-09-16 PROCEDURE — 0651 HSPC ROUTINE HOME CARE

## 2021-09-17 ENCOUNTER — HOME CARE VISIT (OUTPATIENT)
Dept: SCHEDULING | Facility: HOME HEALTH | Age: 86
End: 2021-09-17
Payer: MEDICARE

## 2021-09-17 PROCEDURE — 0651 HSPC ROUTINE HOME CARE

## 2021-09-17 PROCEDURE — G0156 HHCP-SVS OF AIDE,EA 15 MIN: HCPCS

## 2021-09-18 PROCEDURE — 0651 HSPC ROUTINE HOME CARE

## 2021-09-19 PROCEDURE — 0651 HSPC ROUTINE HOME CARE

## 2021-09-20 ENCOUNTER — HOME CARE VISIT (OUTPATIENT)
Dept: SCHEDULING | Facility: HOME HEALTH | Age: 86
End: 2021-09-20
Payer: MEDICARE

## 2021-09-20 PROCEDURE — G0156 HHCP-SVS OF AIDE,EA 15 MIN: HCPCS

## 2021-09-20 PROCEDURE — 0651 HSPC ROUTINE HOME CARE

## 2021-09-21 ENCOUNTER — HOME CARE VISIT (OUTPATIENT)
Dept: SCHEDULING | Facility: HOME HEALTH | Age: 86
End: 2021-09-21
Payer: MEDICARE

## 2021-09-21 ENCOUNTER — HOME CARE VISIT (OUTPATIENT)
Dept: HOSPICE | Facility: HOSPICE | Age: 86
End: 2021-09-21
Payer: MEDICARE

## 2021-09-21 VITALS
DIASTOLIC BLOOD PRESSURE: 74 MMHG | HEART RATE: 84 BPM | TEMPERATURE: 98 F | RESPIRATION RATE: 16 BRPM | SYSTOLIC BLOOD PRESSURE: 120 MMHG

## 2021-09-21 PROCEDURE — G0155 HHCP-SVS OF CSW,EA 15 MIN: HCPCS

## 2021-09-21 PROCEDURE — 0651 HSPC ROUTINE HOME CARE

## 2021-09-21 PROCEDURE — G0299 HHS/HOSPICE OF RN EA 15 MIN: HCPCS

## 2021-09-21 NOTE — HOSPICE
Supplies ordered: L briefs, 2 underpads, wet wipes. No meds needed. Pt lying in bed with eyes open, does not focalize or respond to verbal stimulation. CG states pt will speak occasionally. During visit pt shuts eyes and sleeps. No skin breakdown. CG states pt eats 3 meals per day. Thickened liquids given. Regular daily BMs. Pt is not taking pain meds, appears comfortable with no nonverbal signs of pain. Reminded CG to call if any concerns.

## 2021-09-22 PROCEDURE — 0651 HSPC ROUTINE HOME CARE

## 2021-09-22 NOTE — HOSPICE
Warner Blanco is alert but not interacting today. She will not even look your way. She does not show signs of discomfort. Jennifer Ro is in the living room watching TV. He asked if we were checking in on \"moma\". He says he has had some decline and increased fatigue. Emile Bustamante is not in the home today. Their hired caregiver, Kaycee, is with her today. She reports no new needs or changes. Miss Warner Blanco is quiet today. Kaycee reports coping well.   SW offered emotiona support and active listening

## 2021-09-23 PROCEDURE — 0651 HSPC ROUTINE HOME CARE

## 2021-09-24 ENCOUNTER — HOME CARE VISIT (OUTPATIENT)
Dept: SCHEDULING | Facility: HOME HEALTH | Age: 86
End: 2021-09-24
Payer: MEDICARE

## 2021-09-24 PROCEDURE — G0156 HHCP-SVS OF AIDE,EA 15 MIN: HCPCS

## 2021-09-24 PROCEDURE — 0651 HSPC ROUTINE HOME CARE

## 2021-09-25 PROCEDURE — 0651 HSPC ROUTINE HOME CARE

## 2021-09-26 PROCEDURE — 0651 HSPC ROUTINE HOME CARE

## 2021-09-27 ENCOUNTER — HOME CARE VISIT (OUTPATIENT)
Dept: SCHEDULING | Facility: HOME HEALTH | Age: 86
End: 2021-09-27
Payer: MEDICARE

## 2021-09-27 ENCOUNTER — HOME CARE VISIT (OUTPATIENT)
Dept: HOSPICE | Facility: HOSPICE | Age: 86
End: 2021-09-27
Payer: MEDICARE

## 2021-09-27 VITALS
HEART RATE: 60 BPM | SYSTOLIC BLOOD PRESSURE: 142 MMHG | TEMPERATURE: 97.6 F | DIASTOLIC BLOOD PRESSURE: 76 MMHG | RESPIRATION RATE: 18 BRPM

## 2021-09-27 PROCEDURE — G0299 HHS/HOSPICE OF RN EA 15 MIN: HCPCS

## 2021-09-27 PROCEDURE — 0651 HSPC ROUTINE HOME CARE

## 2021-09-27 NOTE — HOSPICE
Pt./ CG screened for COVID-19 Negative RN assessment completed Vitals assessed WNL's pt. resting on back. Pt. continues to have episodes of anxiety/ pain for which CG uses Roxanol and Ativan as needed. Meds reviewed with daughter Raina Pelaez. I wasted old bottle of Ativan as per protocol and completed a waste med form. New bottle of Ativan in frig. Skin warm dry and intact. Incontinent of B/B. Per CG pt. is urinating without any issues. Changes briefs average of 6 to 8x's. Last BM 9/27/21 Appetite/ Fluid intake is good per PCG. Pt. eats 90%. Drinks plenty of liquids. Cg knows to be careful with pt. to make sure HOB is elevated at 90 degrees. Use a 10 cc syringe, gently rub pt's throat to encourage her to swallow. Medications reviewed with CG: Ativan , Dulcolax supp. Supplies:wipes, Chuxs,  DME needed None. No falls reported, bed rails up x 2 at all times. Safety precautions are being followed. Pt. is totally dependent for all care. Pt. has PCG 's that stay 12 hrs shifts during the day. Pt. is changed, turned and repositioned freq.

## 2021-09-28 PROCEDURE — 0651 HSPC ROUTINE HOME CARE

## 2021-09-29 PROCEDURE — 0651 HSPC ROUTINE HOME CARE

## 2021-09-30 PROCEDURE — 0651 HSPC ROUTINE HOME CARE

## 2021-10-01 ENCOUNTER — HOME CARE VISIT (OUTPATIENT)
Dept: SCHEDULING | Facility: HOME HEALTH | Age: 86
End: 2021-10-01
Payer: MEDICARE

## 2021-10-01 PROCEDURE — 0651 HSPC ROUTINE HOME CARE

## 2021-10-01 PROCEDURE — G0156 HHCP-SVS OF AIDE,EA 15 MIN: HCPCS

## 2021-10-02 PROCEDURE — 0651 HSPC ROUTINE HOME CARE

## 2021-10-03 PROCEDURE — 0651 HSPC ROUTINE HOME CARE

## 2021-10-04 ENCOUNTER — HOME CARE VISIT (OUTPATIENT)
Dept: SCHEDULING | Facility: HOME HEALTH | Age: 86
End: 2021-10-04
Payer: MEDICARE

## 2021-10-04 VITALS
OXYGEN SATURATION: 99 % | SYSTOLIC BLOOD PRESSURE: 121 MMHG | DIASTOLIC BLOOD PRESSURE: 78 MMHG | RESPIRATION RATE: 18 BRPM | TEMPERATURE: 98.2 F | HEART RATE: 50 BPM

## 2021-10-04 PROCEDURE — G0299 HHS/HOSPICE OF RN EA 15 MIN: HCPCS

## 2021-10-04 PROCEDURE — G0156 HHCP-SVS OF AIDE,EA 15 MIN: HCPCS

## 2021-10-04 PROCEDURE — 0651 HSPC ROUTINE HOME CARE

## 2021-10-04 NOTE — HOSPICE
Pt./ CG screened for COVID-19 Negative RN assessment completed Vitals assessed WNL's pt. resting left side. Pt. continues to have episodes of anxiety/ pain for which CG uses Roxanol and Ativan as needed. Skin warm dry and intact. Incontinent of B/B. Per CG pt. is urinating without any issues. Changes briefs average of 6 to 8x's. Last BM 10/2/21 Appetite/ Fluid intake is good per PCG. Pt. eats 90%. Drinks plenty of liquids. Cg knows to be careful with pt. to make sure HOB is elevated at 90 degrees. Use a 10 cc syringe, gently rub pt's throat to encourage her to swallow. Medications reviewed with CG: Ativan , Dulcolax supp. Supplies:wipes, Chuxs, DME needed None. No falls reported, bed rails up x 2 at all times. Safety precautions are being followed. Pt. is totally dependent for all care. Pt. has PCG 's that stay 12 hrs shifts during the day. Pt. is changed, turned and repositioned freq.

## 2021-10-05 PROCEDURE — 0651 HSPC ROUTINE HOME CARE

## 2021-10-06 PROCEDURE — 0651 HSPC ROUTINE HOME CARE

## 2021-10-07 PROCEDURE — 0651 HSPC ROUTINE HOME CARE

## 2021-10-08 ENCOUNTER — HOME CARE VISIT (OUTPATIENT)
Dept: SCHEDULING | Facility: HOME HEALTH | Age: 86
End: 2021-10-08
Payer: MEDICARE

## 2021-10-08 PROCEDURE — 0651 HSPC ROUTINE HOME CARE

## 2021-10-08 PROCEDURE — G0156 HHCP-SVS OF AIDE,EA 15 MIN: HCPCS

## 2021-10-09 PROCEDURE — 0651 HSPC ROUTINE HOME CARE

## 2021-10-10 PROCEDURE — 0651 HSPC ROUTINE HOME CARE

## 2021-10-11 ENCOUNTER — HOME CARE VISIT (OUTPATIENT)
Dept: SCHEDULING | Facility: HOME HEALTH | Age: 86
End: 2021-10-11
Payer: MEDICARE

## 2021-10-11 VITALS
OXYGEN SATURATION: 98 % | SYSTOLIC BLOOD PRESSURE: 139 MMHG | DIASTOLIC BLOOD PRESSURE: 81 MMHG | HEART RATE: 59 BPM | RESPIRATION RATE: 16 BRPM | TEMPERATURE: 97.9 F

## 2021-10-11 PROCEDURE — G0299 HHS/HOSPICE OF RN EA 15 MIN: HCPCS

## 2021-10-11 PROCEDURE — 0651 HSPC ROUTINE HOME CARE

## 2021-10-11 PROCEDURE — G0156 HHCP-SVS OF AIDE,EA 15 MIN: HCPCS

## 2021-10-11 NOTE — HOSPICE
Pt./ CG screened for COVID-19 Negative RN assessment completed Vitals assessed WNL's pt. resting on her Back but HOB elevated at 45 degrees. HHA arrived to geive her bath while I was present. Pt. continues to have episodes of anxiety/ pain for which CG uses Roxanol and Ativan as needed. CG reports pt. was agitated all day yesterday. Skin warm dry and intact. Incontinent of B/B. Per CG pt. is urinating without any issues. Changes briefs average of 6 to 8x's. Last BM 10/10/21 Appetite/ Fluid intake is good per PCG. Pt. eats 90%. Drinks plenty of liquids. Cg knows to be careful with pt. to make sure HOB is elevated at 90 degrees. Use a 10 cc syringe, gently rub pt's throat to encourage her to swallow. Medications reviewed with CG:  No RF needed Supplies: [place], Chuxs, DME needed None. No falls reported, bed rails up x 2 at all times. Safety precautions are being followed. Pt. is totally dependent for all care. Pt. has PCG 's that stay 12 hrs shifts during the day. Pt. is changed, turned and repositioned freq.

## 2021-10-12 PROCEDURE — 0651 HSPC ROUTINE HOME CARE

## 2021-10-13 PROCEDURE — 0651 HSPC ROUTINE HOME CARE

## 2021-10-14 PROCEDURE — 0651 HSPC ROUTINE HOME CARE

## 2021-10-15 ENCOUNTER — HOME CARE VISIT (OUTPATIENT)
Dept: SCHEDULING | Facility: HOME HEALTH | Age: 86
End: 2021-10-15
Payer: MEDICARE

## 2021-10-15 PROCEDURE — 0651 HSPC ROUTINE HOME CARE

## 2021-10-15 PROCEDURE — G0156 HHCP-SVS OF AIDE,EA 15 MIN: HCPCS

## 2021-10-16 PROCEDURE — 0651 HSPC ROUTINE HOME CARE

## 2021-10-17 PROCEDURE — 0651 HSPC ROUTINE HOME CARE

## 2021-10-18 ENCOUNTER — HOME CARE VISIT (OUTPATIENT)
Dept: SCHEDULING | Facility: HOME HEALTH | Age: 86
End: 2021-10-18
Payer: MEDICARE

## 2021-10-18 VITALS
RESPIRATION RATE: 18 BRPM | SYSTOLIC BLOOD PRESSURE: 138 MMHG | HEART RATE: 55 BPM | DIASTOLIC BLOOD PRESSURE: 84 MMHG | OXYGEN SATURATION: 95 % | TEMPERATURE: 97.9 F

## 2021-10-18 PROCEDURE — 0651 HSPC ROUTINE HOME CARE

## 2021-10-18 PROCEDURE — G0156 HHCP-SVS OF AIDE,EA 15 MIN: HCPCS

## 2021-10-18 PROCEDURE — G0299 HHS/HOSPICE OF RN EA 15 MIN: HCPCS

## 2021-10-18 NOTE — HOSPICE
Pt./ CG screened for COVID-19 Negative RN assessment completed Vitals assessed WNL's pt. resting on her back but HOB elevated at 45 degrees. HHA arrived to geive her bath while I was present. Pt. continues to have episodes of anxiety/ pain for which CG uses Roxanol and Ativan as needed. CG reports pt. was agitated all day yesterday. Skin warm dry and intact. Incontinent of B/B. Per CG pt. is urinating without any issues. Changes briefs average of 6 to 8x's. Last BM 10/17/21 Appetite/ Fluid intake is good per PCG. Pt. eats 90%. Drinks plenty of liquids. Cg knows to be careful with pt. to make sure HOB is elevated at 90 degrees. Use a 10 cc syringe, gently rub pt's throat to encourage her to swallow. Medications reviewed with CG: No RF needed Supplies:  Chuxs, Zinc Cream, Wipes DME needed None. No falls reported, bed rails up x 2 at all times. Safety precautions are being followed. Pt. is totally dependent for all care. Pt. has PCG 's that stay 12 hrs shifts during the day. Pt. is changed, turned and repositioned freq.

## 2021-10-19 PROCEDURE — 0651 HSPC ROUTINE HOME CARE

## 2021-10-20 PROCEDURE — 0651 HSPC ROUTINE HOME CARE

## 2021-10-21 ENCOUNTER — HOME CARE VISIT (OUTPATIENT)
Dept: HOSPICE | Facility: HOSPICE | Age: 86
End: 2021-10-21
Payer: MEDICARE

## 2021-10-21 PROCEDURE — 0651 HSPC ROUTINE HOME CARE

## 2021-10-21 PROCEDURE — G0155 HHCP-SVS OF CSW,EA 15 MIN: HCPCS

## 2021-10-22 ENCOUNTER — HOME CARE VISIT (OUTPATIENT)
Dept: SCHEDULING | Facility: HOME HEALTH | Age: 86
End: 2021-10-22
Payer: MEDICARE

## 2021-10-22 PROCEDURE — G0156 HHCP-SVS OF AIDE,EA 15 MIN: HCPCS

## 2021-10-22 PROCEDURE — 0651 HSPC ROUTINE HOME CARE

## 2021-10-23 PROCEDURE — 0651 HSPC ROUTINE HOME CARE

## 2021-10-24 PROCEDURE — 0651 HSPC ROUTINE HOME CARE

## 2021-10-25 ENCOUNTER — HOME CARE VISIT (OUTPATIENT)
Dept: SCHEDULING | Facility: HOME HEALTH | Age: 86
End: 2021-10-25
Payer: MEDICARE

## 2021-10-25 VITALS
DIASTOLIC BLOOD PRESSURE: 84 MMHG | HEART RATE: 55 BPM | OXYGEN SATURATION: 98 % | SYSTOLIC BLOOD PRESSURE: 142 MMHG | TEMPERATURE: 96.7 F | RESPIRATION RATE: 20 BRPM

## 2021-10-25 PROCEDURE — G0156 HHCP-SVS OF AIDE,EA 15 MIN: HCPCS

## 2021-10-25 PROCEDURE — 0651 HSPC ROUTINE HOME CARE

## 2021-10-25 PROCEDURE — G0299 HHS/HOSPICE OF RN EA 15 MIN: HCPCS

## 2021-10-25 NOTE — HOSPICE
Pt./ CG screened for COVID-19 Negative RN assessment completed Vitals assessed WNL's pt. resting on her back but HOB elevated at 45 degrees. Pt. continues to have episodes of anxiety/ pain for which CG uses Roxanol and Ativan as needed. Pt. was agitated today and seemed to be in pain. I administered 2.5 mg. of Roxanol  SL to see if this helps. They had already given Tylenol. Skin warm dry and intact. Incontinent of B/B. Per CG pt. is urinating without any issues. Changes briefs average of 6 to 8x's. Last BM 10/24/21 Appetite/ Fluid intake is good per PCG. Pt. eats 90%. Drinks plenty of liquids. Cg knows to be careful with pt. to make sure HOB is elevated at 90 degrees. Use a 10 cc syringe, gently rub pt's throat to encourage her to swallow. Medications reviewed with CG: No RF needed Supplies: None  DME needed None. No falls reported, bed rails up x 2 at all times. Safety precautions are being followed. Pt. is totally dependent for all care. Pt. has PCG 's that stay 12 hrs shifts during the day. Pt. is changed, turned and repositioned freq.

## 2021-10-25 NOTE — HOSPICE
Jaclyn Asher slept through most of the visit. She is rousable but quickly drifts back off to sleep. MUKUND met with Maranda Gil and Donald Michele. Arashsamson Alstoner is fairly animated today and fairly talkative. He says he feels he is loosing ground with his health concerns and mobility but continues to try. Maranda Gil is talkative and expressive. She denies any new needs and reports coping well. They are realistic about Prachi's status and try to enjoy her as possible. Her sitter, Samantha Harp, is present today. SW offered emotional support and active listening. SW offered ongoing availability.

## 2021-10-26 PROCEDURE — 0651 HSPC ROUTINE HOME CARE

## 2021-10-27 PROCEDURE — 0651 HSPC ROUTINE HOME CARE

## 2021-10-28 PROCEDURE — 0651 HSPC ROUTINE HOME CARE

## 2021-10-29 ENCOUNTER — HOME CARE VISIT (OUTPATIENT)
Dept: SCHEDULING | Facility: HOME HEALTH | Age: 86
End: 2021-10-29
Payer: MEDICARE

## 2021-10-29 PROCEDURE — G0156 HHCP-SVS OF AIDE,EA 15 MIN: HCPCS

## 2021-10-29 PROCEDURE — 0651 HSPC ROUTINE HOME CARE

## 2021-10-30 ENCOUNTER — HOME CARE VISIT (OUTPATIENT)
Dept: HOSPICE | Facility: HOSPICE | Age: 86
End: 2021-10-30
Payer: MEDICARE

## 2021-10-30 PROCEDURE — 0651 HSPC ROUTINE HOME CARE

## 2021-10-30 NOTE — CASE COMMUNICATION
Left voicemail for dtr Alex Chauhan offer in to visit on Sunday, 10/31/21, asking for return call to accept offer and stating that if  does not hear from dtr, he will assume that they would prefer a visit during the coming week.

## 2021-10-31 PROCEDURE — 0651 HSPC ROUTINE HOME CARE

## 2021-11-01 ENCOUNTER — HOME CARE VISIT (OUTPATIENT)
Dept: SCHEDULING | Facility: HOME HEALTH | Age: 86
End: 2021-11-01
Payer: MEDICARE

## 2021-11-01 VITALS
TEMPERATURE: 97.2 F | DIASTOLIC BLOOD PRESSURE: 80 MMHG | RESPIRATION RATE: 16 BRPM | HEART RATE: 53 BPM | SYSTOLIC BLOOD PRESSURE: 120 MMHG | OXYGEN SATURATION: 99 %

## 2021-11-01 PROCEDURE — G0299 HHS/HOSPICE OF RN EA 15 MIN: HCPCS

## 2021-11-01 PROCEDURE — 0651 HSPC ROUTINE HOME CARE

## 2021-11-01 PROCEDURE — G0156 HHCP-SVS OF AIDE,EA 15 MIN: HCPCS

## 2021-11-01 NOTE — HOSPICE
Pt./ CG screened for COVID-19 Negative RN assessment completed Vitals assessed WNL's pt. resting on her back but HOB elevated at 45 degrees. Pt. continues to have episodes of anxiety/ pain for which CG uses Roxanol and Ativan as needed. Pt. was agitated today and seemed to be in pain. Skin warm dry and intact. Incontinent of B/B. Per CG pt. is urinating without any issues. Changes briefs average of 6 to 8x's. Last BM 10/31/21 Appetite/ Fluid intake is good per PCG. Pt. eats 90%. Drinks plenty of liquids. Cg knows to be careful with pt. to make sure HOB is elevated at 90 degrees. Use a 10 cc syringe, gently rub pt's throat to encourage her to swallow. Medications reviewed with CG: CG requested new script for Nystatin powder to put on left inner forearm rash. Supplies:ordered DME needed None. No falls reported, bed rails up x 2 at all times. Safety precautions are being followed. Pt. is totally dependent for all care. Pt. has PCG 's that stay 12 hrs shifts during the day. Pt. is changed, turned and repositioned freq.

## 2021-11-02 PROCEDURE — 0651 HSPC ROUTINE HOME CARE

## 2021-11-02 PROCEDURE — HOSPICE MEDICATION HC HH HOSPICE MEDICATION

## 2021-11-03 ENCOUNTER — HOME CARE VISIT (OUTPATIENT)
Dept: SCHEDULING | Facility: HOME HEALTH | Age: 86
End: 2021-11-03
Payer: MEDICARE

## 2021-11-03 PROCEDURE — G0156 HHCP-SVS OF AIDE,EA 15 MIN: HCPCS

## 2021-11-03 PROCEDURE — 0651 HSPC ROUTINE HOME CARE

## 2021-11-04 PROCEDURE — 0651 HSPC ROUTINE HOME CARE

## 2021-11-05 ENCOUNTER — HOME CARE VISIT (OUTPATIENT)
Dept: SCHEDULING | Facility: HOME HEALTH | Age: 86
End: 2021-11-05
Payer: MEDICARE

## 2021-11-05 PROCEDURE — G0156 HHCP-SVS OF AIDE,EA 15 MIN: HCPCS

## 2021-11-05 PROCEDURE — 0651 HSPC ROUTINE HOME CARE

## 2021-11-06 PROCEDURE — 0651 HSPC ROUTINE HOME CARE

## 2021-11-07 PROCEDURE — 0651 HSPC ROUTINE HOME CARE

## 2021-11-08 ENCOUNTER — HOME CARE VISIT (OUTPATIENT)
Dept: SCHEDULING | Facility: HOME HEALTH | Age: 86
End: 2021-11-08
Payer: MEDICARE

## 2021-11-08 VITALS
OXYGEN SATURATION: 99 % | TEMPERATURE: 98 F | HEART RATE: 65 BPM | RESPIRATION RATE: 18 BRPM | DIASTOLIC BLOOD PRESSURE: 89 MMHG | SYSTOLIC BLOOD PRESSURE: 146 MMHG

## 2021-11-08 PROCEDURE — G0156 HHCP-SVS OF AIDE,EA 15 MIN: HCPCS

## 2021-11-08 PROCEDURE — 0651 HSPC ROUTINE HOME CARE

## 2021-11-08 PROCEDURE — G0299 HHS/HOSPICE OF RN EA 15 MIN: HCPCS

## 2021-11-08 NOTE — HOSPICE
Pt./ CG screened for COVID-19 Negative RN assessment completed Vitals assessed WNL's pt. resting on her back but HOB elevated at 45 degrees. Pt. continues to have episodes of anxiety/ pain for which CG uses Roxanol and Ativan as needed. Pt. was agitated today and seemed to be in pain. Skin warm dry and intact. Incontinent of B/B. Per CG pt. is urinating without any issues. Changes briefs average of 6 to 8x's. Last BM 11/7/21 Appetite/ Fluid intake is good per PCG. Pt. eats 90%. Drinks plenty of liquids. Cg knows to be careful with pt. to make sure HOB is elevated at 90 degrees. Use a 10 cc syringe, gently rub pt's throat to encourage her to swallow. Medications reviewed with CG: CG requested new script for Nystatin powder to put on left inner forearm rash, powder seems to be helping. . Supplies:ordered DME needed None. No falls reported, bed rails up x 2 at all times. Safety precautions are being followed. Pt. is totally dependent for all care. Pt. has PCG 's that stay 12 hrs shifts during the day. Pt. is changed, turned and repositioned freq. RN noted that left eye was pupil was turned inward toward pt's nose but will return to normal. Daughter had sent pics to pt's eye MD, Dr. 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 Tani Hartman aware. Hammad Medley will actually be making Recertification visit later this week.

## 2021-11-09 PROCEDURE — 0651 HSPC ROUTINE HOME CARE

## 2021-11-10 ENCOUNTER — HOME CARE VISIT (OUTPATIENT)
Dept: SCHEDULING | Facility: HOME HEALTH | Age: 86
End: 2021-11-10
Payer: MEDICARE

## 2021-11-10 PROCEDURE — G0156 HHCP-SVS OF AIDE,EA 15 MIN: HCPCS

## 2021-11-10 PROCEDURE — 0651 HSPC ROUTINE HOME CARE

## 2021-11-11 PROCEDURE — 0651 HSPC ROUTINE HOME CARE

## 2021-11-12 ENCOUNTER — HOME CARE VISIT (OUTPATIENT)
Dept: SCHEDULING | Facility: HOME HEALTH | Age: 86
End: 2021-11-12
Payer: MEDICARE

## 2021-11-12 PROCEDURE — 0651 HSPC ROUTINE HOME CARE

## 2021-11-12 PROCEDURE — G0156 HHCP-SVS OF AIDE,EA 15 MIN: HCPCS

## 2021-11-13 PROCEDURE — 0651 HSPC ROUTINE HOME CARE

## 2021-11-14 PROCEDURE — 0651 HSPC ROUTINE HOME CARE

## 2021-11-15 ENCOUNTER — HOME CARE VISIT (OUTPATIENT)
Dept: SCHEDULING | Facility: HOME HEALTH | Age: 86
End: 2021-11-15
Payer: MEDICARE

## 2021-11-15 ENCOUNTER — HOME CARE VISIT (OUTPATIENT)
Dept: HOSPICE | Facility: HOSPICE | Age: 86
End: 2021-11-15
Payer: MEDICARE

## 2021-11-15 VITALS
OXYGEN SATURATION: 94 % | SYSTOLIC BLOOD PRESSURE: 146 MMHG | HEART RATE: 70 BPM | DIASTOLIC BLOOD PRESSURE: 89 MMHG | RESPIRATION RATE: 18 BRPM | TEMPERATURE: 98 F

## 2021-11-15 PROCEDURE — G0156 HHCP-SVS OF AIDE,EA 15 MIN: HCPCS

## 2021-11-15 PROCEDURE — G0155 HHCP-SVS OF CSW,EA 15 MIN: HCPCS

## 2021-11-15 PROCEDURE — G0299 HHS/HOSPICE OF RN EA 15 MIN: HCPCS

## 2021-11-15 PROCEDURE — 0651 HSPC ROUTINE HOME CARE

## 2021-11-16 PROCEDURE — 0651 HSPC ROUTINE HOME CARE

## 2021-11-16 NOTE — HOSPICE
Olivia Guidry is having her bath completed upon arrival.  She is agitated a bit but soon calmed. Juliana Sandra and Gary Mcbride were eating \"lunch\" in the kitchen area. Arvalerias Rae says he has been coping fairly well with his sister's death. They recently had her \"celebration\" and he could attend. He requires more and more assistance. Olivia Guidry, however, seems to maintain her current status. She eats  and is well cared for by her hired caregivers and dtr., Gary Mcbride. She sykes reportedly interact with the family at times. Anastacios Rae denies any new needs and reports coping well. She provides optimal care. Her hired caregivers have been in their roles for some time now as well. There are no changes in patient's care needs at this point.   provides emotional support to spouse and dtr and offers availability

## 2021-11-16 NOTE — HOSPICE
Pt./ CG screened for COVID-19 Negative RN assessment completed Vitals assessed WNL's pt. resting on her back but HOB elevated at 45 degrees. Pt. continues to have episodes of anxiety/ pain for which CG uses Roxanol and Ativan as needed. Pt. was agitated today and seemed to be in pain. Skin warm dry and intact. Incontinent of B/B. Per CG pt. is urinating without any issues. Changes briefs average of 6 to 8x's. Last BM 11/14/21 Appetite/ Fluid intake is good per PCG. Pt. eats 90%. Drinks plenty of liquids. Cg knows to be careful with pt. to make sure HOB is elevated at 90 degrees. Use a 10 cc syringe, gently rub pt's throat to encourage her to swallow. Medications reviewed with CG: Nystatin powder to  left inner forearm rash, powder seems to be helping. . Supplies:ordered DME needed None. No falls reported, bed rails up x 2 at all times. Safety precautions are being followed. Pt. is totally dependent for all care. Pt. has PCG 's that stay 12 hrs shifts during the day. Pt. is changed, turned and repositioned freq.  RN noted that left eye was pupil was turned inward toward pt's nose but will return to normal.

## 2021-11-17 ENCOUNTER — HOME CARE VISIT (OUTPATIENT)
Dept: SCHEDULING | Facility: HOME HEALTH | Age: 86
End: 2021-11-17
Payer: MEDICARE

## 2021-11-17 PROCEDURE — G0156 HHCP-SVS OF AIDE,EA 15 MIN: HCPCS

## 2021-11-17 PROCEDURE — 0651 HSPC ROUTINE HOME CARE

## 2021-11-18 PROCEDURE — 0651 HSPC ROUTINE HOME CARE

## 2021-11-19 ENCOUNTER — HOME CARE VISIT (OUTPATIENT)
Dept: SCHEDULING | Facility: HOME HEALTH | Age: 86
End: 2021-11-19
Payer: MEDICARE

## 2021-11-19 PROCEDURE — 0651 HSPC ROUTINE HOME CARE

## 2021-11-19 PROCEDURE — G0156 HHCP-SVS OF AIDE,EA 15 MIN: HCPCS

## 2021-11-20 PROCEDURE — 0651 HSPC ROUTINE HOME CARE

## 2021-11-21 ENCOUNTER — HOME CARE VISIT (OUTPATIENT)
Dept: SCHEDULING | Facility: HOME HEALTH | Age: 86
End: 2021-11-21
Payer: MEDICARE

## 2021-11-21 VITALS
DIASTOLIC BLOOD PRESSURE: 87 MMHG | HEART RATE: 57 BPM | SYSTOLIC BLOOD PRESSURE: 142 MMHG | RESPIRATION RATE: 21 BRPM | TEMPERATURE: 96.6 F

## 2021-11-21 PROCEDURE — 0651 HSPC ROUTINE HOME CARE

## 2021-11-21 PROCEDURE — G0299 HHS/HOSPICE OF RN EA 15 MIN: HCPCS

## 2021-11-21 NOTE — HOSPICE
Pt./ CG screened for COVID-19 Negative RN assessment completed Vitals assessed WNL's pt. resting on her back but HOB elevated at 45 degrees. Pt. continues to have episodes of anxiety/ pain for which CG uses Roxanol and Ativan as needed. Pt. was a little agitated today. . Skin warm dry and intact. Incontinent of B/B. Per CG pt. is urinating without any issues. Changes briefs average of 6 to 8x's. Last BM 11/20/21 Appetite/ Fluid intake is good per PCG. Pt. eats 90%. Drinks plenty of liquids. Cg knows to be careful with pt. to make sure HOB is elevated at 90 degrees. Use a 10 cc syringe, gently rub pt's throat to encourage her to swallow. Medications reviewed with CG: Nystatin powder to left inner forearm rash, powder seems to be helping. . Supplies:ordered DME needed None. No falls reported, bed rails up x 2 at all times. Safety precautions are being followed. Pt. is totally dependent for all care. Pt. has PCG 's that stay 12 hrs shifts during the day. Pt. is changed, turned and repositioned freq.

## 2021-11-22 ENCOUNTER — HOME CARE VISIT (OUTPATIENT)
Dept: SCHEDULING | Facility: HOME HEALTH | Age: 86
End: 2021-11-22
Payer: MEDICARE

## 2021-11-22 ENCOUNTER — HOME CARE VISIT (OUTPATIENT)
Dept: HOSPICE | Facility: HOSPICE | Age: 86
End: 2021-11-22
Payer: MEDICARE

## 2021-11-22 PROCEDURE — G0155 HHCP-SVS OF CSW,EA 15 MIN: HCPCS

## 2021-11-22 PROCEDURE — 0651 HSPC ROUTINE HOME CARE

## 2021-11-22 PROCEDURE — G0156 HHCP-SVS OF AIDE,EA 15 MIN: HCPCS

## 2021-11-23 PROCEDURE — 0651 HSPC ROUTINE HOME CARE

## 2021-11-24 ENCOUNTER — HOME CARE VISIT (OUTPATIENT)
Dept: SCHEDULING | Facility: HOME HEALTH | Age: 86
End: 2021-11-24
Payer: MEDICARE

## 2021-11-24 PROCEDURE — 0651 HSPC ROUTINE HOME CARE

## 2021-11-24 PROCEDURE — G0156 HHCP-SVS OF AIDE,EA 15 MIN: HCPCS

## 2021-11-25 PROCEDURE — 0651 HSPC ROUTINE HOME CARE

## 2021-11-26 ENCOUNTER — HOME CARE VISIT (OUTPATIENT)
Dept: SCHEDULING | Facility: HOME HEALTH | Age: 86
End: 2021-11-26
Payer: MEDICARE

## 2021-11-26 PROCEDURE — 0651 HSPC ROUTINE HOME CARE

## 2021-11-26 PROCEDURE — G0156 HHCP-SVS OF AIDE,EA 15 MIN: HCPCS

## 2021-11-26 NOTE — HOSPICE
Mrs Tianna Wells is sleeping this visit. MUKUND met with kelsey spouse, Marcelo Watkins, and their hired caregiver. Mr Marcelo Watkins is fairly talkative today. Douglas Figueroa is running errands. Marcelo Watkins did some life review. He says he is doing \"fairly well\". He worries about his wife he says but they are \"old\"and he says they have had a \"good\" life together. MUKUND provided active listening and offered emotional support. MUKUND delivered their gift from [facility] [employer] for the Josef. No new needs are identified. He expressed appreciaiton for care and  support provided.

## 2021-11-27 PROCEDURE — 0651 HSPC ROUTINE HOME CARE

## 2021-11-28 PROCEDURE — 0651 HSPC ROUTINE HOME CARE

## 2021-11-29 ENCOUNTER — HOME CARE VISIT (OUTPATIENT)
Dept: SCHEDULING | Facility: HOME HEALTH | Age: 86
End: 2021-11-29
Payer: MEDICARE

## 2021-11-29 VITALS
HEART RATE: 60 BPM | DIASTOLIC BLOOD PRESSURE: 73 MMHG | TEMPERATURE: 97.4 F | OXYGEN SATURATION: 99 % | SYSTOLIC BLOOD PRESSURE: 116 MMHG | RESPIRATION RATE: 18 BRPM

## 2021-11-29 PROCEDURE — 0651 HSPC ROUTINE HOME CARE

## 2021-11-29 PROCEDURE — G0299 HHS/HOSPICE OF RN EA 15 MIN: HCPCS

## 2021-11-29 PROCEDURE — G0156 HHCP-SVS OF AIDE,EA 15 MIN: HCPCS

## 2021-11-29 NOTE — HOSPICE
Pt./ CG screened for COVID-19 Negative RN assessment completed Vitals assessed WNL's pt. resting on her left side but HOB elevated at 30 degrees. Pt. continues to have episodes of anxiety/ pain for which CG uses Roxanol and Ativan as needed. Pt. was a little agitated today. . Skin warm dry and intact. Incontinent of B/B. Per CG pt. is urinating without any issues. Changes briefs average of 6 to 8x's. Last BM 11/27/21 Appetite/ Fluid intake is good per PCG. Pt. eats 90%. Drinks plenty of liquids. Cg knows to be careful with pt. to make sure HOB is elevated at 90 degrees. Use a 10 cc syringe, gently rub pt's throat to encourage her to swallow. Medications reviewed with CG: No RF needed. Supplies:ordered wipes , chuxs DME needed: None. No falls reported, bed rails up x 2 at all times. Safety precautions are being followed. Pt. is totally dependent for all care. Pt. has PCG 's that stay 12 hrs shifts during the day. Pt. is changed, turned and repositioned freq.

## 2021-11-30 PROCEDURE — 0651 HSPC ROUTINE HOME CARE

## 2021-12-01 ENCOUNTER — HOME CARE VISIT (OUTPATIENT)
Dept: SCHEDULING | Facility: HOME HEALTH | Age: 86
End: 2021-12-01
Payer: MEDICARE

## 2021-12-01 PROCEDURE — 0651 HSPC ROUTINE HOME CARE

## 2021-12-01 PROCEDURE — G0156 HHCP-SVS OF AIDE,EA 15 MIN: HCPCS

## 2021-12-02 PROCEDURE — 0651 HSPC ROUTINE HOME CARE

## 2021-12-03 ENCOUNTER — HOME CARE VISIT (OUTPATIENT)
Dept: SCHEDULING | Facility: HOME HEALTH | Age: 86
End: 2021-12-03
Payer: MEDICARE

## 2021-12-03 PROCEDURE — 0651 HSPC ROUTINE HOME CARE

## 2021-12-03 PROCEDURE — G0156 HHCP-SVS OF AIDE,EA 15 MIN: HCPCS

## 2021-12-04 PROCEDURE — 0651 HSPC ROUTINE HOME CARE

## 2021-12-05 PROCEDURE — 0651 HSPC ROUTINE HOME CARE

## 2021-12-06 ENCOUNTER — HOME CARE VISIT (OUTPATIENT)
Dept: SCHEDULING | Facility: HOME HEALTH | Age: 86
End: 2021-12-06
Payer: MEDICARE

## 2021-12-06 ENCOUNTER — HOME CARE VISIT (OUTPATIENT)
Dept: HOSPICE | Facility: HOSPICE | Age: 86
End: 2021-12-06
Payer: MEDICARE

## 2021-12-06 VITALS — SYSTOLIC BLOOD PRESSURE: 123 MMHG | DIASTOLIC BLOOD PRESSURE: 75 MMHG

## 2021-12-06 PROCEDURE — G0299 HHS/HOSPICE OF RN EA 15 MIN: HCPCS

## 2021-12-06 PROCEDURE — G0155 HHCP-SVS OF CSW,EA 15 MIN: HCPCS

## 2021-12-06 PROCEDURE — 0651 HSPC ROUTINE HOME CARE

## 2021-12-06 PROCEDURE — G0156 HHCP-SVS OF AIDE,EA 15 MIN: HCPCS

## 2021-12-06 NOTE — HOSPICE
Pt./ CG screened for COVID-19 Negative RN assessment completed Vitals assessed WNL's pt. resting on her back but HOB elevated at 30 degrees. Héctor Orosco was present to do her bath. Pt. continues to have episodes of anxiety/ pain for which CG uses Roxanol and Ativan as needed. Skin warm dry and intact. Incontinent of B/B. Per CG pt. is urinating without any issues. Changes briefs average of 6 to 8x's. Last BM 12/5/21 Appetite/ Fluid intake is good per PCG. Pt. eats 90%. Drinks plenty of liquids. Cg knows to be careful with pt. to make sure HOB is elevated at 90 degrees. Use a 10 cc syringe, gently rub pt's throat to encourage her to swallow. Medications reviewed with CG: No RF needed. Supplies:ordered wipes , chuxs DME needed: None. No falls reported, bed rails up x 2 at all times. Safety precautions are being followed. Pt. is totally dependent for all care. Pt. has PCG 's that stay 12 hrs shifts during the day. Pt. is changed, turned and repositioned freq.

## 2021-12-07 PROCEDURE — 0651 HSPC ROUTINE HOME CARE

## 2021-12-08 ENCOUNTER — HOME CARE VISIT (OUTPATIENT)
Dept: SCHEDULING | Facility: HOME HEALTH | Age: 86
End: 2021-12-08
Payer: MEDICARE

## 2021-12-08 PROCEDURE — G0156 HHCP-SVS OF AIDE,EA 15 MIN: HCPCS

## 2021-12-08 PROCEDURE — 0651 HSPC ROUTINE HOME CARE

## 2021-12-09 PROCEDURE — 0651 HSPC ROUTINE HOME CARE

## 2021-12-10 ENCOUNTER — HOME CARE VISIT (OUTPATIENT)
Dept: SCHEDULING | Facility: HOME HEALTH | Age: 86
End: 2021-12-10
Payer: MEDICARE

## 2021-12-10 PROCEDURE — 0651 HSPC ROUTINE HOME CARE

## 2021-12-10 PROCEDURE — G0156 HHCP-SVS OF AIDE,EA 15 MIN: HCPCS

## 2021-12-10 NOTE — HOSPICE
Jarrod Hernandez is alert and follows with her eyes. She did not speak to SW. Her hired caregiver and spouse are present. Kaley Melissa is still sleeping. Catherine Hawkins is fairly talkative and cheerful. He says he is ' doing pretty good for 89 yrs'. He seems to be managing fairly well. He always asks about Jarrod Hernandez' status. Kaley Melissa and the caregivers continue to provide optimal care. No new needs are identified. Jarrod Hernandez is responding more to her RN case manager.   The

## 2021-12-11 PROCEDURE — 0651 HSPC ROUTINE HOME CARE

## 2021-12-12 PROCEDURE — 0651 HSPC ROUTINE HOME CARE

## 2021-12-13 ENCOUNTER — HOME CARE VISIT (OUTPATIENT)
Dept: SCHEDULING | Facility: HOME HEALTH | Age: 86
End: 2021-12-13
Payer: MEDICARE

## 2021-12-13 VITALS
HEART RATE: 64 BPM | SYSTOLIC BLOOD PRESSURE: 135 MMHG | RESPIRATION RATE: 18 BRPM | DIASTOLIC BLOOD PRESSURE: 81 MMHG | OXYGEN SATURATION: 96 % | TEMPERATURE: 98.7 F

## 2021-12-13 PROCEDURE — 0651 HSPC ROUTINE HOME CARE

## 2021-12-13 PROCEDURE — G0299 HHS/HOSPICE OF RN EA 15 MIN: HCPCS

## 2021-12-13 PROCEDURE — G0156 HHCP-SVS OF AIDE,EA 15 MIN: HCPCS

## 2021-12-13 NOTE — HOSPICE
Pt./ CG screened for COVID-19 Negative RN assessment completed Vitals assessed WNL's pt. resting on her back but HOB elevated at 30 degrees. Pt. continues to have episodes of anxiety/ pain for which CG uses Roxanol and Ativan as needed. Skin warm dry and intact. Incontinent of B/B. Per CG pt. is urinating without any issues. Changes briefs average of 6 to 8x's. Last BM 12/12/21 Appetite/ Fluid intake is good per PCG. Pt. eats 90%. Drinks plenty of liquids. Cg knows to be careful with pt. to make sure HOB is elevated at 90 degrees. Use a 10 cc syringe, gently rub pt's throat to encourage her to swallow. Medications reviewed with CG: No RF needed. Supplies: None needed. DME needed: None. No falls reported, bed rails up x 2 at all times. Safety precautions are being followed. Pt. is totally dependent for all care. Pt. has PCG 's that stay 12 hrs shifts during the day. Pt. is changed, turned and repositioned freq.

## 2021-12-14 PROCEDURE — 0651 HSPC ROUTINE HOME CARE

## 2021-12-15 ENCOUNTER — HOME CARE VISIT (OUTPATIENT)
Dept: SCHEDULING | Facility: HOME HEALTH | Age: 86
End: 2021-12-15
Payer: MEDICARE

## 2021-12-15 PROCEDURE — 0651 HSPC ROUTINE HOME CARE

## 2021-12-15 PROCEDURE — G0156 HHCP-SVS OF AIDE,EA 15 MIN: HCPCS

## 2021-12-16 PROCEDURE — 0651 HSPC ROUTINE HOME CARE

## 2021-12-17 ENCOUNTER — HOME CARE VISIT (OUTPATIENT)
Dept: SCHEDULING | Facility: HOME HEALTH | Age: 86
End: 2021-12-17
Payer: MEDICARE

## 2021-12-17 PROCEDURE — 0651 HSPC ROUTINE HOME CARE

## 2021-12-17 PROCEDURE — G0156 HHCP-SVS OF AIDE,EA 15 MIN: HCPCS

## 2021-12-18 PROCEDURE — 0651 HSPC ROUTINE HOME CARE

## 2021-12-19 PROCEDURE — 0651 HSPC ROUTINE HOME CARE

## 2021-12-20 ENCOUNTER — HOME CARE VISIT (OUTPATIENT)
Dept: SCHEDULING | Facility: HOME HEALTH | Age: 86
End: 2021-12-20
Payer: MEDICARE

## 2021-12-20 ENCOUNTER — HOME CARE VISIT (OUTPATIENT)
Dept: HOSPICE | Facility: HOSPICE | Age: 86
End: 2021-12-20
Payer: MEDICARE

## 2021-12-20 VITALS
TEMPERATURE: 98.1 F | SYSTOLIC BLOOD PRESSURE: 148 MMHG | DIASTOLIC BLOOD PRESSURE: 87 MMHG | HEART RATE: 50 BPM | OXYGEN SATURATION: 96 % | RESPIRATION RATE: 21 BRPM

## 2021-12-20 PROCEDURE — G0156 HHCP-SVS OF AIDE,EA 15 MIN: HCPCS

## 2021-12-20 PROCEDURE — G0155 HHCP-SVS OF CSW,EA 15 MIN: HCPCS

## 2021-12-20 PROCEDURE — G0299 HHS/HOSPICE OF RN EA 15 MIN: HCPCS

## 2021-12-20 PROCEDURE — 0651 HSPC ROUTINE HOME CARE

## 2021-12-20 NOTE — HOSPICE
Pt./ CG screened for COVID-19 Negative RN assessment completed Vitals assessed WNL's pt. resting on her left side but HOB elevated at 30 degrees. Pt. continues to have episodes of anxiety/ pain for which CG uses Roxanol and Ativan as needed. Skin warm dry and intact. Incontinent of B/B. Per CG pt. is urinating without any issues. Changes briefs average of 6 to 8x's. Last BM 12/19/21 Appetite/ Fluid intake is good per PCG. Pt. eats 90%. Drinks plenty of liquids. Cg knows to be careful with pt. to make sure HOB is elevated at 90 degrees. Use a 10 cc syringe, gently rub pt's throat to encourage her to swallow. Medications reviewed with CG: No RF needed. Supplies: None needed. DME needed: None. No falls reported, bed rails up x 2 at all times. Safety precautions are being followed. Pt. is totally dependent for all care. Pt. has PCG 's that stay 12 hrs shifts during the day. Pt. is changed, turned and repositioned freq.

## 2021-12-21 PROCEDURE — 0651 HSPC ROUTINE HOME CARE

## 2021-12-22 ENCOUNTER — HOME CARE VISIT (OUTPATIENT)
Dept: SCHEDULING | Facility: HOME HEALTH | Age: 86
End: 2021-12-22
Payer: MEDICARE

## 2021-12-22 PROCEDURE — 0651 HSPC ROUTINE HOME CARE

## 2021-12-22 PROCEDURE — G0156 HHCP-SVS OF AIDE,EA 15 MIN: HCPCS

## 2021-12-23 PROCEDURE — 0651 HSPC ROUTINE HOME CARE

## 2021-12-24 ENCOUNTER — HOME CARE VISIT (OUTPATIENT)
Dept: HOSPICE | Facility: HOSPICE | Age: 86
End: 2021-12-24
Payer: MEDICARE

## 2021-12-24 PROCEDURE — 0651 HSPC ROUTINE HOME CARE

## 2021-12-25 PROCEDURE — 0651 HSPC ROUTINE HOME CARE

## 2021-12-26 PROCEDURE — 0651 HSPC ROUTINE HOME CARE

## 2021-12-27 ENCOUNTER — HOME CARE VISIT (OUTPATIENT)
Dept: SCHEDULING | Facility: HOME HEALTH | Age: 86
End: 2021-12-27
Payer: MEDICARE

## 2021-12-27 VITALS
DIASTOLIC BLOOD PRESSURE: 62 MMHG | RESPIRATION RATE: 16 BRPM | HEART RATE: 68 BPM | TEMPERATURE: 97.3 F | SYSTOLIC BLOOD PRESSURE: 122 MMHG

## 2021-12-27 PROCEDURE — 0651 HSPC ROUTINE HOME CARE

## 2021-12-27 PROCEDURE — G0156 HHCP-SVS OF AIDE,EA 15 MIN: HCPCS

## 2021-12-27 PROCEDURE — G0299 HHS/HOSPICE OF RN EA 15 MIN: HCPCS

## 2021-12-27 NOTE — HOSPICE
Ag Diana is alert today and will follow you with her eyes. Saulo Chen is present this visit. She is talkative and cheerful. She reports that they are coping well and she denies any needs. Everton Jeffers is eating  in the kitchen. . The family will gather later this week for Debra. SW offered emotional support and availability.

## 2021-12-28 PROCEDURE — 0651 HSPC ROUTINE HOME CARE

## 2021-12-29 ENCOUNTER — HOME CARE VISIT (OUTPATIENT)
Dept: SCHEDULING | Facility: HOME HEALTH | Age: 86
End: 2021-12-29
Payer: MEDICARE

## 2021-12-29 PROCEDURE — 0651 HSPC ROUTINE HOME CARE

## 2021-12-29 PROCEDURE — G0156 HHCP-SVS OF AIDE,EA 15 MIN: HCPCS

## 2021-12-30 PROCEDURE — 0651 HSPC ROUTINE HOME CARE

## 2021-12-31 ENCOUNTER — HOME CARE VISIT (OUTPATIENT)
Dept: HOSPICE | Facility: HOSPICE | Age: 86
End: 2021-12-31
Payer: MEDICARE

## 2021-12-31 PROCEDURE — 0651 HSPC ROUTINE HOME CARE

## 2022-01-01 PROCEDURE — 0651 HSPC ROUTINE HOME CARE

## 2022-01-02 PROCEDURE — 0651 HSPC ROUTINE HOME CARE

## 2022-01-03 ENCOUNTER — HOME CARE VISIT (OUTPATIENT)
Dept: SCHEDULING | Facility: HOME HEALTH | Age: 87
End: 2022-01-03
Payer: MEDICARE

## 2022-01-03 ENCOUNTER — HOME CARE VISIT (OUTPATIENT)
Dept: HOSPICE | Facility: HOSPICE | Age: 87
End: 2022-01-03
Payer: MEDICARE

## 2022-01-03 VITALS — RESPIRATION RATE: 20 BRPM | HEART RATE: 72 BPM

## 2022-01-03 PROCEDURE — G0156 HHCP-SVS OF AIDE,EA 15 MIN: HCPCS

## 2022-01-03 PROCEDURE — G0299 HHS/HOSPICE OF RN EA 15 MIN: HCPCS

## 2022-01-03 PROCEDURE — 0651 HSPC ROUTINE HOME CARE

## 2022-01-04 PROCEDURE — 0651 HSPC ROUTINE HOME CARE

## 2022-01-04 NOTE — HOSPICE
pt in her bed sitting up eating her supper. Hired caregiver feeding pt regular food; she is chewing her food well and swallowing food without difficulty, not pocketing food. pt has tendency to lean to her left. repositioned in central position with pillow proppee to her left side for support. CG said pt has no pain and is not taking any medication for pain or shortness of breath. she is calm, and not in respiratory distress. she is taking adequate fluids via syringe and urine without strong odor. she is incontinent of bowel and bladder, requires total care. Rigidity in all extremities noted. unable to check blood pressure due to rigidity in upper arms. No issues noted this visit, no refills needed this visit.

## 2022-01-05 ENCOUNTER — HOME CARE VISIT (OUTPATIENT)
Dept: SCHEDULING | Facility: HOME HEALTH | Age: 87
End: 2022-01-05
Payer: MEDICARE

## 2022-01-05 PROCEDURE — G0156 HHCP-SVS OF AIDE,EA 15 MIN: HCPCS

## 2022-01-05 PROCEDURE — 0651 HSPC ROUTINE HOME CARE

## 2022-01-06 PROCEDURE — 0651 HSPC ROUTINE HOME CARE

## 2022-01-07 ENCOUNTER — HOME CARE VISIT (OUTPATIENT)
Dept: SCHEDULING | Facility: HOME HEALTH | Age: 87
End: 2022-01-07
Payer: MEDICARE

## 2022-01-07 PROCEDURE — 0651 HSPC ROUTINE HOME CARE

## 2022-01-07 PROCEDURE — G0156 HHCP-SVS OF AIDE,EA 15 MIN: HCPCS

## 2022-01-08 PROCEDURE — 0651 HSPC ROUTINE HOME CARE

## 2022-01-09 PROCEDURE — 0651 HSPC ROUTINE HOME CARE

## 2022-01-10 ENCOUNTER — HOME CARE VISIT (OUTPATIENT)
Dept: SCHEDULING | Facility: HOME HEALTH | Age: 87
End: 2022-01-10
Payer: MEDICARE

## 2022-01-10 VITALS
RESPIRATION RATE: 21 BRPM | HEART RATE: 70 BPM | DIASTOLIC BLOOD PRESSURE: 78 MMHG | OXYGEN SATURATION: 90 % | TEMPERATURE: 96.3 F | SYSTOLIC BLOOD PRESSURE: 129 MMHG

## 2022-01-10 PROCEDURE — G0156 HHCP-SVS OF AIDE,EA 15 MIN: HCPCS

## 2022-01-10 PROCEDURE — 0651 HSPC ROUTINE HOME CARE

## 2022-01-10 PROCEDURE — G0299 HHS/HOSPICE OF RN EA 15 MIN: HCPCS

## 2022-01-10 NOTE — HOSPICE
Pt./ CG screened for COVID-19 Negative RN assessment completed Pt. alert/sitting up in bed taking liquids via syringe when I arrived. Pt. continues to have episodes of anxiety/ pain for which CG uses Roxanol and Ativan as needed. Skin warm dry and intact. Incontinent of B/B. Per CG pt. is urinating without any issues. Changes briefs average of 6 to 8x's. Appetite/ Fluid intake is good per PCG. Pt. eats 90%. Drinks plenty of liquids. Cg knows to be careful with pt. to make sure HOB is elevated at 90 degrees. Use a 10 cc syringe, gently rub pt's throat to encourage her to swallow. Medications reviewed with CG: No RF needed. Supplies: Ordered. DME needed: None. No falls reported, bed rails up x 2 at all times. Safety precautions are being followed. Pt. is totally dependent for all care. Pt. has PCG 's that stay 12 hrs shifts during the day. Pt. is changed, turned and repositioned freq. CG in agreemnt with POC. Knows how to contact Hospice for questions, concerns and needs.

## 2022-01-11 PROCEDURE — 0651 HSPC ROUTINE HOME CARE

## 2022-01-12 ENCOUNTER — HOME CARE VISIT (OUTPATIENT)
Dept: SCHEDULING | Facility: HOME HEALTH | Age: 87
End: 2022-01-12
Payer: MEDICARE

## 2022-01-12 PROCEDURE — 0651 HSPC ROUTINE HOME CARE

## 2022-01-12 PROCEDURE — G0156 HHCP-SVS OF AIDE,EA 15 MIN: HCPCS

## 2022-01-13 ENCOUNTER — HOME CARE VISIT (OUTPATIENT)
Dept: SCHEDULING | Facility: HOME HEALTH | Age: 87
End: 2022-01-13
Payer: MEDICARE

## 2022-01-13 PROCEDURE — 0651 HSPC ROUTINE HOME CARE

## 2022-01-14 ENCOUNTER — HOME CARE VISIT (OUTPATIENT)
Dept: HOSPICE | Facility: HOSPICE | Age: 87
End: 2022-01-14
Payer: MEDICARE

## 2022-01-14 ENCOUNTER — HOME CARE VISIT (OUTPATIENT)
Dept: SCHEDULING | Facility: HOME HEALTH | Age: 87
End: 2022-01-14
Payer: MEDICARE

## 2022-01-14 PROCEDURE — G0156 HHCP-SVS OF AIDE,EA 15 MIN: HCPCS

## 2022-01-14 PROCEDURE — 0651 HSPC ROUTINE HOME CARE

## 2022-01-15 PROCEDURE — 0651 HSPC ROUTINE HOME CARE

## 2022-01-16 PROCEDURE — 0651 HSPC ROUTINE HOME CARE

## 2022-01-17 ENCOUNTER — HOME CARE VISIT (OUTPATIENT)
Dept: HOSPICE | Facility: HOSPICE | Age: 87
End: 2022-01-17
Payer: MEDICARE

## 2022-01-17 PROCEDURE — 0651 HSPC ROUTINE HOME CARE

## 2022-01-18 PROCEDURE — 0651 HSPC ROUTINE HOME CARE

## 2022-01-19 ENCOUNTER — HOME CARE VISIT (OUTPATIENT)
Dept: SCHEDULING | Facility: HOME HEALTH | Age: 87
End: 2022-01-19
Payer: MEDICARE

## 2022-01-19 VITALS
SYSTOLIC BLOOD PRESSURE: 148 MMHG | RESPIRATION RATE: 18 BRPM | DIASTOLIC BLOOD PRESSURE: 89 MMHG | HEART RATE: 75 BPM | TEMPERATURE: 98.1 F

## 2022-01-19 PROCEDURE — G0299 HHS/HOSPICE OF RN EA 15 MIN: HCPCS

## 2022-01-19 PROCEDURE — 0651 HSPC ROUTINE HOME CARE

## 2022-01-19 PROCEDURE — G0156 HHCP-SVS OF AIDE,EA 15 MIN: HCPCS

## 2022-01-19 NOTE — HOSPICE
Pt./ CG screened for COVID-19 Negative RN assessment completed  Pt. napping when I arrived today. Pt. continues to have episodes of anxiety/ pain for which CG uses Roxanol and Ativan as needed. Pt. gets very anxious easily. Skin warm dry and intact. Incontinent of B/B. Per CG pt. is urinating without any issues. Changes briefs average of 6 to 8x's. Appetite/ Fluid intake is good per PCG. Pt. eats 90%. Drinks plenty of liquids. Cg knows to be careful with pt. to make sure HOB is elevated at 90 degrees. Use a 10 cc syringe, gently rub pt's throat to encourage her to swallow. Medications reviewed with CG: No RF needed. Supplies: Ordered. DME needed: None. No falls reported, bed rails up x 2 at all times. Safety precautions are being followed. Pt. is totally dependent for all care. Pt. has PCG 's that stay 12 hrs shifts during the day. Pt. is changed, turned and repositioned freq. CG in agreemnt with POC. Knows how to contact Hospice for questions, concerns and needs.

## 2022-01-20 PROCEDURE — 0651 HSPC ROUTINE HOME CARE

## 2022-01-21 ENCOUNTER — HOME CARE VISIT (OUTPATIENT)
Dept: HOSPICE | Facility: HOSPICE | Age: 87
End: 2022-01-21
Payer: MEDICARE

## 2022-01-21 PROCEDURE — 0651 HSPC ROUTINE HOME CARE

## 2022-01-22 PROCEDURE — 0651 HSPC ROUTINE HOME CARE

## 2022-01-23 PROCEDURE — 0651 HSPC ROUTINE HOME CARE

## 2022-01-24 ENCOUNTER — HOME CARE VISIT (OUTPATIENT)
Dept: SCHEDULING | Facility: HOME HEALTH | Age: 87
End: 2022-01-24
Payer: MEDICARE

## 2022-01-24 ENCOUNTER — HOME CARE VISIT (OUTPATIENT)
Dept: HOSPICE | Facility: HOSPICE | Age: 87
End: 2022-01-24
Payer: MEDICARE

## 2022-01-24 VITALS
OXYGEN SATURATION: 90 % | SYSTOLIC BLOOD PRESSURE: 103 MMHG | RESPIRATION RATE: 20 BRPM | HEART RATE: 60 BPM | TEMPERATURE: 97.5 F | DIASTOLIC BLOOD PRESSURE: 69 MMHG

## 2022-01-24 PROCEDURE — G0155 HHCP-SVS OF CSW,EA 15 MIN: HCPCS

## 2022-01-24 PROCEDURE — 0651 HSPC ROUTINE HOME CARE

## 2022-01-24 PROCEDURE — G0299 HHS/HOSPICE OF RN EA 15 MIN: HCPCS

## 2022-01-25 PROCEDURE — 0651 HSPC ROUTINE HOME CARE

## 2022-01-25 NOTE — HOSPICE
Chalino Caba is being fed breakfast by her hired caregiver. She is alert and will follow with her eyes. Her RN, hired caregiver and dtr are present. They say she has not had much of an appetite the past day or so. She does have times when he fluctuates. Raya Adair is present and she has gotten a new puppy that Chalino Caba seems to respond some to. She tried to move her fingers over the puppy and tried to look at it. Apparently Bijal Pena is not wanting Raya Adair to keep it. Hopeful that she will. Other than the appetite for the past day, there is no change in patient or her needs. Raya Adair reports that they are coping well.  SW provided praise for care provided and offered availability

## 2022-01-25 NOTE — HOSPICE
Pt./ CG screened for COVID-19 Negative RN assessment completed WNLS. Pt. eating when I arrived today. Pt. continues to have episodes of anxiety/ pain for which CG uses Roxanol and Ativan as needed. Pt. gets very anxious easily. Skin warm dry and intact. Incontinent of B/B. Per CG pt. is urinating without any issues. Changes briefs average of 6 to 8x's. Appetite/ Fluid intake is good per PCG. Pt. eats 80%. Drinks plenty of liquids. Cg knows to be careful with pt. to make sure HOB is elevated at 90 degrees. Use a 10 cc syringe, gently rub pt's throat to encourage her to swallow. Medications reviewed with CG: No RF needed. Supplies: Ordered. DME needed: None. No falls reported, bed rails up x 2 at all times. Safety precautions are being followed. Pt. is totally dependent for all care. Pt. has PCG 's that stay 12 hrs shifts during the day. Pt. is changed, turned and repositioned freq. CG in agreemnt with POC. Knows how to contact Hospice for questions, concerns and needs.

## 2022-01-26 ENCOUNTER — HOME CARE VISIT (OUTPATIENT)
Dept: SCHEDULING | Facility: HOME HEALTH | Age: 87
End: 2022-01-26
Payer: MEDICARE

## 2022-01-26 PROCEDURE — G0156 HHCP-SVS OF AIDE,EA 15 MIN: HCPCS

## 2022-01-26 PROCEDURE — 0651 HSPC ROUTINE HOME CARE

## 2022-01-27 PROCEDURE — 0651 HSPC ROUTINE HOME CARE

## 2022-01-28 ENCOUNTER — HOME CARE VISIT (OUTPATIENT)
Dept: SCHEDULING | Facility: HOME HEALTH | Age: 87
End: 2022-01-28
Payer: MEDICARE

## 2022-01-28 PROCEDURE — G0156 HHCP-SVS OF AIDE,EA 15 MIN: HCPCS

## 2022-01-28 PROCEDURE — 0651 HSPC ROUTINE HOME CARE

## 2022-01-29 PROCEDURE — 0651 HSPC ROUTINE HOME CARE

## 2022-01-30 PROCEDURE — 0651 HSPC ROUTINE HOME CARE

## 2022-01-31 ENCOUNTER — HOME CARE VISIT (OUTPATIENT)
Dept: SCHEDULING | Facility: HOME HEALTH | Age: 87
End: 2022-01-31
Payer: MEDICARE

## 2022-01-31 VITALS
DIASTOLIC BLOOD PRESSURE: 79 MMHG | RESPIRATION RATE: 21 BRPM | TEMPERATURE: 97.3 F | SYSTOLIC BLOOD PRESSURE: 143 MMHG | HEART RATE: 56 BPM

## 2022-01-31 PROCEDURE — 0651 HSPC ROUTINE HOME CARE

## 2022-01-31 PROCEDURE — G0299 HHS/HOSPICE OF RN EA 15 MIN: HCPCS

## 2022-01-31 PROCEDURE — G0156 HHCP-SVS OF AIDE,EA 15 MIN: HCPCS

## 2022-01-31 NOTE — HOSPICE
Pt./ CG screened for COVID-19 Negative RN assessment completed Vitals assessed WNL's pt. resting on her right side but HOB elevated at 30 degrees. Pt. continues to have episodes of anxiety/ pain for which CG uses Roxanol and Ativan as needed. Skin warm dry and intact. Incontinent of B/B. Per CG pt. is urinating without any issues. Changes briefs average of 6 x's day. Last BM 1/30/22. Appetite/ Fluid intake is good per PCG. Pt. eats 90%. Drinks plenty of liquids. Cg knows to be careful with pt. to make sure HOB is elevated at 90 degrees. Use a 10 cc syringe, gently rub pt's throat to encourage her to swallow. Medications reviewed with CG: No RF needed. Supplies:ordered None. DME needed: None. No falls reported, bed rails up x 2 at all times. Safety precautions are being followed. Pt. is totally dependent for all care. Pt. has PCG 's that stay 12 hrs shifts during the day. Pt. is changed, turned and repositioned freq.

## 2022-02-01 ENCOUNTER — HOME CARE VISIT (OUTPATIENT)
Dept: HOSPICE | Facility: HOSPICE | Age: 87
End: 2022-02-01
Payer: MEDICARE

## 2022-02-01 PROCEDURE — 0651 HSPC ROUTINE HOME CARE

## 2022-02-02 ENCOUNTER — HOME CARE VISIT (OUTPATIENT)
Dept: SCHEDULING | Facility: HOME HEALTH | Age: 87
End: 2022-02-02
Payer: MEDICARE

## 2022-02-02 ENCOUNTER — HOME CARE VISIT (OUTPATIENT)
Dept: HOSPICE | Facility: HOSPICE | Age: 87
End: 2022-02-02
Payer: MEDICARE

## 2022-02-02 PROBLEM — N18.4 STAGE 4 CHRONIC KIDNEY DISEASE (HCC): Status: ACTIVE | Noted: 2022-02-02

## 2022-02-02 PROBLEM — E78.5 DYSLIPIDEMIA: Status: ACTIVE | Noted: 2022-02-02

## 2022-02-02 PROCEDURE — 0651 HSPC ROUTINE HOME CARE

## 2022-02-02 PROCEDURE — G0156 HHCP-SVS OF AIDE,EA 15 MIN: HCPCS

## 2022-02-03 PROCEDURE — 0651 HSPC ROUTINE HOME CARE

## 2022-02-04 ENCOUNTER — HOME CARE VISIT (OUTPATIENT)
Dept: SCHEDULING | Facility: HOME HEALTH | Age: 87
End: 2022-02-04
Payer: MEDICARE

## 2022-02-04 PROCEDURE — 0651 HSPC ROUTINE HOME CARE

## 2022-02-04 PROCEDURE — G0156 HHCP-SVS OF AIDE,EA 15 MIN: HCPCS

## 2022-02-05 PROCEDURE — 0651 HSPC ROUTINE HOME CARE

## 2022-02-06 PROCEDURE — 0651 HSPC ROUTINE HOME CARE

## 2022-02-07 ENCOUNTER — HOME CARE VISIT (OUTPATIENT)
Dept: HOSPICE | Facility: HOSPICE | Age: 87
End: 2022-02-07
Payer: MEDICARE

## 2022-02-07 ENCOUNTER — HOME CARE VISIT (OUTPATIENT)
Dept: SCHEDULING | Facility: HOME HEALTH | Age: 87
End: 2022-02-07
Payer: MEDICARE

## 2022-02-07 VITALS
OXYGEN SATURATION: 90 % | SYSTOLIC BLOOD PRESSURE: 133 MMHG | DIASTOLIC BLOOD PRESSURE: 84 MMHG | RESPIRATION RATE: 18 BRPM | HEART RATE: 82 BPM | TEMPERATURE: 97.4 F

## 2022-02-07 PROCEDURE — G0156 HHCP-SVS OF AIDE,EA 15 MIN: HCPCS

## 2022-02-07 PROCEDURE — 0651 HSPC ROUTINE HOME CARE

## 2022-02-07 PROCEDURE — G0155 HHCP-SVS OF CSW,EA 15 MIN: HCPCS

## 2022-02-07 PROCEDURE — G0299 HHS/HOSPICE OF RN EA 15 MIN: HCPCS

## 2022-02-08 PROCEDURE — 0651 HSPC ROUTINE HOME CARE

## 2022-02-08 NOTE — HOSPICE
Pt./ CG screened for COVID-19 Negative RN assessment completed Vitals assessed WNL's Pt. sitting up in bed being fed  breakfast, HOB elevated at 90 degrees. Pt. continues to have episodes of anxiety/ pain for which CG uses Roxanol and Ativan as needed. Skin warm dry and intact. Incontinent of B/B. Per CG pt. is urinating without any issues. Changes briefs average of 6 x's day. Last BM 2/7/22. Appetite/ Fluid intake is good per PCG. Pt. eats 90%. Drinks plenty of liquids. .Use a 10 cc syringe, gently rub pt's throat to encourage her to swallow. Medications reviewed with CG: No RF needed. Supplies:ordered None. DME needed: None. No falls reported, bed rails up x 2 at all times. Safety precautions are being followed. Pt. is totally dependent for all care. Pt. has PCG 's that stay 12 hrs shifts during the day. Pt. is changed, turned and repositioned freq. Cg know how to reach [facility]  for needs or concerns.

## 2022-02-09 ENCOUNTER — HOME CARE VISIT (OUTPATIENT)
Dept: SCHEDULING | Facility: HOME HEALTH | Age: 87
End: 2022-02-09
Payer: MEDICARE

## 2022-02-09 PROCEDURE — G0156 HHCP-SVS OF AIDE,EA 15 MIN: HCPCS

## 2022-02-09 PROCEDURE — 0651 HSPC ROUTINE HOME CARE

## 2022-02-09 NOTE — HOSPICE
Robin Smith is well cared for by her dtr and hired caregivers. She is alert today but not interactive. She shows no signs of distress. Cecil Desai is requiring more spport from the caregivers. Konstantin Cronin has not gotten up yet. SW provided emotional support and offered availability.  No needs reported

## 2022-02-10 PROCEDURE — 0651 HSPC ROUTINE HOME CARE

## 2022-02-11 ENCOUNTER — HOME CARE VISIT (OUTPATIENT)
Dept: SCHEDULING | Facility: HOME HEALTH | Age: 87
End: 2022-02-11
Payer: MEDICARE

## 2022-02-11 PROCEDURE — G0156 HHCP-SVS OF AIDE,EA 15 MIN: HCPCS

## 2022-02-11 PROCEDURE — 0651 HSPC ROUTINE HOME CARE

## 2022-02-12 PROCEDURE — 0651 HSPC ROUTINE HOME CARE

## 2022-02-13 PROCEDURE — 0651 HSPC ROUTINE HOME CARE

## 2022-02-14 ENCOUNTER — HOME CARE VISIT (OUTPATIENT)
Dept: SCHEDULING | Facility: HOME HEALTH | Age: 87
End: 2022-02-14
Payer: MEDICARE

## 2022-02-14 VITALS
SYSTOLIC BLOOD PRESSURE: 135 MMHG | RESPIRATION RATE: 18 BRPM | DIASTOLIC BLOOD PRESSURE: 83 MMHG | HEART RATE: 68 BPM | TEMPERATURE: 97.3 F

## 2022-02-14 PROCEDURE — G0299 HHS/HOSPICE OF RN EA 15 MIN: HCPCS

## 2022-02-14 PROCEDURE — G0156 HHCP-SVS OF AIDE,EA 15 MIN: HCPCS

## 2022-02-14 PROCEDURE — 0651 HSPC ROUTINE HOME CARE

## 2022-02-14 NOTE — HOSPICE
Pt./ CG screened for COVID-19 Negative RN assessment completed Vitals assessed WNL's Pt. sitting up in bed being fed breakfast, HOB elevated at 90 degrees. Pt. continues to have episodes of anxiety/ pain for which CG uses Roxanol and Ativan as needed. Skin warm dry and intact. Incontinent of B/B. Per CG pt. is urinating without any issues. Changes briefs average of 6 x's day. Last BM 2/11/22. Appetite/ Fluid intake is good per PCG. Pt. eats 90%. Drinks plenty of liquids. .Use a 10 cc syringe, gently rub pt's throat to encourage her to swallow. Medications reviewed with CG: No RF needed. Supplies:ordered. . DME needed: None. No falls reported, bed rails up x 2 at all times. Safety precautions are being followed. Pt. is totally dependent for all care. Pt. has PCG 's that stay 12 hrs shifts during the day. Pt. is changed, turned and repositioned freq. Cg know how to reach [facility] for needs or concerns.

## 2022-02-15 PROCEDURE — 0651 HSPC ROUTINE HOME CARE

## 2022-02-16 ENCOUNTER — HOME CARE VISIT (OUTPATIENT)
Dept: SCHEDULING | Facility: HOME HEALTH | Age: 87
End: 2022-02-16
Payer: MEDICARE

## 2022-02-16 PROCEDURE — G0156 HHCP-SVS OF AIDE,EA 15 MIN: HCPCS

## 2022-02-16 PROCEDURE — 0651 HSPC ROUTINE HOME CARE

## 2022-02-17 PROCEDURE — 0651 HSPC ROUTINE HOME CARE

## 2022-02-18 ENCOUNTER — HOME CARE VISIT (OUTPATIENT)
Dept: SCHEDULING | Facility: HOME HEALTH | Age: 87
End: 2022-02-18
Payer: MEDICARE

## 2022-02-18 PROCEDURE — 0651 HSPC ROUTINE HOME CARE

## 2022-02-18 PROCEDURE — G0156 HHCP-SVS OF AIDE,EA 15 MIN: HCPCS

## 2022-02-19 PROCEDURE — 0651 HSPC ROUTINE HOME CARE

## 2022-02-20 PROCEDURE — 0651 HSPC ROUTINE HOME CARE

## 2022-02-21 ENCOUNTER — HOME CARE VISIT (OUTPATIENT)
Dept: SCHEDULING | Facility: HOME HEALTH | Age: 87
End: 2022-02-21
Payer: MEDICARE

## 2022-02-21 VITALS
SYSTOLIC BLOOD PRESSURE: 117 MMHG | OXYGEN SATURATION: 68 % | DIASTOLIC BLOOD PRESSURE: 74 MMHG | HEART RATE: 68 BPM | RESPIRATION RATE: 20 BRPM | TEMPERATURE: 96.7 F

## 2022-02-21 PROCEDURE — G0156 HHCP-SVS OF AIDE,EA 15 MIN: HCPCS

## 2022-02-21 PROCEDURE — 0651 HSPC ROUTINE HOME CARE

## 2022-02-21 PROCEDURE — G0299 HHS/HOSPICE OF RN EA 15 MIN: HCPCS

## 2022-02-21 NOTE — HOSPICE
Pt./ CG screened for COVID-19 Negative RN assessment completed Vitals assessed WNL's Pt. sitting up in bed being syringe fed breakfast, HOB elevated at 90 degrees. Pt. continues to have episodes of anxiety/ pain for which CG uses Roxanol and Ativan as needed. Skin warm dry and intact. Incontinent of B/B. Per CG pt. is urinating without any issues. Changes briefs average of 6 x's day. Last BM 2/21/22. Appetite/ Fluid intake is good per PCG. Pt. eats 90%. Drinks plenty of liquids. .Use a 10 cc syringe, gently rub pt's throat to encourage her to swallow. Medications reviewed with CG: No RF needed. Supplies:ordered. . DME needed: None. No falls reported, bed rails up x 2 at all times. Safety precautions are being followed. Pt. is totally dependent for all care. Pt. has PCG 's that stay 12 hrs shifts during the day. Pt. is changed, turned and repositioned freq. Cg know how to reach [facility] for needs or concerns.

## 2022-02-22 PROCEDURE — 0651 HSPC ROUTINE HOME CARE

## 2022-02-23 ENCOUNTER — HOME CARE VISIT (OUTPATIENT)
Dept: SCHEDULING | Facility: HOME HEALTH | Age: 87
End: 2022-02-23
Payer: MEDICARE

## 2022-02-23 PROCEDURE — 0651 HSPC ROUTINE HOME CARE

## 2022-02-23 PROCEDURE — G0156 HHCP-SVS OF AIDE,EA 15 MIN: HCPCS

## 2022-02-24 PROCEDURE — 0651 HSPC ROUTINE HOME CARE

## 2022-02-25 ENCOUNTER — HOME CARE VISIT (OUTPATIENT)
Dept: HOSPICE | Facility: HOSPICE | Age: 87
End: 2022-02-25
Payer: MEDICARE

## 2022-02-25 ENCOUNTER — HOME CARE VISIT (OUTPATIENT)
Dept: SCHEDULING | Facility: HOME HEALTH | Age: 87
End: 2022-02-25
Payer: MEDICARE

## 2022-02-25 PROCEDURE — G0156 HHCP-SVS OF AIDE,EA 15 MIN: HCPCS

## 2022-02-25 PROCEDURE — 0651 HSPC ROUTINE HOME CARE

## 2022-02-26 PROCEDURE — 0651 HSPC ROUTINE HOME CARE

## 2022-02-27 PROCEDURE — 0651 HSPC ROUTINE HOME CARE

## 2022-02-28 ENCOUNTER — HOME CARE VISIT (OUTPATIENT)
Dept: SCHEDULING | Facility: HOME HEALTH | Age: 87
End: 2022-02-28
Payer: MEDICARE

## 2022-02-28 VITALS
OXYGEN SATURATION: 97 % | DIASTOLIC BLOOD PRESSURE: 64 MMHG | SYSTOLIC BLOOD PRESSURE: 122 MMHG | RESPIRATION RATE: 21 BRPM | HEART RATE: 60 BPM | TEMPERATURE: 97.9 F

## 2022-02-28 PROCEDURE — G0156 HHCP-SVS OF AIDE,EA 15 MIN: HCPCS

## 2022-02-28 PROCEDURE — G0299 HHS/HOSPICE OF RN EA 15 MIN: HCPCS

## 2022-02-28 PROCEDURE — 0651 HSPC ROUTINE HOME CARE

## 2022-03-01 PROCEDURE — 0651 HSPC ROUTINE HOME CARE

## 2022-03-01 NOTE — HOSPICE
Pt./ CG screened for COVID-19 Negative RN assessment completed Vitals assessed WNL's Pt. resting in bed , HOB elevated at 45 degrees. Pt. continues to have episodes of anxiety/ pain for which CG uses Roxanol and Ativan as needed. Skin warm dry and intact. Incontinent of B/B. Per CG pt. is urinating without any issues. Changes briefs average of 6 x's day. Last BM 2/26/22. Appetite/ Fluid intake is good per PCG. Pt. eats 90%. Drinks plenty of liquids. .Use a 10 cc syringe, gently rub pt's throat to encourage her to swallow. Medications reviewed with CG: No RF needed. Supplies:ordered. . DME needed: None. No falls reported, bed rails up x 2 at all times. Safety precautions are being followed. Pt. is totally dependent for all care. Pt. has PCG 's that stay 12 hrs shifts during the day. Pt. is changed, turned and repositioned freq. Cg know how to reach [facility] for needs or concerns.

## 2022-03-02 ENCOUNTER — HOME CARE VISIT (OUTPATIENT)
Dept: SCHEDULING | Facility: HOME HEALTH | Age: 87
End: 2022-03-02
Payer: MEDICARE

## 2022-03-02 PROCEDURE — 0651 HSPC ROUTINE HOME CARE

## 2022-03-02 PROCEDURE — G0156 HHCP-SVS OF AIDE,EA 15 MIN: HCPCS

## 2022-03-03 PROCEDURE — 0651 HSPC ROUTINE HOME CARE

## 2022-03-04 ENCOUNTER — HOME CARE VISIT (OUTPATIENT)
Dept: SCHEDULING | Facility: HOME HEALTH | Age: 87
End: 2022-03-04
Payer: MEDICARE

## 2022-03-04 PROCEDURE — G0156 HHCP-SVS OF AIDE,EA 15 MIN: HCPCS

## 2022-03-04 PROCEDURE — 0651 HSPC ROUTINE HOME CARE

## 2022-03-05 PROCEDURE — 0651 HSPC ROUTINE HOME CARE

## 2022-03-06 PROCEDURE — 0651 HSPC ROUTINE HOME CARE

## 2022-03-07 ENCOUNTER — HOME CARE VISIT (OUTPATIENT)
Dept: SCHEDULING | Facility: HOME HEALTH | Age: 87
End: 2022-03-07
Payer: MEDICARE

## 2022-03-07 PROCEDURE — 0651 HSPC ROUTINE HOME CARE

## 2022-03-07 PROCEDURE — G0156 HHCP-SVS OF AIDE,EA 15 MIN: HCPCS

## 2022-03-08 PROCEDURE — 0651 HSPC ROUTINE HOME CARE

## 2022-03-09 ENCOUNTER — HOME CARE VISIT (OUTPATIENT)
Dept: SCHEDULING | Facility: HOME HEALTH | Age: 87
End: 2022-03-09
Payer: MEDICARE

## 2022-03-09 VITALS
HEART RATE: 76 BPM | TEMPERATURE: 98.3 F | SYSTOLIC BLOOD PRESSURE: 114 MMHG | RESPIRATION RATE: 21 BRPM | OXYGEN SATURATION: 96 % | DIASTOLIC BLOOD PRESSURE: 89 MMHG

## 2022-03-09 PROCEDURE — G0156 HHCP-SVS OF AIDE,EA 15 MIN: HCPCS

## 2022-03-09 PROCEDURE — G0299 HHS/HOSPICE OF RN EA 15 MIN: HCPCS

## 2022-03-09 PROCEDURE — 0651 HSPC ROUTINE HOME CARE

## 2022-03-10 PROCEDURE — 0651 HSPC ROUTINE HOME CARE

## 2022-03-10 NOTE — HOSPICE
Pt./ CG screened for COVID-19 Negative RN assessment completed See ROS. Vitals assessed WNL's  Pt. resting in bed  on her right side, HOB elevated at 35 degrees. Pt. continues to have episodes of anxiety/ pain for which CG uses Roxanol and Ativan as needed. Skin warm dry and intact. Incontinent of B/B. Per CG pt. is urinating without any issues. Changes briefs average of 6 x's day. Last BM 3/9/22. Appetite/ Fluid intake is good per PCG. Pt. eats 90%. Drinks plenty of liquids. Use a 10 cc syringe, gently rub pt's throat to encourage her to swallow. Medications reviewed with CG: No RF needed. Supplies:ordered. DME needed: None. No falls reported, bed rails up x 2 at all times. Safety precautions are being followed. Pt. is totally dependent for all care. Pt. has PCG 's that stay 12 hrs shifts during the day. Pt. is changed, turned and repositioned freq. Cg know how to reach [facility] for needs or concerns.

## 2022-03-11 ENCOUNTER — HOME CARE VISIT (OUTPATIENT)
Dept: SCHEDULING | Facility: HOME HEALTH | Age: 87
End: 2022-03-11
Payer: MEDICARE

## 2022-03-11 PROCEDURE — 0651 HSPC ROUTINE HOME CARE

## 2022-03-11 PROCEDURE — G0156 HHCP-SVS OF AIDE,EA 15 MIN: HCPCS

## 2022-03-12 PROCEDURE — 0651 HSPC ROUTINE HOME CARE

## 2022-03-13 PROCEDURE — 0651 HSPC ROUTINE HOME CARE

## 2022-03-14 ENCOUNTER — HOME CARE VISIT (OUTPATIENT)
Dept: SCHEDULING | Facility: HOME HEALTH | Age: 87
End: 2022-03-14
Payer: MEDICARE

## 2022-03-14 ENCOUNTER — HOME CARE VISIT (OUTPATIENT)
Dept: HOSPICE | Facility: HOSPICE | Age: 87
End: 2022-03-14
Payer: MEDICARE

## 2022-03-14 VITALS
RESPIRATION RATE: 21 BRPM | DIASTOLIC BLOOD PRESSURE: 63 MMHG | HEART RATE: 60 BPM | TEMPERATURE: 97.1 F | SYSTOLIC BLOOD PRESSURE: 99 MMHG

## 2022-03-14 PROCEDURE — 0651 HSPC ROUTINE HOME CARE

## 2022-03-14 PROCEDURE — G0156 HHCP-SVS OF AIDE,EA 15 MIN: HCPCS

## 2022-03-14 PROCEDURE — G0155 HHCP-SVS OF CSW,EA 15 MIN: HCPCS

## 2022-03-14 PROCEDURE — G0299 HHS/HOSPICE OF RN EA 15 MIN: HCPCS

## 2022-03-14 NOTE — HOSPICE
Pt./ CG screened for COVID-19 Negative RN assessment completed See ROS. Vitals assessed WNL's Pt. resting in bed on her right side HOB elevated at 35 degrees. Pt. continues to have episodes of anxiety/ pain for which CG uses Roxanol and Ativan as needed. Skin warm dry and intact. Incontinent of B/B. Per CG pt. is urinating without any issues. Changes briefs average of 6 x's day. Last BM 3/13/22. Appetite/ Fluid intake is good per PCG. Pt. eats 90%. Drinks plenty of liquids. Use a 10 cc syringe, gently rub pt's throat to encourage her to swallow. Medications reviewed with CG: No RF needed. Supplies:ordered. DME needed: None. No falls reported, bed rails up x 2 at all times. Safety precautions are being followed. Pt. is totally dependent for all care. MSW present during [address] today. Pt. has PCG 's that stay 12 hrs shifts during the day. Pt. is changed, turned and repositioned freq. Cg know how to reach [facility] for needs or concerns.

## 2022-03-15 PROCEDURE — 0651 HSPC ROUTINE HOME CARE

## 2022-03-16 ENCOUNTER — HOME CARE VISIT (OUTPATIENT)
Dept: SCHEDULING | Facility: HOME HEALTH | Age: 87
End: 2022-03-16
Payer: MEDICARE

## 2022-03-16 PROCEDURE — 0651 HSPC ROUTINE HOME CARE

## 2022-03-16 PROCEDURE — G0156 HHCP-SVS OF AIDE,EA 15 MIN: HCPCS

## 2022-03-16 NOTE — HOSPICE
Lyric Pittman is sleepy this visit. She will awaken  and look at you but she has completed her bath and breakfast and ready for her nap. Her dtr., Juan Santana  and the RN are present. Juan Santana says they are doing well and she denies any needs. She reports that Wendy Wilks is doing well right now too. SW provided active listening and emotional support.

## 2022-03-17 PROCEDURE — 0651 HSPC ROUTINE HOME CARE

## 2022-03-18 ENCOUNTER — HOME CARE VISIT (OUTPATIENT)
Dept: SCHEDULING | Facility: HOME HEALTH | Age: 87
End: 2022-03-18
Payer: MEDICARE

## 2022-03-18 PROCEDURE — 0651 HSPC ROUTINE HOME CARE

## 2022-03-18 PROCEDURE — G0156 HHCP-SVS OF AIDE,EA 15 MIN: HCPCS

## 2022-03-19 PROBLEM — G30.9 ALZHEIMER'S DEMENTIA WITHOUT BEHAVIORAL DISTURBANCE (HCC): Status: ACTIVE | Noted: 2019-10-08

## 2022-03-19 PROBLEM — E87.0 HYPERNATREMIA: Status: ACTIVE | Noted: 2019-10-04

## 2022-03-19 PROBLEM — Z51.5 HOSPICE CARE PATIENT: Status: ACTIVE | Noted: 2020-03-25

## 2022-03-19 PROBLEM — E78.5 DYSLIPIDEMIA: Status: ACTIVE | Noted: 2022-02-02

## 2022-03-19 PROBLEM — D53.9 MACROCYTIC ANEMIA: Status: ACTIVE | Noted: 2019-10-08

## 2022-03-19 PROBLEM — N17.9 AKI (ACUTE KIDNEY INJURY) (HCC): Status: ACTIVE | Noted: 2019-10-04

## 2022-03-19 PROBLEM — G93.41 ACUTE METABOLIC ENCEPHALOPATHY: Status: ACTIVE | Noted: 2019-10-04

## 2022-03-19 PROBLEM — N18.4 STAGE 4 CHRONIC KIDNEY DISEASE (HCC): Status: ACTIVE | Noted: 2022-02-02

## 2022-03-19 PROBLEM — F02.80 ALZHEIMER'S DEMENTIA WITHOUT BEHAVIORAL DISTURBANCE (HCC): Status: ACTIVE | Noted: 2019-10-08

## 2022-03-19 PROBLEM — D72.829 LEUKOCYTOSIS: Status: ACTIVE | Noted: 2019-10-08

## 2022-03-19 PROBLEM — R62.7 ADULT FAILURE TO THRIVE: Status: ACTIVE | Noted: 2019-10-06

## 2022-03-19 PROCEDURE — 0651 HSPC ROUTINE HOME CARE

## 2022-03-20 PROBLEM — E86.0 SEVERE DEHYDRATION: Status: ACTIVE | Noted: 2019-10-08

## 2022-03-20 PROCEDURE — 0651 HSPC ROUTINE HOME CARE

## 2022-03-21 ENCOUNTER — HOME CARE VISIT (OUTPATIENT)
Dept: HOSPICE | Facility: HOSPICE | Age: 87
End: 2022-03-21
Payer: MEDICARE

## 2022-03-21 PROCEDURE — 0651 HSPC ROUTINE HOME CARE

## 2022-03-22 PROCEDURE — 0651 HSPC ROUTINE HOME CARE

## 2022-03-23 ENCOUNTER — HOME CARE VISIT (OUTPATIENT)
Dept: SCHEDULING | Facility: HOME HEALTH | Age: 87
End: 2022-03-23
Payer: MEDICARE

## 2022-03-23 VITALS
HEART RATE: 70 BPM | SYSTOLIC BLOOD PRESSURE: 108 MMHG | OXYGEN SATURATION: 99 % | RESPIRATION RATE: 21 BRPM | TEMPERATURE: 97.9 F | DIASTOLIC BLOOD PRESSURE: 67 MMHG

## 2022-03-23 PROCEDURE — G0299 HHS/HOSPICE OF RN EA 15 MIN: HCPCS

## 2022-03-23 PROCEDURE — 0651 HSPC ROUTINE HOME CARE

## 2022-03-23 PROCEDURE — G0156 HHCP-SVS OF AIDE,EA 15 MIN: HCPCS

## 2022-03-23 NOTE — HOSPICE
Pt./ CG screened for COVID-19 Negative RN assessment completed. See ROS. Vitals assessed WNL's. O2 sat 99 % as per CG report. Pt. resting in bed on her right side HOB elevated at 35 degrees. She opened her eyes and responded when I spoke to her. Pt. continues to have episodes of anxiety/ pain for which CG uses Roxanol and Ativan as needed. Skin warm dry and intact. Incontinent of B/B. Per CG pt. is urinating without any issues. Changes briefs average of 6 x's day. Last BM 3/22/22. Appetite/ Fluid intake is good per PCG. Pt. eats 90%. Drinks plenty of liquids. Use a 10 cc syringe, gently rub pt's throat to encourage her to swallow. Medications reviewed with CG: No RF needed. Supplies:ordered. DME needed: None. No falls reported, bed rails up x 2 at all times. Safety precautions are being followed. Pt. is totally dependent for all care. MSW present during [address] today. Pt. has PCG 's that stay 12 hrs shifts during the day. Pt. is changed, turned and repositioned freq. Cg know how to reach [facility] for needs or concerns.

## 2022-03-24 PROCEDURE — 0651 HSPC ROUTINE HOME CARE

## 2022-03-25 ENCOUNTER — HOME CARE VISIT (OUTPATIENT)
Dept: SCHEDULING | Facility: HOME HEALTH | Age: 87
End: 2022-03-25
Payer: MEDICARE

## 2022-03-25 PROCEDURE — G0156 HHCP-SVS OF AIDE,EA 15 MIN: HCPCS

## 2022-03-25 PROCEDURE — 0651 HSPC ROUTINE HOME CARE

## 2022-03-26 PROCEDURE — 0651 HSPC ROUTINE HOME CARE

## 2022-03-27 PROCEDURE — 0651 HSPC ROUTINE HOME CARE

## 2022-03-28 ENCOUNTER — HOME CARE VISIT (OUTPATIENT)
Dept: SCHEDULING | Facility: HOME HEALTH | Age: 87
End: 2022-03-28
Payer: MEDICARE

## 2022-03-28 PROCEDURE — 0651 HSPC ROUTINE HOME CARE

## 2022-03-28 PROCEDURE — G0156 HHCP-SVS OF AIDE,EA 15 MIN: HCPCS

## 2022-03-29 PROCEDURE — 0651 HSPC ROUTINE HOME CARE

## 2022-03-30 ENCOUNTER — HOME CARE VISIT (OUTPATIENT)
Dept: SCHEDULING | Facility: HOME HEALTH | Age: 87
End: 2022-03-30
Payer: MEDICARE

## 2022-03-30 VITALS
SYSTOLIC BLOOD PRESSURE: 129 MMHG | TEMPERATURE: 97.6 F | HEART RATE: 60 BPM | DIASTOLIC BLOOD PRESSURE: 79 MMHG | OXYGEN SATURATION: 94 % | RESPIRATION RATE: 21 BRPM

## 2022-03-30 PROCEDURE — G0156 HHCP-SVS OF AIDE,EA 15 MIN: HCPCS

## 2022-03-30 PROCEDURE — G0299 HHS/HOSPICE OF RN EA 15 MIN: HCPCS

## 2022-03-30 PROCEDURE — 0651 HSPC ROUTINE HOME CARE

## 2022-03-30 NOTE — HOSPICE
Pt./ CG screened for COVID-19 Negative RN assessment completed. See ROS. Vitals assessed WNL's. O2 sat 94 % as per CG report. Pt. resting in bed on her right side HOB elevated at 35 degrees. She opened her eyes and responded when I spoke to her. Pt. continues to have episodes of anxiety/ pain for which CG uses Roxanol and Ativan as needed. Skin warm dry and intact. Incontinent of B/B. Per CG pt. is urinating without any issues. Changes briefs average of 6 x's day. Last BM 3/29/22. Appetite/ Fluid intake is good per PCG. Pt. eats 90%. Drinks plenty of liquids. Use a 10 cc syringe, gently rub pt's throat to encourage her to swallow. Medications reviewed with CG: No RF needed. Supplies:ordered. DME needed: None. No falls reported, bed rails up x 2 at all times. Safety precautions are being followed. Pt. is totally dependent for all care. MSW present during [address] today. Pt. has PCG 's that stay 12 hrs shifts during the day. Pt. is changed, turned and repositioned freq. Cg know how to reach [facility] for needs or concerns.

## 2022-03-31 PROCEDURE — 0651 HSPC ROUTINE HOME CARE

## 2022-04-01 ENCOUNTER — HOME CARE VISIT (OUTPATIENT)
Dept: SCHEDULING | Facility: HOME HEALTH | Age: 87
End: 2022-04-01
Payer: MEDICARE

## 2022-04-01 PROCEDURE — 0651 HSPC ROUTINE HOME CARE

## 2022-04-01 PROCEDURE — G0156 HHCP-SVS OF AIDE,EA 15 MIN: HCPCS

## 2022-04-02 PROCEDURE — 0651 HSPC ROUTINE HOME CARE

## 2022-04-03 PROCEDURE — 0651 HSPC ROUTINE HOME CARE

## 2022-04-04 ENCOUNTER — HOME CARE VISIT (OUTPATIENT)
Dept: HOSPICE | Facility: HOSPICE | Age: 87
End: 2022-04-04
Payer: MEDICARE

## 2022-04-04 PROCEDURE — 0651 HSPC ROUTINE HOME CARE

## 2022-04-05 ENCOUNTER — HOME CARE VISIT (OUTPATIENT)
Dept: SCHEDULING | Facility: HOME HEALTH | Age: 87
End: 2022-04-05
Payer: MEDICARE

## 2022-04-05 VITALS
HEART RATE: 58 BPM | OXYGEN SATURATION: 99 % | DIASTOLIC BLOOD PRESSURE: 78 MMHG | RESPIRATION RATE: 18 BRPM | TEMPERATURE: 98.3 F | SYSTOLIC BLOOD PRESSURE: 121 MMHG

## 2022-04-05 PROCEDURE — G0299 HHS/HOSPICE OF RN EA 15 MIN: HCPCS

## 2022-04-05 PROCEDURE — 0651 HSPC ROUTINE HOME CARE

## 2022-04-05 NOTE — HOSPICE
Pt./ CG screened for COVID-19 Negative RN assessment completed. See ROS. Vitals assessed WNL's. O2 sat 99 % as per CG report. Pt. resting in bed on her left side HOB elevated at 30 degrees. Pt. eyes were open. Pt. continues to have episodes of anxiety/ pain for which CG uses Roxanol and Ativan as needed. Skin warm dry and intact. Incontinent of B/B. Per CG pt. is urinating without any issues. Changes briefs average of 6 x's day. Last BM 4/4/22. Appetite/ Fluid intake is good per PCG. Pt. eats 90%. Drinks plenty of liquids. Use a 10 cc syringe, gently rub pt's throat to encourage her to swallow. Medications reviewed with CG: No RF needed. Supplies:ordered. DME needed: None. No falls reported, bed rails up x 2 at all times. Safety precautions are being followed. Pt. is totally dependent for all care. Pt. has PCG 's that stay 12 hrs shifts during the day. Pt. is changed, turned and repositioned freq. Cg know how to reach [facility] for needs or concerns.

## 2022-04-06 ENCOUNTER — HOME CARE VISIT (OUTPATIENT)
Dept: SCHEDULING | Facility: HOME HEALTH | Age: 87
End: 2022-04-06
Payer: MEDICARE

## 2022-04-06 PROCEDURE — G0156 HHCP-SVS OF AIDE,EA 15 MIN: HCPCS

## 2022-04-06 PROCEDURE — 0651 HSPC ROUTINE HOME CARE

## 2022-04-07 PROCEDURE — 0651 HSPC ROUTINE HOME CARE

## 2022-04-08 ENCOUNTER — HOME CARE VISIT (OUTPATIENT)
Dept: SCHEDULING | Facility: HOME HEALTH | Age: 87
End: 2022-04-08
Payer: MEDICARE

## 2022-04-08 PROCEDURE — G0156 HHCP-SVS OF AIDE,EA 15 MIN: HCPCS

## 2022-04-08 PROCEDURE — 0651 HSPC ROUTINE HOME CARE

## 2022-04-09 PROCEDURE — 0651 HSPC ROUTINE HOME CARE

## 2022-04-10 PROCEDURE — 0651 HSPC ROUTINE HOME CARE

## 2022-04-11 ENCOUNTER — HOME CARE VISIT (OUTPATIENT)
Dept: SCHEDULING | Facility: HOME HEALTH | Age: 87
End: 2022-04-11
Payer: MEDICARE

## 2022-04-11 ENCOUNTER — HOME CARE VISIT (OUTPATIENT)
Dept: HOSPICE | Facility: HOSPICE | Age: 87
End: 2022-04-11
Payer: MEDICARE

## 2022-04-11 PROCEDURE — G0155 HHCP-SVS OF CSW,EA 15 MIN: HCPCS

## 2022-04-11 PROCEDURE — 0651 HSPC ROUTINE HOME CARE

## 2022-04-11 PROCEDURE — G0156 HHCP-SVS OF AIDE,EA 15 MIN: HCPCS

## 2022-04-12 PROCEDURE — 0651 HSPC ROUTINE HOME CARE

## 2022-04-13 ENCOUNTER — HOME CARE VISIT (OUTPATIENT)
Dept: SCHEDULING | Facility: HOME HEALTH | Age: 87
End: 2022-04-13
Payer: MEDICARE

## 2022-04-13 VITALS
OXYGEN SATURATION: 99 % | DIASTOLIC BLOOD PRESSURE: 90 MMHG | HEART RATE: 60 BPM | SYSTOLIC BLOOD PRESSURE: 142 MMHG | TEMPERATURE: 97.3 F | RESPIRATION RATE: 22 BRPM

## 2022-04-13 PROCEDURE — 0651 HSPC ROUTINE HOME CARE

## 2022-04-13 PROCEDURE — G0299 HHS/HOSPICE OF RN EA 15 MIN: HCPCS

## 2022-04-13 PROCEDURE — G0156 HHCP-SVS OF AIDE,EA 15 MIN: HCPCS

## 2022-04-13 NOTE — HOSPICE
Rafael Martínez is resting quietly in the hospital bed. Her hired caregiver is present. They just complete breakfast and had her bath. She continues to recieve optimal care from  her family and caregivers. She has no changes or new concerns. She is non verbal today but alert. She appears to be coping well. SW spoke with hired caregiver and she reports them to be coping well.   Sw provided active listening and emotional support

## 2022-04-13 NOTE — HOSPICE
Pt. resting in bed on her back HOB elevated at 30 degrees. Pt. eyes were open. I asked pt. if she was ready for Easter and she nodded. She also has a 89th birthday coming up next week. RN assessment completed. See ROS. Vitals assessed WNL's. O2 sat 99 % as per CG report. Pt. continues to have episodes of anxiety/ pain for which CG uses Roxanol and Ativan as needed. Skin warm dry and intact. Incontinent of B/B. Per CG pt. is urinating without any issues. Changes briefs average of 6 x's day. Last BM 4/11/22. Appetite/ Fluid intake is good per PCG. Pt. eats 90%. Drinks plenty of liquids. Use a 10 cc syringe, gently rub pt's throat to encourage her to swallow. Medications reviewed with CG: No RF needed. Supplies:ordered. DME needed: None. No falls reported, bed rails up x 2 at all times. Safety precautions are being followed. Pt. is totally dependent for all care. Pt. has PCG 's that stay 12 hrs shifts during the day. Pt. is changed, turned and repositioned freq. Cg know how to reach [facility] for needs or concerns. Daughter has been sick with a stomach bug, sitter has been cleaning to keep the germs down.

## 2022-04-14 PROCEDURE — 0651 HSPC ROUTINE HOME CARE

## 2022-04-15 ENCOUNTER — HOME CARE VISIT (OUTPATIENT)
Dept: SCHEDULING | Facility: HOME HEALTH | Age: 87
End: 2022-04-15
Payer: MEDICARE

## 2022-04-15 PROCEDURE — 0651 HSPC ROUTINE HOME CARE

## 2022-04-15 PROCEDURE — G0156 HHCP-SVS OF AIDE,EA 15 MIN: HCPCS

## 2022-04-16 PROCEDURE — 0651 HSPC ROUTINE HOME CARE

## 2022-04-17 PROCEDURE — 0651 HSPC ROUTINE HOME CARE

## 2022-04-18 ENCOUNTER — HOME CARE VISIT (OUTPATIENT)
Dept: SCHEDULING | Facility: HOME HEALTH | Age: 87
End: 2022-04-18
Payer: MEDICARE

## 2022-04-18 PROCEDURE — 0651 HSPC ROUTINE HOME CARE

## 2022-04-18 PROCEDURE — G0156 HHCP-SVS OF AIDE,EA 15 MIN: HCPCS

## 2022-04-19 ENCOUNTER — HOME CARE VISIT (OUTPATIENT)
Dept: SCHEDULING | Facility: HOME HEALTH | Age: 87
End: 2022-04-19
Payer: MEDICARE

## 2022-04-19 VITALS
DIASTOLIC BLOOD PRESSURE: 68 MMHG | HEART RATE: 68 BPM | SYSTOLIC BLOOD PRESSURE: 124 MMHG | OXYGEN SATURATION: 98 % | RESPIRATION RATE: 21 BRPM | TEMPERATURE: 98.3 F

## 2022-04-19 PROCEDURE — 0651 HSPC ROUTINE HOME CARE

## 2022-04-19 PROCEDURE — G0299 HHS/HOSPICE OF RN EA 15 MIN: HCPCS

## 2022-04-19 NOTE — HOSPICE
Pt. resting in bed on her back HOB elevated at 30 degrees. Pt. eyes were open. .RN assessment completed. See ROS. Vitals assessed WNL's.LS Wheezing upper lobes O2 sat 98 % as per CG report. Pt. continues to have episodes of anxiety/ pain for which CG uses Roxanol and Ativan as needed. Skin warm dry and intact. Incontinent of B/B. Per CG pt. is urinating without any issues. Changes briefs average of 6 x's day. Last BM 4/18/22. Appetite/ Fluid intake is good per PCG. Pt. eats 90%. Drinks plenty of liquids. Use a 10 cc syringe, gently rub pt's throat to encourage her to swallow. Medications reviewed with CG: No RF needed. Supplies:None needed. DME needed: None. No falls reported, bed rails up x 2 at all times. Safety precautions are being followed. Pt. is totally dependent for all care. Pt. has PCG 's that stay 12 hrs shifts during the day. Pt. is changed, turned and repositioned freq. Cg knows how to reach [facility] for needs or concerns.

## 2022-04-20 ENCOUNTER — HOME CARE VISIT (OUTPATIENT)
Dept: SCHEDULING | Facility: HOME HEALTH | Age: 87
End: 2022-04-20
Payer: MEDICARE

## 2022-04-20 PROCEDURE — 0651 HSPC ROUTINE HOME CARE

## 2022-04-20 PROCEDURE — G0156 HHCP-SVS OF AIDE,EA 15 MIN: HCPCS

## 2022-04-21 PROCEDURE — 0651 HSPC ROUTINE HOME CARE

## 2022-04-22 ENCOUNTER — HOME CARE VISIT (OUTPATIENT)
Dept: SCHEDULING | Facility: HOME HEALTH | Age: 87
End: 2022-04-22
Payer: MEDICARE

## 2022-04-22 PROCEDURE — G0156 HHCP-SVS OF AIDE,EA 15 MIN: HCPCS

## 2022-04-22 PROCEDURE — 0651 HSPC ROUTINE HOME CARE

## 2022-04-23 PROCEDURE — 0651 HSPC ROUTINE HOME CARE

## 2022-04-24 PROCEDURE — 0651 HSPC ROUTINE HOME CARE

## 2022-04-25 ENCOUNTER — HOME CARE VISIT (OUTPATIENT)
Dept: SCHEDULING | Facility: HOME HEALTH | Age: 87
End: 2022-04-25
Payer: MEDICARE

## 2022-04-25 PROCEDURE — G0156 HHCP-SVS OF AIDE,EA 15 MIN: HCPCS

## 2022-04-25 PROCEDURE — 0651 HSPC ROUTINE HOME CARE

## 2022-04-26 PROCEDURE — 0651 HSPC ROUTINE HOME CARE

## 2022-04-27 ENCOUNTER — HOME CARE VISIT (OUTPATIENT)
Dept: SCHEDULING | Facility: HOME HEALTH | Age: 87
End: 2022-04-27
Payer: MEDICARE

## 2022-04-27 VITALS
TEMPERATURE: 98 F | RESPIRATION RATE: 21 BRPM | DIASTOLIC BLOOD PRESSURE: 85 MMHG | SYSTOLIC BLOOD PRESSURE: 126 MMHG | OXYGEN SATURATION: 95 % | HEART RATE: 58 BPM

## 2022-04-27 PROCEDURE — 0651 HSPC ROUTINE HOME CARE

## 2022-04-27 PROCEDURE — G0299 HHS/HOSPICE OF RN EA 15 MIN: HCPCS

## 2022-04-27 PROCEDURE — G0156 HHCP-SVS OF AIDE,EA 15 MIN: HCPCS

## 2022-04-27 NOTE — HOSPICE
Pt. resting in bed on her back HOB elevated at 30 degrees. Pt. eyes were open but pt. was snoring. RN assessment completed. See ROS. Vitals assessed WNL's. O2 sat 95 % as per CG report. Pt. continues to have episodes of anxiety/ pain for which CG uses Roxanol and Ativan as needed. Skin warm dry and intact. Incontinent of B/B. Per CG pt. is urinating without any issues. Changes briefs average of 6 x's day. Last BM 4/26/22. Appetite/ Fluid intake is good per PCG. Pt. eats 90%. Drinks plenty of liquids. Use a 10 cc syringe, gently rub pt's throat to encourage her to swallow. Medications reviewed with CG: No RF needed. Supplies: ordered DME needed: None. No falls reported, bed rails up x 2 at all times. Safety precautions are being followed. Pt. is totally dependent for all care. Pt. has PCG 's that stay 12 hrs shifts during the day. Pt. is changed, turned and repositioned freq. Cg knows how to reach [facility] for needs or concerns.

## 2022-04-28 PROCEDURE — 0651 HSPC ROUTINE HOME CARE

## 2022-04-29 ENCOUNTER — HOME CARE VISIT (OUTPATIENT)
Dept: SCHEDULING | Facility: HOME HEALTH | Age: 87
End: 2022-04-29
Payer: MEDICARE

## 2022-04-29 PROCEDURE — G0156 HHCP-SVS OF AIDE,EA 15 MIN: HCPCS

## 2022-04-29 PROCEDURE — 0651 HSPC ROUTINE HOME CARE

## 2022-04-30 PROCEDURE — 0651 HSPC ROUTINE HOME CARE

## 2022-05-01 PROCEDURE — 0651 HSPC ROUTINE HOME CARE

## 2022-05-02 ENCOUNTER — HOME CARE VISIT (OUTPATIENT)
Dept: SCHEDULING | Facility: HOME HEALTH | Age: 87
End: 2022-05-02
Payer: MEDICARE

## 2022-05-02 PROCEDURE — 0651 HSPC ROUTINE HOME CARE

## 2022-05-02 PROCEDURE — G0156 HHCP-SVS OF AIDE,EA 15 MIN: HCPCS

## 2022-05-03 PROCEDURE — 0651 HSPC ROUTINE HOME CARE

## 2022-05-04 ENCOUNTER — HOME CARE VISIT (OUTPATIENT)
Dept: SCHEDULING | Facility: HOME HEALTH | Age: 87
End: 2022-05-04
Payer: MEDICARE

## 2022-05-04 VITALS
TEMPERATURE: 97.8 F | DIASTOLIC BLOOD PRESSURE: 64 MMHG | HEART RATE: 65 BPM | OXYGEN SATURATION: 92 % | SYSTOLIC BLOOD PRESSURE: 102 MMHG | RESPIRATION RATE: 20 BRPM

## 2022-05-04 PROCEDURE — G0156 HHCP-SVS OF AIDE,EA 15 MIN: HCPCS

## 2022-05-04 PROCEDURE — 0651 HSPC ROUTINE HOME CARE

## 2022-05-04 PROCEDURE — G0299 HHS/HOSPICE OF RN EA 15 MIN: HCPCS

## 2022-05-04 NOTE — HOSPICE
Pt. resting in bed on her right side HOB elevated at 30 degrees. Pt. eyes were open but pt. was snoring. RN assessment completed. See ROS. Vitals assessed WNL's. O2 sat 92 % as per CG report. Pt. continues to have episodes of anxiety/ pain for which CG uses Roxanol and Ativan as needed. Skin warm dry and intact. Incontinent of B/B. Per CG pt. is urinating without any issues. Changes briefs average of 6 x's day. Last BM 5/3/22. Appetite/ Fluid intake is good per PCG. Pt. eats 90%. Drinks plenty of liquids. Use a 10 cc syringe, gently rub pt's throat to encourage her to swallow. Medications reviewed with CG: No RF needed. Supplies: ordered DME needed: None. No falls reported, bed rails up x 2 at all times. Safety precautions are being followed. Pt. is totally dependent for all care. Pt. has PCG 's that stay 12 hrs shifts during the day. Pt. is changed, turned and repositioned freq. Cg knows how to reach [facility] for needs or concerns.

## 2022-05-05 PROCEDURE — 0651 HSPC ROUTINE HOME CARE

## 2022-05-06 ENCOUNTER — HOME CARE VISIT (OUTPATIENT)
Dept: SCHEDULING | Facility: HOME HEALTH | Age: 87
End: 2022-05-06
Payer: MEDICARE

## 2022-05-06 PROCEDURE — 0651 HSPC ROUTINE HOME CARE

## 2022-05-06 PROCEDURE — G0156 HHCP-SVS OF AIDE,EA 15 MIN: HCPCS

## 2022-05-07 PROCEDURE — 0651 HSPC ROUTINE HOME CARE

## 2022-05-08 PROCEDURE — 0651 HSPC ROUTINE HOME CARE

## 2022-05-09 ENCOUNTER — HOME CARE VISIT (OUTPATIENT)
Dept: SCHEDULING | Facility: HOME HEALTH | Age: 87
End: 2022-05-09
Payer: MEDICARE

## 2022-05-09 PROCEDURE — 0651 HSPC ROUTINE HOME CARE

## 2022-05-09 PROCEDURE — G0156 HHCP-SVS OF AIDE,EA 15 MIN: HCPCS

## 2022-05-10 PROCEDURE — 0651 HSPC ROUTINE HOME CARE

## 2022-05-11 ENCOUNTER — HOME CARE VISIT (OUTPATIENT)
Dept: SCHEDULING | Facility: HOME HEALTH | Age: 87
End: 2022-05-11
Payer: MEDICARE

## 2022-05-11 VITALS
TEMPERATURE: 97.3 F | SYSTOLIC BLOOD PRESSURE: 132 MMHG | OXYGEN SATURATION: 94 % | HEART RATE: 58 BPM | RESPIRATION RATE: 22 BRPM | DIASTOLIC BLOOD PRESSURE: 81 MMHG

## 2022-05-11 PROCEDURE — 0651 HSPC ROUTINE HOME CARE

## 2022-05-11 PROCEDURE — G0156 HHCP-SVS OF AIDE,EA 15 MIN: HCPCS

## 2022-05-11 PROCEDURE — G0299 HHS/HOSPICE OF RN EA 15 MIN: HCPCS

## 2022-05-11 NOTE — HOSPICE
Pt. resting in bed on her back HOB elevated at 30 degrees. Pt. eyes were open but pt. was snoring. RN assessment completed. See ROS. Vitals assessed WNL's. O2 sat 94 % as per CG report. Pt. continues to have episodes of anxiety/ pain for which CG uses Roxanol and Ativan as needed. Skin warm dry and intact. Incontinent of B/B. Per CG pt. is urinating without any issues. Changes briefs average of 6 x's day. Last BM 5/10/22. Appetite/ Fluid intake is good per PCG. Pt. eats 90%. Drinks plenty of liquids. Use a 10 cc syringe, gently rub pt's throat to encourage her to swallow. Cg was concerned that pt. did not want breakfast this morning which normally eats everyday. I told her she may make up for it later. Medications reviewed with CG: No RF needed. Supplies: ordered DME needed: None. No falls reported, bed rails up x 2 at all times. Safety precautions are being followed. Pt. is totally dependent for all care. Pt. has PCG 's that stay 12 hrs shifts during the day. Pt. is changed, turned and repositioned freq. Cg knows how to reach [facility] for needs or concerns.

## 2022-05-12 PROCEDURE — 0651 HSPC ROUTINE HOME CARE

## 2022-05-13 ENCOUNTER — HOME CARE VISIT (OUTPATIENT)
Dept: SCHEDULING | Facility: HOME HEALTH | Age: 87
End: 2022-05-13
Payer: MEDICARE

## 2022-05-13 PROCEDURE — 0651 HSPC ROUTINE HOME CARE

## 2022-05-13 PROCEDURE — G0156 HHCP-SVS OF AIDE,EA 15 MIN: HCPCS

## 2022-05-14 PROCEDURE — 0651 HSPC ROUTINE HOME CARE

## 2022-05-15 PROCEDURE — 0651 HSPC ROUTINE HOME CARE

## 2022-05-16 ENCOUNTER — HOME CARE VISIT (OUTPATIENT)
Dept: SCHEDULING | Facility: HOME HEALTH | Age: 87
End: 2022-05-16
Payer: MEDICARE

## 2022-05-16 PROCEDURE — G0156 HHCP-SVS OF AIDE,EA 15 MIN: HCPCS

## 2022-05-16 PROCEDURE — 0651 HSPC ROUTINE HOME CARE

## 2022-05-17 ENCOUNTER — HOSPICE ADMISSION (OUTPATIENT)
Dept: HOSPICE | Facility: HOSPICE | Age: 87
End: 2022-05-17
Payer: MEDICARE

## 2022-05-17 PROCEDURE — 0651 HSPC ROUTINE HOME CARE

## 2022-05-18 ENCOUNTER — HOME CARE VISIT (OUTPATIENT)
Dept: SCHEDULING | Facility: HOME HEALTH | Age: 87
End: 2022-05-18
Payer: MEDICARE

## 2022-05-18 VITALS
DIASTOLIC BLOOD PRESSURE: 74 MMHG | HEART RATE: 56 BPM | SYSTOLIC BLOOD PRESSURE: 144 MMHG | RESPIRATION RATE: 14 BRPM | TEMPERATURE: 96.7 F

## 2022-05-18 PROCEDURE — G0299 HHS/HOSPICE OF RN EA 15 MIN: HCPCS

## 2022-05-18 PROCEDURE — G0156 HHCP-SVS OF AIDE,EA 15 MIN: HCPCS

## 2022-05-18 PROCEDURE — 0651 HSPC ROUTINE HOME CARE

## 2022-05-18 NOTE — HOSPICE
Met at door by paid caregiver. Patient's  asleep in living room; Og Coleman in hospital bed in front bedroom. Prachi aphasic but able to gesture to her needs. No distress noted. Caregiver stated that she was eating very well and that she had a bowel movement today. No other pertinent information. Og Coleman remains well cared for and is wearing clothes appropriate her size. Care plan reviewed and agreed upon by all parties. Supplies to be ordered. No medication needs or DME needs noted. Reminded caregiver (a former [facility] staff member) to call [facility] with any concerns, changes or needs.

## 2022-05-19 ENCOUNTER — HOME CARE VISIT (OUTPATIENT)
Dept: SCHEDULING | Facility: HOME HEALTH | Age: 87
End: 2022-05-19
Payer: MEDICARE

## 2022-05-19 PROCEDURE — 0651 HSPC ROUTINE HOME CARE

## 2022-05-20 ENCOUNTER — HOME CARE VISIT (OUTPATIENT)
Dept: SCHEDULING | Facility: HOME HEALTH | Age: 87
End: 2022-05-20
Payer: MEDICARE

## 2022-05-20 PROCEDURE — G0156 HHCP-SVS OF AIDE,EA 15 MIN: HCPCS

## 2022-05-20 PROCEDURE — 0651 HSPC ROUTINE HOME CARE

## 2022-05-21 PROCEDURE — 0651 HSPC ROUTINE HOME CARE

## 2022-05-22 PROCEDURE — 0651 HSPC ROUTINE HOME CARE

## 2022-05-23 ENCOUNTER — HOME CARE VISIT (OUTPATIENT)
Dept: SCHEDULING | Facility: HOME HEALTH | Age: 87
End: 2022-05-23
Payer: MEDICARE

## 2022-05-23 ENCOUNTER — HOME CARE VISIT (OUTPATIENT)
Dept: HOSPICE | Facility: HOSPICE | Age: 87
End: 2022-05-23
Payer: MEDICARE

## 2022-05-23 PROCEDURE — 0651 HSPC ROUTINE HOME CARE

## 2022-05-23 PROCEDURE — G0156 HHCP-SVS OF AIDE,EA 15 MIN: HCPCS

## 2022-05-23 PROCEDURE — G0155 HHCP-SVS OF CSW,EA 15 MIN: HCPCS

## 2022-05-24 PROCEDURE — 0651 HSPC ROUTINE HOME CARE

## 2022-05-25 ENCOUNTER — HOME CARE VISIT (OUTPATIENT)
Dept: SCHEDULING | Facility: HOME HEALTH | Age: 87
End: 2022-05-25
Payer: MEDICARE

## 2022-05-25 VITALS
RESPIRATION RATE: 20 BRPM | SYSTOLIC BLOOD PRESSURE: 132 MMHG | HEART RATE: 62 BPM | OXYGEN SATURATION: 99 % | DIASTOLIC BLOOD PRESSURE: 81 MMHG | TEMPERATURE: 97.3 F

## 2022-05-25 PROCEDURE — G0156 HHCP-SVS OF AIDE,EA 15 MIN: HCPCS

## 2022-05-25 PROCEDURE — G0299 HHS/HOSPICE OF RN EA 15 MIN: HCPCS

## 2022-05-25 PROCEDURE — 0651 HSPC ROUTINE HOME CARE

## 2022-05-25 NOTE — HOSPICE
Pt. resting in bed on her back HOB elevated at 30 degrees. Pt. was eating dinner when I arrved. RN assessment completed. See ROS. Vitals assessed WNL's. O2 sat 99 % as per CG report. Pt. continues to have episodes of anxiety/ pain for which CG uses Roxanol and Ativan as needed. Skin warm dry and intact. Incontinent of B/B. Per CG pt. is urinating without any issues. Changes briefs average of 6 x's day. Last BM 5/23/22. Appetite varies / Fluid intake is good per PCG. Pt. is not eating breakfast the past several weeks. Pt. eats about 80% Which is a change for pt. . Drinks plenty of liquids. Use a 10 cc syringe, gently rub pt's throat to encourage her to swallow. Medications reviewed with CG: No RF needed. Supplies: ordered DME needed: None. No falls reported, bed rails up x 2 at all times. Safety precautions are being followed. Pt. is totally dependent for all care. She is sleeping 20 + hrs/ day. PPS is 30 %., [facility] 25.0 cm. Pt. has PCG 's that stay 12 hrs shifts during the day. Pt. is changed, turned and repositioned freq.  Cg knows how to reach [facility] for needs or concerns

## 2022-05-26 PROCEDURE — 0651 HSPC ROUTINE HOME CARE

## 2022-05-26 NOTE — HOSPICE
Rancho Herrmann is bedbound and dependent for all care needs. Today her eyes are open but she does not interact today. Bakari Cruz is present and talkative. MUKUND provided active listening and emotional support. Bakari Cruz did some life review. Emory Em slept through the visit. They report coping well and deny any needs.   MUKUND continues to offer availability

## 2022-05-27 ENCOUNTER — HOME CARE VISIT (OUTPATIENT)
Dept: SCHEDULING | Facility: HOME HEALTH | Age: 87
End: 2022-05-27
Payer: MEDICARE

## 2022-05-27 PROCEDURE — G0156 HHCP-SVS OF AIDE,EA 15 MIN: HCPCS

## 2022-05-27 PROCEDURE — 0651 HSPC ROUTINE HOME CARE

## 2022-05-28 PROCEDURE — 0651 HSPC ROUTINE HOME CARE

## 2022-05-29 PROCEDURE — 0651 HSPC ROUTINE HOME CARE

## 2022-05-30 ENCOUNTER — HOME CARE VISIT (OUTPATIENT)
Dept: HOSPICE | Facility: HOSPICE | Age: 87
End: 2022-05-30
Payer: MEDICARE

## 2022-05-30 PROCEDURE — 0651 HSPC ROUTINE HOME CARE

## 2022-05-31 PROCEDURE — 0651 HSPC ROUTINE HOME CARE

## 2022-06-01 ENCOUNTER — HOME CARE VISIT (OUTPATIENT)
Dept: HOSPICE | Facility: HOSPICE | Age: 87
End: 2022-06-01
Payer: MEDICARE

## 2022-06-01 VITALS
DIASTOLIC BLOOD PRESSURE: 59 MMHG | HEART RATE: 55 BPM | RESPIRATION RATE: 14 BRPM | SYSTOLIC BLOOD PRESSURE: 117 MMHG | TEMPERATURE: 96.9 F

## 2022-06-01 PROCEDURE — G0299 HHS/HOSPICE OF RN EA 15 MIN: HCPCS

## 2022-06-01 PROCEDURE — 0651 HSPC ROUTINE HOME CARE

## 2022-06-01 PROCEDURE — G0156 HHCP-SVS OF AIDE,EA 15 MIN: HCPCS

## 2022-06-02 PROCEDURE — 0651 HSPC ROUTINE HOME CARE

## 2022-06-02 NOTE — HOSPICE
Met at door by Isabelle's , Crista Roblero. Amena, her caregiver is in the bedroom sitting beside Ms Uriel marinelli. Amena states that Torey just got a good bath and her hair washed. Patient is drowsy, and content,resting comfortably in bed under multiple covers snoozing well. Mentation seems slightly worse today; Ms Lema makes a face at me about her vital signs and is cuddling a small stuffed animal beneath her covers. Amena in able to calm her down and has been able to get her to eat well. She states that Torey had a cereal-size bowel of yogurt with cinnamon and sugar for breakfast and that they will be eating lunch soon as long as Torey does not sleep through it. Torey continues not to answer questions for me but will follow basic commands. Caregiver states that this is her baseline and that she will only talk at certain points. No signs of distress noted and patient resting comfortably upon me leaving. Plan of care reviewed with paid caregiver. No supplies or medications needed. She states \"we just got a big box of them yesterday. \" Reminded caregiver to call [facility] with any concerns, cares or needs between visits. Amena agreed and is well aware of what we do. She worked for [facility] at one time.

## 2022-06-03 ENCOUNTER — HOME CARE VISIT (OUTPATIENT)
Dept: SCHEDULING | Facility: HOME HEALTH | Age: 87
End: 2022-06-03
Payer: MEDICARE

## 2022-06-03 PROCEDURE — 0651 HSPC ROUTINE HOME CARE

## 2022-06-03 PROCEDURE — G0156 HHCP-SVS OF AIDE,EA 15 MIN: HCPCS

## 2022-06-04 PROCEDURE — 0651 HSPC ROUTINE HOME CARE

## 2022-06-05 PROCEDURE — 0651 HSPC ROUTINE HOME CARE

## 2022-06-06 ENCOUNTER — HOME CARE VISIT (OUTPATIENT)
Dept: SCHEDULING | Facility: HOME HEALTH | Age: 87
End: 2022-06-06
Payer: MEDICARE

## 2022-06-06 PROCEDURE — 0651 HSPC ROUTINE HOME CARE

## 2022-06-06 PROCEDURE — G0156 HHCP-SVS OF AIDE,EA 15 MIN: HCPCS

## 2022-06-07 PROCEDURE — 0651 HSPC ROUTINE HOME CARE

## 2022-06-08 ENCOUNTER — HOME CARE VISIT (OUTPATIENT)
Dept: SCHEDULING | Facility: HOME HEALTH | Age: 87
End: 2022-06-08
Payer: MEDICARE

## 2022-06-08 VITALS
SYSTOLIC BLOOD PRESSURE: 142 MMHG | RESPIRATION RATE: 15 BRPM | TEMPERATURE: 97.8 F | OXYGEN SATURATION: 99 % | HEART RATE: 50 BPM | DIASTOLIC BLOOD PRESSURE: 84 MMHG

## 2022-06-08 PROCEDURE — 0651 HSPC ROUTINE HOME CARE

## 2022-06-08 PROCEDURE — G0156 HHCP-SVS OF AIDE,EA 15 MIN: HCPCS

## 2022-06-08 PROCEDURE — G0299 HHS/HOSPICE OF RN EA 15 MIN: HCPCS

## 2022-06-08 ASSESSMENT — ENCOUNTER SYMPTOMS
SNORING: 1
STOOL DESCRIPTION: SOFT FORMED

## 2022-06-08 NOTE — HOSPICE
Pt. resting in bed on her back HOB elevated at 30 degrees with eyes closed. RN assessment completed. See ROS. Vitals assessed WNL's. O2 sat 99 % as per CG report. Pt. continues to have episodes of anxiety/ pain for which CG uses Roxanol and Ativan as needed. Skin warm dry and intact. Incontinent of B/B. Per CG pt. is urinating without any issues. Changes briefs average of 6 x's day. Last BM 6/8/22. Appetite varies / Fluid intake is good per PCG. Per Martina Amanda if pt, skips or eats less at breakfast she make up for it with the other meals. Pt. eats about 80% . Drinks plenty of liquids. Use a 10 cc syringe, gently rub pt's throat to encourage her to swallow. Medications reviewed with CG: No RF needed. Supplies: ordered DME needed: None. No falls reported, bed rails up x 2 at all times. Safety precautions are being followed. Pt. is totally dependent for all care. She is sleeping 20 + hrs/ day. PPS is 30 %., MAHC 25.0 cm. Pt. has PCG 's that stay 12 hrs shifts during the day. Pt. is changed, turned and repositioned freq. Kedar score  of 9 put pt. at high risk for skin breakdown. Cg knows how to reach [facility] for needs or concerns.

## 2022-06-09 PROCEDURE — 0651 HSPC ROUTINE HOME CARE

## 2022-06-10 ENCOUNTER — HOME CARE VISIT (OUTPATIENT)
Dept: SCHEDULING | Facility: HOME HEALTH | Age: 87
End: 2022-06-10
Payer: MEDICARE

## 2022-06-10 PROCEDURE — G0156 HHCP-SVS OF AIDE,EA 15 MIN: HCPCS

## 2022-06-10 PROCEDURE — 0651 HSPC ROUTINE HOME CARE

## 2022-06-11 PROCEDURE — 0651 HSPC ROUTINE HOME CARE

## 2022-06-12 PROCEDURE — 0651 HSPC ROUTINE HOME CARE

## 2022-06-13 ENCOUNTER — HOME CARE VISIT (OUTPATIENT)
Dept: SCHEDULING | Facility: HOME HEALTH | Age: 87
End: 2022-06-13
Payer: MEDICARE

## 2022-06-13 PROCEDURE — 0651 HSPC ROUTINE HOME CARE

## 2022-06-13 PROCEDURE — G0156 HHCP-SVS OF AIDE,EA 15 MIN: HCPCS

## 2022-06-14 PROCEDURE — 0651 HSPC ROUTINE HOME CARE

## 2022-06-15 ENCOUNTER — HOME CARE VISIT (OUTPATIENT)
Dept: SCHEDULING | Facility: HOME HEALTH | Age: 87
End: 2022-06-15
Payer: MEDICARE

## 2022-06-15 VITALS
OXYGEN SATURATION: 94 % | SYSTOLIC BLOOD PRESSURE: 135 MMHG | TEMPERATURE: 98.5 F | DIASTOLIC BLOOD PRESSURE: 81 MMHG | HEART RATE: 60 BPM | RESPIRATION RATE: 21 BRPM

## 2022-06-15 PROCEDURE — G0299 HHS/HOSPICE OF RN EA 15 MIN: HCPCS

## 2022-06-15 PROCEDURE — 0651 HSPC ROUTINE HOME CARE

## 2022-06-15 PROCEDURE — G0156 HHCP-SVS OF AIDE,EA 15 MIN: HCPCS

## 2022-06-15 ASSESSMENT — ENCOUNTER SYMPTOMS
SNORING: 1
STOOL DESCRIPTION: SOFT FORMED
TROUBLE SWALLOWING: 1

## 2022-06-15 NOTE — HOSPICE
Pt. resting in bed on her back HOB elevated at 70 degrees with eyes open. PCG starting to feed pt. RN assessment completed. See ROS. Vitals assessed WNL's. O2 sat 94% as per CG report. Pt. continues to have episodes of anxiety/ pain for which CG uses Roxanol and Ativan as needed. Skin warm dry and intact. Incontinent of B/B. Per CG pt. is urinating without any issues. Changes briefs average of 6 x's day. Last BM 6/15/22. Appetite varies / Fluid intake is good per PCG. Pt. eats about 90% . Drinks plenty of liquids. Use a 10 cc syringe, gently rub pt's throat to encourage her to swallow. Pt. did get strangled 2 x's today during the meal. Per PCG this does not normally happen. Medications reviewed with CG: No RF needed. Supplies: ordered DME needed: None. No falls reported, bed rails up x 2 at all times. Safety precautions are being followed. Pt. is totally dependent for all care. She is sleeping 20 + hrs/ day. PPS is 30 %., MAHC 25.0 cm. Pt. has PCG 's that stay 12 hrs shifts during the day. Pt. is changed, turned and repositioned freq. Kedar score of 9 put pt. at high risk for skin breakdown. Cg knows how to reach [facility] for needs or concerns. 24/7.

## 2022-06-16 ENCOUNTER — HOME CARE VISIT (OUTPATIENT)
Dept: HOSPICE | Facility: HOSPICE | Age: 87
End: 2022-06-16
Payer: MEDICARE

## 2022-06-16 PROCEDURE — 0651 HSPC ROUTINE HOME CARE

## 2022-06-17 ENCOUNTER — HOME CARE VISIT (OUTPATIENT)
Dept: SCHEDULING | Facility: HOME HEALTH | Age: 87
End: 2022-06-17
Payer: MEDICARE

## 2022-06-17 PROCEDURE — 0651 HSPC ROUTINE HOME CARE

## 2022-06-17 PROCEDURE — G0156 HHCP-SVS OF AIDE,EA 15 MIN: HCPCS

## 2022-06-18 PROCEDURE — 0651 HSPC ROUTINE HOME CARE

## 2022-06-19 PROCEDURE — 0651 HSPC ROUTINE HOME CARE

## 2022-06-20 ENCOUNTER — HOME CARE VISIT (OUTPATIENT)
Dept: SCHEDULING | Facility: HOME HEALTH | Age: 87
End: 2022-06-20
Payer: MEDICARE

## 2022-06-20 PROCEDURE — G0156 HHCP-SVS OF AIDE,EA 15 MIN: HCPCS

## 2022-06-20 PROCEDURE — 0651 HSPC ROUTINE HOME CARE

## 2022-06-21 PROCEDURE — 0651 HSPC ROUTINE HOME CARE

## 2022-06-22 ENCOUNTER — HOME CARE VISIT (OUTPATIENT)
Dept: SCHEDULING | Facility: HOME HEALTH | Age: 87
End: 2022-06-22
Payer: MEDICARE

## 2022-06-22 VITALS
HEART RATE: 50 BPM | DIASTOLIC BLOOD PRESSURE: 76 MMHG | TEMPERATURE: 97.5 F | OXYGEN SATURATION: 97 % | RESPIRATION RATE: 21 BRPM | SYSTOLIC BLOOD PRESSURE: 122 MMHG

## 2022-06-22 PROCEDURE — 0651 HSPC ROUTINE HOME CARE

## 2022-06-22 PROCEDURE — G0299 HHS/HOSPICE OF RN EA 15 MIN: HCPCS

## 2022-06-22 PROCEDURE — G0156 HHCP-SVS OF AIDE,EA 15 MIN: HCPCS

## 2022-06-22 ASSESSMENT — ENCOUNTER SYMPTOMS
STOOL DESCRIPTION: FORMED
TROUBLE SWALLOWING: 1
SNORING: 1

## 2022-06-22 NOTE — HOSPICE
Comprehensive Skilled Nursing visit completed. Pt. resting in bed on her back HOB elevated at 45 degrees with eyes open. RN assessment completed. See ROS. Vitals assessed WNL's. O2 sat 97% as per CG report. Pt. continues to have episodes of anxiety/ pain for which CG uses Roxanol and Ativan as needed. Skin warm dry and intact. Incontinent of B/B. Per CG pt. is urinating without any issues. Changes briefs average of 6 x's day. Last BM 6/21/22. Appetite varies / Fluid intake is good per PCG. Pt. eats about 90% . Drinks plenty of liquids. Use a 10 cc syringe, gently rub pt's throat to encourage her to swallow. Pt. did get strangled 2 x's today during the meal. Per PCG this does not normally happen. Medications reviewed with CG: No RF needed. Supplies: None ordered. DME needed: None. No falls reported, bed rails up x 2 at all times. Safety precautions are being followed. Pt. is totally dependent for all care. She is sleeping 20 + hrs/ day. PPS is 30 %., MAHC 25.0 cm. Pt. has PCG 's that stay 12 hrs shifts during the day. Pt. is changed, turned and repositioned freq. Kedar score of 12 put pt. at high risk for skin breakdown. Cg knows how to reach [facility] for needs or concerns. 24/7.

## 2022-06-23 PROCEDURE — 0651 HSPC ROUTINE HOME CARE

## 2022-06-24 ENCOUNTER — HOME CARE VISIT (OUTPATIENT)
Dept: SCHEDULING | Facility: HOME HEALTH | Age: 87
End: 2022-06-24
Payer: MEDICARE

## 2022-06-24 PROCEDURE — 0651 HSPC ROUTINE HOME CARE

## 2022-06-24 PROCEDURE — G0156 HHCP-SVS OF AIDE,EA 15 MIN: HCPCS

## 2022-06-25 PROCEDURE — 0651 HSPC ROUTINE HOME CARE

## 2022-06-26 PROCEDURE — 0651 HSPC ROUTINE HOME CARE

## 2022-06-27 ENCOUNTER — HOME CARE VISIT (OUTPATIENT)
Dept: SCHEDULING | Facility: HOME HEALTH | Age: 87
End: 2022-06-27
Payer: MEDICARE

## 2022-06-27 PROCEDURE — 0651 HSPC ROUTINE HOME CARE

## 2022-06-27 PROCEDURE — G0156 HHCP-SVS OF AIDE,EA 15 MIN: HCPCS

## 2022-06-28 ENCOUNTER — HOME CARE VISIT (OUTPATIENT)
Dept: HOSPICE | Facility: HOSPICE | Age: 87
End: 2022-06-28
Payer: MEDICARE

## 2022-06-28 PROCEDURE — G0155 HHCP-SVS OF CSW,EA 15 MIN: HCPCS

## 2022-06-28 PROCEDURE — 0651 HSPC ROUTINE HOME CARE

## 2022-06-29 ENCOUNTER — HOME CARE VISIT (OUTPATIENT)
Dept: SCHEDULING | Facility: HOME HEALTH | Age: 87
End: 2022-06-29
Payer: MEDICARE

## 2022-06-29 VITALS
RESPIRATION RATE: 21 BRPM | HEART RATE: 66 BPM | SYSTOLIC BLOOD PRESSURE: 130 MMHG | TEMPERATURE: 98.1 F | DIASTOLIC BLOOD PRESSURE: 81 MMHG

## 2022-06-29 PROCEDURE — G0299 HHS/HOSPICE OF RN EA 15 MIN: HCPCS

## 2022-06-29 PROCEDURE — G0156 HHCP-SVS OF AIDE,EA 15 MIN: HCPCS

## 2022-06-29 PROCEDURE — 0651 HSPC ROUTINE HOME CARE

## 2022-06-29 ASSESSMENT — ENCOUNTER SYMPTOMS
SNORING: 1
STOOL DESCRIPTION: FORMED
BOWEL INCONTINENCE: 1

## 2022-06-29 NOTE — HOSPICE
Comprehensive Skilled Nursing visit completed. Pt. resting in bed on her right side. HOB elevated at 45 degrees with eyes open. RN assessment completed. See ROS. Vitals assessed WNL's. Pt. continues to have episodes of anxiety/ pain for which CG uses Roxanol and Ativan as needed. Skin warm dry and intact. Incontinent of B/B. Per CG pt. is urinating without any issues. Changes briefs average of 6 x's day. Last BM 6/29/22. Appetite varies / Fluid intake is good per PCG. Pt. eats about 90% . Drinks plenty of liquids. Use a 10 cc syringe, gently rub pt's throat to encourage her to swallow. Medications reviewed with CG: No RF needed. Supplies: None ordered. DME needed: None. No falls reported, bed rails up x 2 at all times. Safety precautions are being followed. Pt. is totally dependent for all care. She is sleeping 20 + hrs/ day. PPS is 30 %., MAHC 19.0 cm. Pt. has PCG 's that stay 12 hrs shifts during the day. Pt. is changed, turned and repositioned freq. Kedar score of 12 put pt. at high risk for skin breakdown. Cg knows how to reach [facility] for needs or concerns. 24/7.

## 2022-06-30 PROCEDURE — 0651 HSPC ROUTINE HOME CARE

## 2022-07-01 ENCOUNTER — HOME CARE VISIT (OUTPATIENT)
Dept: SCHEDULING | Facility: HOME HEALTH | Age: 87
End: 2022-07-01
Payer: MEDICARE

## 2022-07-01 PROCEDURE — G0156 HHCP-SVS OF AIDE,EA 15 MIN: HCPCS

## 2022-07-01 PROCEDURE — 0651 HSPC ROUTINE HOME CARE

## 2022-07-01 ASSESSMENT — ENCOUNTER SYMPTOMS: HEMOPTYSIS: 0

## 2022-07-01 NOTE — HOSPICE
Kimmie isn't up yet for the day. Eliana Braun is watching TV. Zane Franco and Ernie Kwok are in her bedroom. Ernie Kwok is alert but is not interactive. She will not make eye contact either. Zane Franco has been her hired caregiver for a couple of years now. She is talkative and expressive. She says they have had no concerns of late with Ernie Kwok and denies any new needs. SW offered emotional support  and active listening.

## 2022-07-02 PROCEDURE — 0651 HSPC ROUTINE HOME CARE

## 2022-07-03 PROCEDURE — 0651 HSPC ROUTINE HOME CARE

## 2022-07-04 ENCOUNTER — HOME CARE VISIT (OUTPATIENT)
Dept: HOSPICE | Facility: HOSPICE | Age: 87
End: 2022-07-04
Payer: MEDICARE

## 2022-07-04 PROCEDURE — 0651 HSPC ROUTINE HOME CARE

## 2022-07-05 PROCEDURE — 0651 HSPC ROUTINE HOME CARE

## 2022-07-06 ENCOUNTER — HOME CARE VISIT (OUTPATIENT)
Dept: HOSPICE | Facility: HOSPICE | Age: 87
End: 2022-07-06
Payer: MEDICARE

## 2022-07-06 PROCEDURE — 0651 HSPC ROUTINE HOME CARE

## 2022-07-06 NOTE — HOSPICE
H I called pt's daughter  because  the A had text me to let me know she was not going to see pt. today. Kimmie tested positive or [address]. She is 4 days in. Still has a fever. Per Trinh Mejía the sitter came in sick and she sent her home. She is wearing an N95 mask when in close proximity to her parents. They are ok at this time. Trinh Mejía does not want [facility] staff to come probably all of this week unless she is better by Friday and she will call and let us know.

## 2022-07-07 PROCEDURE — 0651 HSPC ROUTINE HOME CARE

## 2022-07-08 ENCOUNTER — HOME CARE VISIT (OUTPATIENT)
Dept: SCHEDULING | Facility: HOME HEALTH | Age: 87
End: 2022-07-08
Payer: MEDICARE

## 2022-07-08 ENCOUNTER — HOME CARE VISIT (OUTPATIENT)
Dept: HOSPICE | Facility: HOSPICE | Age: 87
End: 2022-07-08
Payer: MEDICARE

## 2022-07-08 PROCEDURE — 0651 HSPC ROUTINE HOME CARE

## 2022-07-09 PROCEDURE — 0651 HSPC ROUTINE HOME CARE

## 2022-07-10 PROCEDURE — 0651 HSPC ROUTINE HOME CARE

## 2022-07-11 ENCOUNTER — HOME CARE VISIT (OUTPATIENT)
Dept: SCHEDULING | Facility: HOME HEALTH | Age: 87
End: 2022-07-11
Payer: MEDICARE

## 2022-07-11 PROCEDURE — G0156 HHCP-SVS OF AIDE,EA 15 MIN: HCPCS

## 2022-07-11 PROCEDURE — 0651 HSPC ROUTINE HOME CARE

## 2022-07-12 PROCEDURE — 0651 HSPC ROUTINE HOME CARE

## 2022-07-13 ENCOUNTER — HOME CARE VISIT (OUTPATIENT)
Dept: SCHEDULING | Facility: HOME HEALTH | Age: 87
End: 2022-07-13
Payer: MEDICARE

## 2022-07-13 VITALS — HEART RATE: 69 BPM | RESPIRATION RATE: 18 BRPM | TEMPERATURE: 97.9 F | OXYGEN SATURATION: 94 %

## 2022-07-13 PROCEDURE — 0651 HSPC ROUTINE HOME CARE

## 2022-07-13 PROCEDURE — G0299 HHS/HOSPICE OF RN EA 15 MIN: HCPCS

## 2022-07-13 PROCEDURE — G0156 HHCP-SVS OF AIDE,EA 15 MIN: HCPCS

## 2022-07-13 ASSESSMENT — ENCOUNTER SYMPTOMS
BOWEL INCONTINENCE: 1
TROUBLE SWALLOWING: 1
SNORING: 1
STOOL DESCRIPTION: FORMED

## 2022-07-13 NOTE — HOSPICE
Comprehensive Skilled Nursing visit completed. Pt. tested positive for COVID on 7/11/22. Full PPE worn for HV. Everyone in the home has had it. Pt. resting in bed on her right side. HOB elevated at 45 degrees with eyes open. RN assessment completed. See ROS. Vitals assessed WNL's. Pt. continues to have episodes of anxiety/ pain for which CG uses Roxanol and Ativan as needed. CG reports she has not used recently. Skin warm dry and intact. Incontinent of B/B. Per CG pt. is urinating without any issues. Changes briefs average of 6 x's day. Last BM 7/13/22. Appetite  / Fluid intake has been off due to COVID per PCG. Pt. usually eats about 90% . Drinks plenty of liquids. Use a 10 cc syringe, gently rub pt's throat to encourage her to swallow. Scar Evangelista has been eating applesauce and drinking sips of liquids today. Medications reviewed with CG: No RF needed. Supplies: None ordered. DME needed: None. No falls reported, bed rails up x 2 at all times. Safety precautions are being followed. Pt. is totally dependent for all care. She is sleeping 20 + hrs/ day. PPS is 30 %., MAHC 19.0 cm. Pt. normally has PCG 12 hrs./day but they have both been sick as well. Pt. has been changed, turned and repositioned freq. Kedar score of 12 put pt. at high risk for skin breakdown. Cg knows how to reach [facility] for needs or concerns. 24/7.

## 2022-07-14 PROCEDURE — 0651 HSPC ROUTINE HOME CARE

## 2022-07-15 ENCOUNTER — HOME CARE VISIT (OUTPATIENT)
Dept: SCHEDULING | Facility: HOME HEALTH | Age: 87
End: 2022-07-15
Payer: MEDICARE

## 2022-07-15 PROCEDURE — 0651 HSPC ROUTINE HOME CARE

## 2022-07-15 PROCEDURE — G0156 HHCP-SVS OF AIDE,EA 15 MIN: HCPCS

## 2022-07-16 PROCEDURE — 0651 HSPC ROUTINE HOME CARE

## 2022-07-17 PROCEDURE — 0651 HSPC ROUTINE HOME CARE

## 2022-07-18 ENCOUNTER — HOME CARE VISIT (OUTPATIENT)
Dept: SCHEDULING | Facility: HOME HEALTH | Age: 87
End: 2022-07-18
Payer: MEDICARE

## 2022-07-18 PROCEDURE — 0651 HSPC ROUTINE HOME CARE

## 2022-07-18 PROCEDURE — G0156 HHCP-SVS OF AIDE,EA 15 MIN: HCPCS

## 2022-07-19 PROCEDURE — 0651 HSPC ROUTINE HOME CARE

## 2022-07-20 ENCOUNTER — HOME CARE VISIT (OUTPATIENT)
Dept: SCHEDULING | Facility: HOME HEALTH | Age: 87
End: 2022-07-20
Payer: MEDICARE

## 2022-07-20 VITALS — HEART RATE: 86 BPM | TEMPERATURE: 97.1 F | OXYGEN SATURATION: 97 % | RESPIRATION RATE: 18 BRPM

## 2022-07-20 PROCEDURE — 0651 HSPC ROUTINE HOME CARE

## 2022-07-20 PROCEDURE — G0156 HHCP-SVS OF AIDE,EA 15 MIN: HCPCS

## 2022-07-20 PROCEDURE — G0299 HHS/HOSPICE OF RN EA 15 MIN: HCPCS

## 2022-07-20 ASSESSMENT — ENCOUNTER SYMPTOMS
COUGH: 1
COUGH CHARACTERISTICS: PRODUCTIVE
BOWEL INCONTINENCE: 1
SNORING: 1
STOOL DESCRIPTION: LOOSE
TROUBLE SWALLOWING: 1

## 2022-07-20 NOTE — HOSPICE
Comprehensive Skilled Nursing visit completed. Pt. tested positive for COVID on 7/11/22. Full PPE worn for HV pt will be 10 days out and pt. is afebrile. 7/21/22. Pt. resting in bed on her right side. HOB elevated at 45 degrees with eyes open. RN assessment completed. See ROS. Vitals assessed WNL's. Pt. continues to have episodes of anxiety/ pain for which CG uses Roxanol and Ativan as needed. CG reports she has not used recently. Skin warm dry and intact. Incontinent of B/B. Per CG pt. is urinating without any issues. Changes briefs average of 6 x's day. Last BM 7/19/22. Appetite / Fluid intake has been off due to COVID per PCG. Pt. usually eats about 90% but since she has been sick 50 % . Drinks plenty of liquids. Use a 10 cc syringe, gently rub pt's throat to encourage her to swallow. Scar Evangelista has been eating bites of applesauce and drinking sips of liquids today. Medications reviewed with CG: No RF needed CG did ask if she could give her mom Mucinex liquid. I called Sharon Allen NP to ask and obtain verbal order. Mucinex Fast max 20 ml PO q 6 hrs to help with chest congestion, Supplies: ordered. . DME needed: None. No falls reported, bed rails up x 2 at all times. Safety precautions are being followed. Pt. is totally dependent for all care. She is sleeping 20 + hrs/ day. PPS is 30 %., MAHC 19.0 cm. Pt. normally has PCG 12 hrs./day but they have both been sick as well. Pt. has been changed, turned and repositioned freq. Kedar score of 12 put pt. at high risk for skin breakdown. Cg knows how to reach [facility] for needs or concerns. 24/7.

## 2022-07-21 PROCEDURE — 0651 HSPC ROUTINE HOME CARE

## 2022-07-22 ENCOUNTER — HOME CARE VISIT (OUTPATIENT)
Dept: SCHEDULING | Facility: HOME HEALTH | Age: 87
End: 2022-07-22
Payer: MEDICARE

## 2022-07-22 PROCEDURE — 0651 HSPC ROUTINE HOME CARE

## 2022-07-22 PROCEDURE — G0156 HHCP-SVS OF AIDE,EA 15 MIN: HCPCS

## 2022-07-23 PROCEDURE — 0651 HSPC ROUTINE HOME CARE

## 2022-07-24 PROCEDURE — 0651 HSPC ROUTINE HOME CARE

## 2022-07-25 ENCOUNTER — HOME CARE VISIT (OUTPATIENT)
Dept: SCHEDULING | Facility: HOME HEALTH | Age: 87
End: 2022-07-25
Payer: MEDICARE

## 2022-07-25 PROCEDURE — G0156 HHCP-SVS OF AIDE,EA 15 MIN: HCPCS

## 2022-07-25 PROCEDURE — 0651 HSPC ROUTINE HOME CARE

## 2022-07-26 PROCEDURE — 0651 HSPC ROUTINE HOME CARE

## 2022-07-27 ENCOUNTER — HOME CARE VISIT (OUTPATIENT)
Dept: SCHEDULING | Facility: HOME HEALTH | Age: 87
End: 2022-07-27
Payer: MEDICARE

## 2022-07-27 VITALS
OXYGEN SATURATION: 100 % | HEART RATE: 67 BPM | TEMPERATURE: 99.1 F | DIASTOLIC BLOOD PRESSURE: 78 MMHG | SYSTOLIC BLOOD PRESSURE: 129 MMHG

## 2022-07-27 PROCEDURE — G0299 HHS/HOSPICE OF RN EA 15 MIN: HCPCS

## 2022-07-27 PROCEDURE — 0651 HSPC ROUTINE HOME CARE

## 2022-07-27 PROCEDURE — G0156 HHCP-SVS OF AIDE,EA 15 MIN: HCPCS

## 2022-07-27 ASSESSMENT — ENCOUNTER SYMPTOMS
STOOL DESCRIPTION: LOOSE
TROUBLE SWALLOWING: 1
SNORING: 1
RESPIRATORY PAIN: 1
BOWEL INCONTINENCE: 1

## 2022-07-28 ENCOUNTER — HOME CARE VISIT (OUTPATIENT)
Dept: HOSPICE | Facility: HOSPICE | Age: 87
End: 2022-07-28
Payer: MEDICARE

## 2022-07-28 PROCEDURE — 0651 HSPC ROUTINE HOME CARE

## 2022-07-28 NOTE — HOSPICE
Jessica Schumacher 27MX female remains eligible for hospice services based on the following factors:              80 yr. old female admitted to hospice services on 10/7/19 with a Dx: of Alzheimer's disease/ CRF. She is her 16th benefit period which will end on 7/27/22. Dajuan Scruggs lives with spouse in their home. Daughter and PCG's care for Dajuan Scruggs. As of 7/11/22 pt. tested positive for COVID. The only symptoms she has exhibited are fever and malaise and now productive cough. Pt. has been febrile at times. but Daughter repeated COVID test on 7/21/22 and pt. continues to test positive and running a low grade fever. Pt. has been bedbound since admission, unless transferred to w/c. Pt. sleeps 20 + hrs. a day. Pt. is totally dependent for all ADL's. At admission her PPS was 40 % and now 30 %. Upper extremities are contracted and lower are stiff. Pt. has paid CG's that turn and reposition her q 2 hrs. Her skin is very fragile but intact. She has a purple spot on 2 x 1 cm intact. on sacral area. Zinc paste applied. Incontinent of bowels and bladder wears briefs. MUAC on admission 24cm is now 22cm. Other signs of weight loss are her sunken cheek bones, bitemporal wasting, prominent clavicles and bony prominences. Dajuan Scruggs was eating 90% of child sized plate and now since Covid 75%, her foods must be very soft or pureed. Pt. must be fed it can take 2 hrs. for her to eat. Fluid intake has always been good, CG's use 10 cc syringe. Pt. is primarily non- verbal but will occasionally speak one or two words but if you ask her a question, she will respond with a nod. Dajuan Scruggs has liquid meds for prn use. Pt. has no regular daily meds. Miguel Freed NP gave order for Tylenol 15ml q 6 hrs. prn for mild pain and fever. Mucinex Fast Max liquid 20mls. q 6hrs prn cough. This did not help so they are using Tylenol cold and flu 15 ml BID. Dulcolax suppositories for prn use of constipation. She has Morphine liquid 5 mg. q 4 hrs.  prn pain or SOB and Ativan 1 mg. q 2 hrs. prn anxiety in the home. Josphine Pea will administer but does not like to use. Review with CG's q visit for safety measures and precautions. The relationship between the son and daughter is strained. Chiqui Lugo is planning to hire a 3 rd shift person. Medications reconciled with provider, care plans discussed within ID[facility] and approved by patient and caregiver. Family in agreement with symptom management and focus on comfort. Education provided to family regarding changes in patient condition, and disease progression.  and MSW involved and providing good support. Plan for next benefit period is medication management, adjusting as needed for changes in swallowing ability. Monitor effectiveness of pain medication, anxiety medication.   SN 1x week  HHA 3 x week

## 2022-07-29 ENCOUNTER — HOME CARE VISIT (OUTPATIENT)
Dept: SCHEDULING | Facility: HOME HEALTH | Age: 87
End: 2022-07-29
Payer: MEDICARE

## 2022-07-29 PROCEDURE — 0651 HSPC ROUTINE HOME CARE

## 2022-07-29 PROCEDURE — G0156 HHCP-SVS OF AIDE,EA 15 MIN: HCPCS

## 2022-07-30 PROCEDURE — 0651 HSPC ROUTINE HOME CARE

## 2022-07-31 PROCEDURE — 0651 HSPC ROUTINE HOME CARE

## 2022-08-01 ENCOUNTER — HOME CARE VISIT (OUTPATIENT)
Dept: SCHEDULING | Facility: HOME HEALTH | Age: 87
End: 2022-08-01
Payer: MEDICARE

## 2022-08-01 ENCOUNTER — HOME CARE VISIT (OUTPATIENT)
Dept: HOSPICE | Facility: HOSPICE | Age: 87
End: 2022-08-01
Payer: MEDICARE

## 2022-08-01 PROCEDURE — 0651 HSPC ROUTINE HOME CARE

## 2022-08-01 PROCEDURE — G0155 HHCP-SVS OF CSW,EA 15 MIN: HCPCS

## 2022-08-01 PROCEDURE — G0156 HHCP-SVS OF AIDE,EA 15 MIN: HCPCS

## 2022-08-01 ASSESSMENT — ENCOUNTER SYMPTOMS
HEMOPTYSIS: 0
HEMOPTYSIS: 0

## 2022-08-02 PROCEDURE — 0651 HSPC ROUTINE HOME CARE

## 2022-08-03 ENCOUNTER — HOME CARE VISIT (OUTPATIENT)
Dept: SCHEDULING | Facility: HOME HEALTH | Age: 87
End: 2022-08-03
Payer: MEDICARE

## 2022-08-03 VITALS — RESPIRATION RATE: 18 BRPM | HEART RATE: 70 BPM | TEMPERATURE: 98.4 F

## 2022-08-03 PROCEDURE — G0156 HHCP-SVS OF AIDE,EA 15 MIN: HCPCS

## 2022-08-03 PROCEDURE — G0299 HHS/HOSPICE OF RN EA 15 MIN: HCPCS

## 2022-08-03 PROCEDURE — 0651 HSPC ROUTINE HOME CARE

## 2022-08-03 ASSESSMENT — ENCOUNTER SYMPTOMS
HEMOPTYSIS: 0
STOOL DESCRIPTION: SOFT FORMED
BOWEL INCONTINENCE: 1

## 2022-08-03 NOTE — HOSPICE
Skilled Nursing Comprehensive visit completed: Jamal Schmitz lives with spouse in their home. Daughter and PCG's care for Jamal Schmitz. She has been afebrile since the weekend. Pt. has been bedbound and is totally dependent for all ADLS. Pt. sleeps 20 + hrs. a day. Pt. is totally dependent for all ADL's. At admission her PPS was 40 % and now 30 %. Upper extremities are contracted and lower are stiff. Pt. has paid CG's that turn and reposition her q 2 hrs. Her skin is very fragile but intact. She has a purple spot on 2 x 1 cm intact. on sacral area. Zinc paste applied. Incontinent of bowels and bladder wears briefs. MUAC 21.0 cm. Other signs of weight loss are her sunken cheek bones, bitemporal wasting, prominent clavicles and bony prominences. Per Servando Rox is eating 75 % of her normal amt. Her foods must be very soft or pureed. Pt. must be fed it can take 2 hrs. for her to eat. Fluid intake has always been good, CG's use 10 cc syringe. Pt. is primarily non- verbal but will occasionally speak one or two words but if you ask her a question, she will respond with a nod. Jamal Schmitz has liquid meds for prn use. Pt. has no regular daily meds. Jenn Tran will administer if necessay but does not like to use. Review with CG's q visit for safety measures and precautions. I inquired about 3rd shift sitter and per Jenn Tran they are not hiring a 3rd. shift person, she will do it. I reviewed importance of  keeping her clean and dry and turning her at least once during the night. CG knows how to reach [facility] for any needs or concerns.

## 2022-08-04 PROCEDURE — 0651 HSPC ROUTINE HOME CARE

## 2022-08-05 ENCOUNTER — HOME CARE VISIT (OUTPATIENT)
Dept: SCHEDULING | Facility: HOME HEALTH | Age: 87
End: 2022-08-05
Payer: MEDICARE

## 2022-08-05 PROCEDURE — G0156 HHCP-SVS OF AIDE,EA 15 MIN: HCPCS

## 2022-08-05 PROCEDURE — 0651 HSPC ROUTINE HOME CARE

## 2022-08-06 PROCEDURE — 0651 HSPC ROUTINE HOME CARE

## 2022-08-07 PROCEDURE — 0651 HSPC ROUTINE HOME CARE

## 2022-08-08 ENCOUNTER — HOME CARE VISIT (OUTPATIENT)
Dept: HOSPICE | Facility: HOSPICE | Age: 87
End: 2022-08-08
Payer: MEDICARE

## 2022-08-08 PROCEDURE — 0651 HSPC ROUTINE HOME CARE

## 2022-08-09 ENCOUNTER — HOME CARE VISIT (OUTPATIENT)
Dept: SCHEDULING | Facility: HOME HEALTH | Age: 87
End: 2022-08-09
Payer: MEDICARE

## 2022-08-09 VITALS
SYSTOLIC BLOOD PRESSURE: 103 MMHG | DIASTOLIC BLOOD PRESSURE: 60 MMHG | TEMPERATURE: 97.6 F | RESPIRATION RATE: 16 BRPM | HEART RATE: 73 BPM | OXYGEN SATURATION: 96 %

## 2022-08-09 PROCEDURE — G0299 HHS/HOSPICE OF RN EA 15 MIN: HCPCS

## 2022-08-09 PROCEDURE — 0651 HSPC ROUTINE HOME CARE

## 2022-08-09 ASSESSMENT — ENCOUNTER SYMPTOMS
TROUBLE SWALLOWING: 1
BOWEL INCONTINENCE: 1
STOOL DESCRIPTION: SOFT FORMED
SNORING: 1

## 2022-08-09 NOTE — HOSPICE
Skilled Nursing Comprehensive visit completed: Hellen March lives with spouse in their home. Daughter El Roa and [employer] care for Hellen March. Pt. has been bedbound and is totally dependent for all ADLS. Pt. sleeps 20 + hrs. a day. Pt. is totally dependent for all ADL's. At admission her PPS was 40 % and now 30 %. Pt. was very relaxed today she did not tense up when I tried to assess her. Upper extremities are contracted and lower are stiff. Pt. has paid CG's that turn and reposition her q 2 hrs. Her skin is very fragile but intact. Purple area on sacrum is gone this week. Zinc paste used by PCG's Incontinent of bowels and bladder wears briefs. RMUAC 21.0 cm. Other signs of weight loss are her sunken cheek bones, bitemporal wasting, prominent clavicles and bony prominences. Hellen March is eating 75 % of her normal amt. Her foods must be very soft or pureed. Pt. must be fed it can take 2 hrs. for her to eat. Fluid intake has always been good, CG's use 10 cc syringe. Pt. is primarily non- verbal. Hellen March has liquid meds for prn use. Pt. has no regular daily meds. El Roa will administer if necessary but does not like to use. Review with CG's q visit for safety measures and precautions. I reviewed importance of keeping her clean and dry and turning her at least once during the night. CG knows how to reach [facility] for any needs or concerns.

## 2022-08-10 ENCOUNTER — HOME CARE VISIT (OUTPATIENT)
Dept: SCHEDULING | Facility: HOME HEALTH | Age: 87
End: 2022-08-10
Payer: MEDICARE

## 2022-08-10 PROCEDURE — 0651 HSPC ROUTINE HOME CARE

## 2022-08-10 PROCEDURE — G0156 HHCP-SVS OF AIDE,EA 15 MIN: HCPCS

## 2022-08-11 PROCEDURE — 0651 HSPC ROUTINE HOME CARE

## 2022-08-12 ENCOUNTER — HOME CARE VISIT (OUTPATIENT)
Dept: SCHEDULING | Facility: HOME HEALTH | Age: 87
End: 2022-08-12
Payer: MEDICARE

## 2022-08-12 PROCEDURE — 0651 HSPC ROUTINE HOME CARE

## 2022-08-12 PROCEDURE — G0156 HHCP-SVS OF AIDE,EA 15 MIN: HCPCS

## 2022-08-13 PROCEDURE — 0651 HSPC ROUTINE HOME CARE

## 2022-08-14 PROCEDURE — 0651 HSPC ROUTINE HOME CARE

## 2022-08-15 ENCOUNTER — HOME CARE VISIT (OUTPATIENT)
Dept: SCHEDULING | Facility: HOME HEALTH | Age: 87
End: 2022-08-15
Payer: MEDICARE

## 2022-08-15 PROCEDURE — G0156 HHCP-SVS OF AIDE,EA 15 MIN: HCPCS

## 2022-08-15 PROCEDURE — 0651 HSPC ROUTINE HOME CARE

## 2022-08-16 PROCEDURE — 0651 HSPC ROUTINE HOME CARE

## 2022-08-17 ENCOUNTER — HOME CARE VISIT (OUTPATIENT)
Dept: SCHEDULING | Facility: HOME HEALTH | Age: 87
End: 2022-08-17
Payer: MEDICARE

## 2022-08-17 VITALS
OXYGEN SATURATION: 92 % | RESPIRATION RATE: 18 BRPM | SYSTOLIC BLOOD PRESSURE: 128 MMHG | TEMPERATURE: 96.8 F | HEART RATE: 54 BPM | DIASTOLIC BLOOD PRESSURE: 77 MMHG

## 2022-08-17 PROCEDURE — G0156 HHCP-SVS OF AIDE,EA 15 MIN: HCPCS

## 2022-08-17 PROCEDURE — 0651 HSPC ROUTINE HOME CARE

## 2022-08-17 PROCEDURE — G0299 HHS/HOSPICE OF RN EA 15 MIN: HCPCS

## 2022-08-17 ASSESSMENT — ENCOUNTER SYMPTOMS
TROUBLE SWALLOWING: 1
BOWEL INCONTINENCE: 1
STOOL DESCRIPTION: SOFT

## 2022-08-17 NOTE — HOSPICE
Skilled Nursing Comprehensive visit completed: Corrinne Client lives with spouse in their home. Daughter Dino Casanova and [employer] care for Corrinne Client. Vts. assessed WNL's See ROS. Pt. is bedbound and is totally dependent for all ADLS. Pt. sleeps 20 + hrs. a day. Pt. is totally dependent for all ADL's. PPS 30 %. Pt. was very relaxed today she did not tense up when I tried to assess her. Upper extremities are contracted and lower are stiff. Pt. has paid CG's that turn and reposition her q 2 hrs. Her skin is very fragile but intact. Zinc paste used by PCG's Incontinent of bowels and bladder wears briefs. RMUAC 21.0 cm. Other signs of weight loss are her sunken cheek bones, bitemporal wasting, prominent clavicles and bony prominences. Corrinne Client is eating 50 %-75% of her normal amt. most days most days. Her foods must be very soft or pureed. Pt. must be fed it can take 2 hrs. for her to eat. Fluid intake has always been good, CG's use 10 cc syringe. Pt. is primarily non- verbal. Corrinne Client has liquid meds for prn use. Pt. has no regular daily meds. Dino Casanova will administer if necessary but does not like to use. Review with CG's q visit for safety measures and precautions. I reviewed importance of keeping her clean and dry and turning her at least once during the night. CG knows how to reach [facility] for any needs or concerns.

## 2022-08-18 PROCEDURE — 0651 HSPC ROUTINE HOME CARE

## 2022-08-19 ENCOUNTER — HOME CARE VISIT (OUTPATIENT)
Dept: SCHEDULING | Facility: HOME HEALTH | Age: 87
End: 2022-08-19
Payer: MEDICARE

## 2022-08-19 PROCEDURE — 0651 HSPC ROUTINE HOME CARE

## 2022-08-19 PROCEDURE — G0156 HHCP-SVS OF AIDE,EA 15 MIN: HCPCS

## 2022-08-20 PROCEDURE — 0651 HSPC ROUTINE HOME CARE

## 2022-08-21 PROCEDURE — 0651 HSPC ROUTINE HOME CARE

## 2022-08-22 ENCOUNTER — HOME CARE VISIT (OUTPATIENT)
Dept: SCHEDULING | Facility: HOME HEALTH | Age: 87
End: 2022-08-22
Payer: MEDICARE

## 2022-08-22 PROCEDURE — 0651 HSPC ROUTINE HOME CARE

## 2022-08-22 PROCEDURE — G0156 HHCP-SVS OF AIDE,EA 15 MIN: HCPCS

## 2022-08-23 PROCEDURE — 0651 HSPC ROUTINE HOME CARE

## 2022-08-24 ENCOUNTER — HOME CARE VISIT (OUTPATIENT)
Dept: SCHEDULING | Facility: HOME HEALTH | Age: 87
End: 2022-08-24
Payer: MEDICARE

## 2022-08-24 VITALS
SYSTOLIC BLOOD PRESSURE: 116 MMHG | RESPIRATION RATE: 18 BRPM | DIASTOLIC BLOOD PRESSURE: 75 MMHG | HEART RATE: 60 BPM | OXYGEN SATURATION: 98 % | TEMPERATURE: 97.9 F

## 2022-08-24 PROCEDURE — G0299 HHS/HOSPICE OF RN EA 15 MIN: HCPCS

## 2022-08-24 PROCEDURE — G0156 HHCP-SVS OF AIDE,EA 15 MIN: HCPCS

## 2022-08-24 PROCEDURE — 0651 HSPC ROUTINE HOME CARE

## 2022-08-24 ASSESSMENT — ENCOUNTER SYMPTOMS
RESPIRATORY PAIN: 1
BOWEL INCONTINENCE: 1
STOOL DESCRIPTION: SOFT FORMED

## 2022-08-24 NOTE — HOSPICE
Skilled Nursing Comprehensive visit completed: Raghav Bernard lives with spouse in their home. Daughter Wilson Veliz and [employer] care for Raghav Bernard. Vts. assessed WNL's See ROS. Pt. is bedbound and is totally dependent for all ADLS. Pt. sleeps 20 + hrs. a day. Pt. is totally dependent for all ADL's. PPS 30 %. Pt. was very relaxed today she did not tense up when I tried to assess her. Upper extremities are contracted and lower are stiff. Pt. has paid CG's that turn and reposition her q 2 hrs. Her skin is very fragile but intact. Zinc paste used by PCG's Incontinent of bowels and bladder wears briefs. RMUAC 21.0 cm. Other signs of weight loss are her sunken cheek bones, bitemporal wasting, prominent clavicles and bony prominences. Raghav Bernard is eating 90%  of her normal amt. most days most days. Her foods must be very soft or pureed. Pt. must be fed it can take 2 hrs. for her to eat. Fluid intake has always been good, CG's use 10 cc syringe. Pt. is primarily non- verbal. Raghav Bernard has liquid meds for prn use. Pt. has no regular daily meds. Wilson Veliz will administer if necessary but does not like to use. Review with CG's q visit for safety measures and precautions. I reviewed importance of keeping her clean and dry and turning her at least once during the night. CG knows how to reach [facility] for any needs or concerns.

## 2022-08-25 ENCOUNTER — HOME CARE VISIT (OUTPATIENT)
Dept: SCHEDULING | Facility: HOME HEALTH | Age: 87
End: 2022-08-25
Payer: MEDICARE

## 2022-08-25 PROCEDURE — 0651 HSPC ROUTINE HOME CARE

## 2022-08-26 ENCOUNTER — HOME CARE VISIT (OUTPATIENT)
Dept: SCHEDULING | Facility: HOME HEALTH | Age: 87
End: 2022-08-26
Payer: MEDICARE

## 2022-08-26 PROCEDURE — 0651 HSPC ROUTINE HOME CARE

## 2022-08-26 PROCEDURE — G0156 HHCP-SVS OF AIDE,EA 15 MIN: HCPCS

## 2022-08-27 PROCEDURE — 0651 HSPC ROUTINE HOME CARE

## 2022-08-28 PROCEDURE — 0651 HSPC ROUTINE HOME CARE

## 2022-08-29 ENCOUNTER — HOME CARE VISIT (OUTPATIENT)
Dept: SCHEDULING | Facility: HOME HEALTH | Age: 87
End: 2022-08-29
Payer: MEDICARE

## 2022-08-29 PROCEDURE — G0156 HHCP-SVS OF AIDE,EA 15 MIN: HCPCS

## 2022-08-29 PROCEDURE — 0651 HSPC ROUTINE HOME CARE

## 2022-08-30 PROCEDURE — 0651 HSPC ROUTINE HOME CARE

## 2022-08-31 ENCOUNTER — HOME CARE VISIT (OUTPATIENT)
Dept: SCHEDULING | Facility: HOME HEALTH | Age: 87
End: 2022-08-31
Payer: MEDICARE

## 2022-08-31 VITALS
DIASTOLIC BLOOD PRESSURE: 86 MMHG | SYSTOLIC BLOOD PRESSURE: 137 MMHG | TEMPERATURE: 97.9 F | OXYGEN SATURATION: 97 % | HEART RATE: 70 BPM | RESPIRATION RATE: 20 BRPM

## 2022-08-31 PROCEDURE — 0651 HSPC ROUTINE HOME CARE

## 2022-08-31 PROCEDURE — G0299 HHS/HOSPICE OF RN EA 15 MIN: HCPCS

## 2022-08-31 PROCEDURE — G0156 HHCP-SVS OF AIDE,EA 15 MIN: HCPCS

## 2022-08-31 ASSESSMENT — ENCOUNTER SYMPTOMS
BOWEL INCONTINENCE: 1
RESPIRATORY PAIN: 1
STOOL DESCRIPTION: SOFT FORMED
TROUBLE SWALLOWING: 1

## 2022-08-31 NOTE — HOSPICE
Skilled Nursing Comprehensive visit completed: Hellen March lives with spouse in their home. Daughter El Roa and [employer] care for Hellen March. Pt. was getting a bath whe I arrived. Vts. assessed WNL's See ROS. Pt. is bedbound and is totally dependent for all ADLS. Pt. sleeps 20+ hrs. a day. Pt. is totally dependent for all ADL's. PPS 30 %. Upper extremities are contracted and lower are stiff. Pt. has paid CG's that turn and reposition her q 2 hrs. Her skin is very fragile but intact. Zinc paste used by PCG's Incontinent of bowels and bladder wears briefs. RMUAC 21.0 cm. Other signs of weight loss are her sunken cheek bones, bitemporal wasting, prominent clavicles and bony prominences. Hellen March is eating 90% of her normal amt. most days most days. Her foods must be very soft or pureed. Pt. must be fed it can take 2 hrs. for her to eat. Fluid intake has always been good, CG's use 10 cc syringe. Pt. is primarily non- verbal. Hellen March has liquid meds for prn use. Which need replacing as they will soon , I am ordering today. Pt. has no regular daily meds. El Roa will administer if necessary but does not like to use. Review with CG's q visit for safety measures and precautions. I reviewed importance of keeping her clean and dry and turning her at least once during the night. CG knows how to reach [facility] for any needs or concerns.

## 2022-09-01 PROCEDURE — 2500000001 HSPC NON INJECTABLE MED

## 2022-09-01 PROCEDURE — 0651 HSPC ROUTINE HOME CARE

## 2022-09-02 ENCOUNTER — HOME CARE VISIT (OUTPATIENT)
Dept: SCHEDULING | Facility: HOME HEALTH | Age: 87
End: 2022-09-02
Payer: MEDICARE

## 2022-09-02 PROCEDURE — 0651 HSPC ROUTINE HOME CARE

## 2022-09-02 PROCEDURE — G0156 HHCP-SVS OF AIDE,EA 15 MIN: HCPCS

## 2022-09-03 PROCEDURE — 0651 HSPC ROUTINE HOME CARE

## 2022-09-04 PROCEDURE — 0651 HSPC ROUTINE HOME CARE

## 2022-09-05 ENCOUNTER — HOME CARE VISIT (OUTPATIENT)
Dept: SCHEDULING | Facility: HOME HEALTH | Age: 87
End: 2022-09-05
Payer: MEDICARE

## 2022-09-05 PROCEDURE — 0651 HSPC ROUTINE HOME CARE

## 2022-09-06 PROCEDURE — 0651 HSPC ROUTINE HOME CARE

## 2022-09-07 ENCOUNTER — HOME CARE VISIT (OUTPATIENT)
Dept: SCHEDULING | Facility: HOME HEALTH | Age: 87
End: 2022-09-07
Payer: MEDICARE

## 2022-09-07 VITALS
HEART RATE: 75 BPM | RESPIRATION RATE: 18 BRPM | TEMPERATURE: 98 F | OXYGEN SATURATION: 99 % | SYSTOLIC BLOOD PRESSURE: 120 MMHG | DIASTOLIC BLOOD PRESSURE: 77 MMHG

## 2022-09-07 PROCEDURE — 0651 HSPC ROUTINE HOME CARE

## 2022-09-07 PROCEDURE — G0156 HHCP-SVS OF AIDE,EA 15 MIN: HCPCS

## 2022-09-07 PROCEDURE — G0299 HHS/HOSPICE OF RN EA 15 MIN: HCPCS

## 2022-09-07 ASSESSMENT — ENCOUNTER SYMPTOMS
STOOL DESCRIPTION: SOFT FORMED
TROUBLE SWALLOWING: 1
SNORING: 1

## 2022-09-07 NOTE — HOSPICE
Skilled Nursing Comprehensive visit completed: Urszula Espinosa lives with spouse in their home. Daughter Estela Morrison and [employer] care for Urszula Espinosa. Pt. was awake when I arrived. for visit. Vts. assessed WNL's See ROS. Pt. is bedbound and is totally dependent for all ADLS. Pt. sleeps 20+ hrs. a day. Pt. is totally dependent for all ADL's. PPS 30 %. Upper extremities are contracted and lower are stiff. Pt. has paid CG's that turn and reposition her q 2 hrs. Her skin is very fragile but intact. Zinc paste used by PCG's Incontinent of bowels and bladder wears briefs. RMUAC 21.0 cm. Other signs of weight loss are her sunken cheek bones, bitemporal wasting, prominent clavicles and bony prominences. Urszula Espinosa is eating 90% of her normal amt. most days most days. Her foods must be very soft or pureed. Pt. must be fed it can take 2 hrs. for her to eat. Fluid intake has always been good, CG's use 10 cc syringe. Pt. is primarily non- verbal. Urszula Espinosa has liquid meds for prn use. Pt. has no regular daily meds. Estela Morrison will administer if necessary but does not like to use. Review with CG's q visit for safety measures and precautions. I reviewed importance of keeping her clean and dry and turning her at least once during the night. CG knows how to reach [facility] for any needs or concerns.

## 2022-09-08 PROCEDURE — 0651 HSPC ROUTINE HOME CARE

## 2022-09-09 ENCOUNTER — HOME CARE VISIT (OUTPATIENT)
Dept: SCHEDULING | Facility: HOME HEALTH | Age: 87
End: 2022-09-09
Payer: MEDICARE

## 2022-09-09 PROCEDURE — G0156 HHCP-SVS OF AIDE,EA 15 MIN: HCPCS

## 2022-09-09 PROCEDURE — 0651 HSPC ROUTINE HOME CARE

## 2022-09-10 PROCEDURE — 0651 HSPC ROUTINE HOME CARE

## 2022-09-11 PROCEDURE — 0651 HSPC ROUTINE HOME CARE

## 2022-09-12 ENCOUNTER — HOME CARE VISIT (OUTPATIENT)
Dept: SCHEDULING | Facility: HOME HEALTH | Age: 87
End: 2022-09-12
Payer: MEDICARE

## 2022-09-12 PROCEDURE — G0156 HHCP-SVS OF AIDE,EA 15 MIN: HCPCS

## 2022-09-12 PROCEDURE — 0651 HSPC ROUTINE HOME CARE

## 2022-09-13 PROCEDURE — 0651 HSPC ROUTINE HOME CARE

## 2022-09-14 ENCOUNTER — HOME CARE VISIT (OUTPATIENT)
Dept: SCHEDULING | Facility: HOME HEALTH | Age: 87
End: 2022-09-14
Payer: MEDICARE

## 2022-09-14 VITALS
HEART RATE: 60 BPM | SYSTOLIC BLOOD PRESSURE: 130 MMHG | DIASTOLIC BLOOD PRESSURE: 80 MMHG | TEMPERATURE: 96.1 F | RESPIRATION RATE: 16 BRPM

## 2022-09-14 PROCEDURE — G0299 HHS/HOSPICE OF RN EA 15 MIN: HCPCS

## 2022-09-14 PROCEDURE — 0651 HSPC ROUTINE HOME CARE

## 2022-09-14 PROCEDURE — G0156 HHCP-SVS OF AIDE,EA 15 MIN: HCPCS

## 2022-09-14 ASSESSMENT — ENCOUNTER SYMPTOMS
STOOL DESCRIPTION: FORMED
SNORING: 1
BOWEL INCONTINENCE: 1

## 2022-09-14 NOTE — HOSPICE
Skilled Nursing Comprehensive visit completed: Kingsley Guaman lives with spouse in their home. Daughter Monisha Qureshi and [employer] care for Kingsley Guaman. Pt. was sleeping when I arrived. for visit. Vts. Assessed WNL's See ROS. Pt. is bedbound and is totally dependent for all ADLS. Pt. sleeps 20+ hrs. a day. Pt. is totally dependent for all ADL's. PPS 30 %. Upper extremities are contracted and lower are stiff. Pt. has paid CG's that turn and reposition her q 2 hrs. Pt's hips and heels are floated. Her skin is very fragile but intact. Zinc paste used by PCG's Incontinent of bowels and bladder wears briefs. LMUAC 23.0 cm. Other signs of weight loss are her sunken cheek bones, bitemporal wasting, prominent clavicles and bony prominences. Kingsley Guaman is eating 85- 90% of her normal amt. most days. Her foods must be very soft or pureed. Pt. must be fed it can take 2 hrs. for her to eat. Fluid intake has always been good, CG's use 10 cc syringe. Pt. is primarily non- verbal. Kingsley Guaman has liquid meds for prn use. Pt. has no regular daily meds. Monisha Qureshi will administer if necessary but does not like to use. Review with CG's q visit for safety measures and precautions. Bed rails up x 2 at all times. I reviewed importance of keeping her clean and dry and turning her at least once during the night. CG knows how to reach [facility] for any needs or concerns.

## 2022-09-15 PROCEDURE — 0651 HSPC ROUTINE HOME CARE

## 2022-09-16 ENCOUNTER — HOME CARE VISIT (OUTPATIENT)
Dept: HOSPICE | Facility: HOSPICE | Age: 87
End: 2022-09-16
Payer: MEDICARE

## 2022-09-16 ENCOUNTER — HOME CARE VISIT (OUTPATIENT)
Dept: SCHEDULING | Facility: HOME HEALTH | Age: 87
End: 2022-09-16
Payer: MEDICARE

## 2022-09-16 PROCEDURE — G0156 HHCP-SVS OF AIDE,EA 15 MIN: HCPCS

## 2022-09-16 PROCEDURE — 0651 HSPC ROUTINE HOME CARE

## 2022-09-16 PROCEDURE — G0155 HHCP-SVS OF CSW,EA 15 MIN: HCPCS

## 2022-09-17 PROCEDURE — 0651 HSPC ROUTINE HOME CARE

## 2022-09-18 PROCEDURE — 0651 HSPC ROUTINE HOME CARE

## 2022-09-19 ENCOUNTER — HOME CARE VISIT (OUTPATIENT)
Dept: SCHEDULING | Facility: HOME HEALTH | Age: 87
End: 2022-09-19
Payer: MEDICARE

## 2022-09-19 PROCEDURE — 0651 HSPC ROUTINE HOME CARE

## 2022-09-19 PROCEDURE — G0156 HHCP-SVS OF AIDE,EA 15 MIN: HCPCS

## 2022-09-20 PROCEDURE — 0651 HSPC ROUTINE HOME CARE

## 2022-09-21 ENCOUNTER — HOME CARE VISIT (OUTPATIENT)
Dept: SCHEDULING | Facility: HOME HEALTH | Age: 87
End: 2022-09-21
Payer: MEDICARE

## 2022-09-21 PROCEDURE — 0651 HSPC ROUTINE HOME CARE

## 2022-09-21 PROCEDURE — G0156 HHCP-SVS OF AIDE,EA 15 MIN: HCPCS

## 2022-09-21 ASSESSMENT — ENCOUNTER SYMPTOMS: HEMOPTYSIS: 0

## 2022-09-21 NOTE — HOSPICE
Wolfgang Hernandes is alert but not interactive. She continues to receive optimal care from her family and hired caregivers. Hilary Das denies any new needs and reports coping well.  SW provided active listening and provided emotional support

## 2022-09-22 PROCEDURE — 0651 HSPC ROUTINE HOME CARE

## 2022-09-23 ENCOUNTER — HOME CARE VISIT (OUTPATIENT)
Dept: SCHEDULING | Facility: HOME HEALTH | Age: 87
End: 2022-09-23
Payer: MEDICARE

## 2022-09-23 VITALS
TEMPERATURE: 97.1 F | DIASTOLIC BLOOD PRESSURE: 73 MMHG | OXYGEN SATURATION: 95 % | SYSTOLIC BLOOD PRESSURE: 150 MMHG | HEART RATE: 68 BPM | RESPIRATION RATE: 16 BRPM

## 2022-09-23 PROCEDURE — G0156 HHCP-SVS OF AIDE,EA 15 MIN: HCPCS

## 2022-09-23 PROCEDURE — 0651 HSPC ROUTINE HOME CARE

## 2022-09-23 PROCEDURE — G0299 HHS/HOSPICE OF RN EA 15 MIN: HCPCS

## 2022-09-23 ASSESSMENT — ENCOUNTER SYMPTOMS
STOOL DESCRIPTION: SOFT
HEMOPTYSIS: 0

## 2022-09-23 NOTE — HOSPICE
Skilled Nursing Comprehensive visit completed: Raghav Bernard lives with spouse in their home. Daughter Wilson Veliz and [employer] care for Raghav Bernard. Tru Gold is a 80year-old patient with a Hospice diagnosis of Alzheimer's dementia. Raghav Bernard is frail well cared for woman who is virtually unresponsive to  verbal interaction and does not verbalize her needs in any way. She lays in a hospital bed with her arms crossed and they are difficult to move even get a blood pressure. She has frequent jerky motions and picks at her closing on her chest. Whenever she is touched or maneuvered in any way she appears quite distressed with a furrowed brow but does respond well to comforting talk and deliberate slow movements. She is fed by her attendants who are paid by the family or the daughter who lives in the home. Its fixable amount of time to get her fed but she does eat a tail size portion three times a day and drinks adequate fluids via 10CC syringe. She is completely incontinent of urine and stool, but her skin is well maintained by the caregivers. She is floated on pillows and is turned every two hours. There are no signs of pressure areas or redness on the skin currently. There are no new problems this week. No medications needed. Zinc paste ordered.

## 2022-09-24 PROCEDURE — 0651 HSPC ROUTINE HOME CARE

## 2022-09-25 PROCEDURE — 0651 HSPC ROUTINE HOME CARE

## 2022-09-26 ENCOUNTER — HOME CARE VISIT (OUTPATIENT)
Dept: SCHEDULING | Facility: HOME HEALTH | Age: 87
End: 2022-09-26
Payer: MEDICARE

## 2022-09-26 PROCEDURE — G0156 HHCP-SVS OF AIDE,EA 15 MIN: HCPCS

## 2022-09-26 PROCEDURE — 0651 HSPC ROUTINE HOME CARE

## 2022-09-27 PROCEDURE — 0651 HSPC ROUTINE HOME CARE

## 2022-09-28 ENCOUNTER — HOME CARE VISIT (OUTPATIENT)
Dept: SCHEDULING | Facility: HOME HEALTH | Age: 87
End: 2022-09-28
Payer: MEDICARE

## 2022-09-28 VITALS
TEMPERATURE: 97.9 F | SYSTOLIC BLOOD PRESSURE: 115 MMHG | RESPIRATION RATE: 21 BRPM | DIASTOLIC BLOOD PRESSURE: 71 MMHG | HEART RATE: 63 BPM

## 2022-09-28 PROCEDURE — G0156 HHCP-SVS OF AIDE,EA 15 MIN: HCPCS

## 2022-09-28 PROCEDURE — 0651 HSPC ROUTINE HOME CARE

## 2022-09-28 PROCEDURE — G0299 HHS/HOSPICE OF RN EA 15 MIN: HCPCS

## 2022-09-28 ASSESSMENT — ENCOUNTER SYMPTOMS
TROUBLE SWALLOWING: 1
BOWEL INCONTINENCE: 1
STOOL DESCRIPTION: SOFT FORMED

## 2022-09-28 NOTE — HOSPICE
Skilled Nursing Comprehensive visit completed: Urszula Espinosa lives with spouse in their home. Daughter Estela Morrison and [employer] care for Urszula Espinosa. Pt. was sleeping when I arrived. for visit. Vts. Assessed WNL's See ROS. Pt. is bedbound and is totally dependent for all ADLS. Pt. sleeps 20+ hrs. a day. Pt. is totally dependent for all ADL's. PPS 30 %. Upper extremities are contracted and lower are stiff. Pt. has paid CG's that turn and reposition her q 2 hrs. Pt's hips and heels are floated. Her skin is very fragile but intact. Zinc paste used by PCG's Incontinent of bowels and bladder wears briefs. LMUAC 21.0 cm. Other signs of weight loss are her sunken cheek bones, bitemporal wasting, prominent clavicles and bony prominences. Urszula Espinosa is eating 85- 90% of her normal amt. most days. Her foods must be very soft or pureed. Pt. must be fed it can take 2 hrs. for her to eat. Fluid intake has always been good, CG's use 10 cc syringe. Pt. is primarily non- verbal. Urszula Espinosa has liquid meds for prn use. Pt. has no regular daily meds. Estela Morrison will administer if necessary but does not like to use. Review with CG's q visit for safety measures and precautions. Bed rails up x 2 at all times. I reviewed importance of keeping her clean and dry and turning her at least once during the night. CG knows how to reach [facility] for any needs or concerns.

## 2022-09-29 PROCEDURE — 0651 HSPC ROUTINE HOME CARE

## 2022-09-30 ENCOUNTER — HOME CARE VISIT (OUTPATIENT)
Dept: SCHEDULING | Facility: HOME HEALTH | Age: 87
End: 2022-09-30
Payer: MEDICARE

## 2022-09-30 PROCEDURE — G0156 HHCP-SVS OF AIDE,EA 15 MIN: HCPCS

## 2022-09-30 PROCEDURE — 0651 HSPC ROUTINE HOME CARE

## 2022-10-01 PROCEDURE — 0651 HSPC ROUTINE HOME CARE

## 2022-10-02 PROCEDURE — 0651 HSPC ROUTINE HOME CARE

## 2022-10-03 ENCOUNTER — HOME CARE VISIT (OUTPATIENT)
Dept: SCHEDULING | Facility: HOME HEALTH | Age: 87
End: 2022-10-03
Payer: MEDICARE

## 2022-10-03 PROCEDURE — 0651 HSPC ROUTINE HOME CARE

## 2022-10-03 PROCEDURE — G0156 HHCP-SVS OF AIDE,EA 15 MIN: HCPCS

## 2022-10-04 PROCEDURE — 0651 HSPC ROUTINE HOME CARE

## 2022-10-05 ENCOUNTER — HOME CARE VISIT (OUTPATIENT)
Dept: SCHEDULING | Facility: HOME HEALTH | Age: 87
End: 2022-10-05
Payer: MEDICARE

## 2022-10-05 ENCOUNTER — HOME CARE VISIT (OUTPATIENT)
Dept: HOSPICE | Facility: HOSPICE | Age: 87
End: 2022-10-05
Payer: MEDICARE

## 2022-10-05 VITALS
DIASTOLIC BLOOD PRESSURE: 91 MMHG | RESPIRATION RATE: 16 BRPM | SYSTOLIC BLOOD PRESSURE: 138 MMHG | TEMPERATURE: 97.9 F | HEART RATE: 76 BPM

## 2022-10-05 PROCEDURE — G0299 HHS/HOSPICE OF RN EA 15 MIN: HCPCS

## 2022-10-05 PROCEDURE — G0156 HHCP-SVS OF AIDE,EA 15 MIN: HCPCS

## 2022-10-05 PROCEDURE — G0155 HHCP-SVS OF CSW,EA 15 MIN: HCPCS

## 2022-10-05 PROCEDURE — 0651 HSPC ROUTINE HOME CARE

## 2022-10-05 ASSESSMENT — ENCOUNTER SYMPTOMS
BOWEL INCONTINENCE: 1
DYSPNEA ACTIVITY LEVEL: AT REST
STOOL DESCRIPTION: SOFT FORMED

## 2022-10-05 NOTE — HOSPICE
Skilled Nursing Comprehensive visit completed: Flor Manning lives with spouse in their home. Daughter Gayle Castro and [employer] care for Flor Manning. Pt. was eating her dinner when I arrived for . Vts. Assessed WNL's See ROS. Pt. is bedbound and is totally dependent for all ADLS. Pt. sleeps 20+ hrs. a day. Pt. is totally dependent for all ADL's. PPS 30 %. Upper extremities are contracted and lower are stiff. Pt. has paid CG's that turn and reposition her q 2 hrs. Pt's hips and heels are floated. Her skin is very fragile but intact. Zinc paste used by PCG's Incontinent of bowels and bladder wears briefs. LMUAC 21.0 cm. Other signs of weight loss are her sunken cheek bones, bitemporal wasting, prominent clavicles and bony prominences. Flor Manning is eating 85- 90% of her normal amt. most days. Her foods must be very soft or pureed. Pt. must be fed it can take 2 hrs. for her to eat. Fluid intake has always been good, CG's use 10 cc syringe. Pt. is primarily non- verbal. Flor Manning has liquid meds for prn use. Pt. has no regular daily meds. Gayle Castro will administer if necessary but does not like to use. NO supplies needed. No DME ordered. Review with CG's q visit for safety measures and precautions. Bed rails up x 2 at all times. I reviewed importance of keeping her clean and dry and turning her at least once during the night. CG knows how to reach [facility] for any needs or concerns.

## 2022-10-06 PROCEDURE — 0651 HSPC ROUTINE HOME CARE

## 2022-10-07 ENCOUNTER — HOME CARE VISIT (OUTPATIENT)
Dept: SCHEDULING | Facility: HOME HEALTH | Age: 87
End: 2022-10-07
Payer: MEDICARE

## 2022-10-07 PROCEDURE — G0156 HHCP-SVS OF AIDE,EA 15 MIN: HCPCS

## 2022-10-07 PROCEDURE — 0651 HSPC ROUTINE HOME CARE

## 2022-10-07 ASSESSMENT — ENCOUNTER SYMPTOMS: HEMOPTYSIS: 0

## 2022-10-07 NOTE — HOSPICE
RN and SW visit. Liz Mosqueda is being fed her evening meal by her hired caregiver. Her dtr , Cierra Ceja, and spouse, Angie Ramos are present. Cierra Ceja reports that there has been no change in needs and reports coping well. Angie Ramos has been free from any illness or concerns as well. He is 89. Cierra Ceja did some life review. SW provided praise for care provided and offered emotional support.   Continue to offer availability

## 2022-10-08 PROCEDURE — 0651 HSPC ROUTINE HOME CARE

## 2022-10-09 PROCEDURE — 0651 HSPC ROUTINE HOME CARE

## 2022-10-10 ENCOUNTER — HOME CARE VISIT (OUTPATIENT)
Dept: HOSPICE | Facility: HOSPICE | Age: 87
End: 2022-10-10
Payer: MEDICARE

## 2022-10-10 PROCEDURE — 0651 HSPC ROUTINE HOME CARE

## 2022-10-11 PROCEDURE — 0651 HSPC ROUTINE HOME CARE

## 2022-10-12 ENCOUNTER — HOME CARE VISIT (OUTPATIENT)
Dept: SCHEDULING | Facility: HOME HEALTH | Age: 87
End: 2022-10-12
Payer: MEDICARE

## 2022-10-12 VITALS
SYSTOLIC BLOOD PRESSURE: 124 MMHG | TEMPERATURE: 97.6 F | DIASTOLIC BLOOD PRESSURE: 62 MMHG | HEART RATE: 58 BPM | RESPIRATION RATE: 21 BRPM

## 2022-10-12 PROCEDURE — G0156 HHCP-SVS OF AIDE,EA 15 MIN: HCPCS

## 2022-10-12 PROCEDURE — 0651 HSPC ROUTINE HOME CARE

## 2022-10-12 PROCEDURE — G0299 HHS/HOSPICE OF RN EA 15 MIN: HCPCS

## 2022-10-12 ASSESSMENT — ENCOUNTER SYMPTOMS
STOOL DESCRIPTION: SOFT FORMED
BOWEL INCONTINENCE: 1
DYSPNEA ACTIVITY LEVEL: AT REST
TROUBLE SWALLOWING: 1

## 2022-10-12 NOTE — HOSPICE
Skilled Nursing Comprehensive visit completed: Ventura Chowdary lives with spouse in their home. Daughter Tenisha Alvarez and [employer] care for Ventura Chowdary. Pt. had just finished her bath when I arrived for . Vts. Assessed WNL's See ROS. Pt. is bedbound and is totally dependent for all ADLS. Pt. sleeps 20+ hrs. a day. Pt. is totally dependent for all ADL's. PPS 30 %. Upper extremities are contracted and lower are stiff. Pt. has paid CG's that turn and reposition her q 2 hrs. Pt's hips and heels are floated. Her skin is very fragile but intact. Zinc paste used by PCG's Incontinent of bowels and bladder wears briefs. LMUAC 21.0 cm. Other signs of weight loss are her sunken cheek bones, bitemporal wasting, prominent clavicles and bony prominences. Ventura Chowdary is eating 85- 90% of her normal amt. most days. Her foods must be very soft or pureed. Pt. must be fed it can take 2 hrs. for her to eat. Fluid intake has always been good, CG's use 10 cc syringe. Pt. is primarily non- verbal. Ventura Chowdary has liquid meds for prn use. Pt. has no regular daily meds. Tenisha Alvarez will administer if necessary but does not like to use. No supplies needed. No DME ordered. Review with CG's q visit for safety measures and precautions. Bed rails up x 2 at all times. I reviewed importance of keeping her clean and dry and turning her at least once during the night. CG knows how to reach [facility] for any needs or concerns.

## 2022-10-13 PROCEDURE — 0651 HSPC ROUTINE HOME CARE

## 2022-10-14 ENCOUNTER — HOME CARE VISIT (OUTPATIENT)
Dept: HOSPICE | Facility: HOSPICE | Age: 87
End: 2022-10-14
Payer: MEDICARE

## 2022-10-14 PROCEDURE — 0651 HSPC ROUTINE HOME CARE

## 2022-10-15 PROCEDURE — 0651 HSPC ROUTINE HOME CARE

## 2022-10-16 PROCEDURE — 0651 HSPC ROUTINE HOME CARE

## 2022-10-17 ENCOUNTER — HOME CARE VISIT (OUTPATIENT)
Dept: SCHEDULING | Facility: HOME HEALTH | Age: 87
End: 2022-10-17
Payer: MEDICARE

## 2022-10-17 PROCEDURE — G0156 HHCP-SVS OF AIDE,EA 15 MIN: HCPCS

## 2022-10-17 PROCEDURE — 0651 HSPC ROUTINE HOME CARE

## 2022-10-18 PROCEDURE — 0651 HSPC ROUTINE HOME CARE

## 2022-10-19 ENCOUNTER — HOME CARE VISIT (OUTPATIENT)
Dept: SCHEDULING | Facility: HOME HEALTH | Age: 87
End: 2022-10-19
Payer: MEDICARE

## 2022-10-19 PROCEDURE — 0651 HSPC ROUTINE HOME CARE

## 2022-10-19 PROCEDURE — G0156 HHCP-SVS OF AIDE,EA 15 MIN: HCPCS

## 2022-10-20 ENCOUNTER — HOME CARE VISIT (OUTPATIENT)
Dept: SCHEDULING | Facility: HOME HEALTH | Age: 87
End: 2022-10-20
Payer: MEDICARE

## 2022-10-20 VITALS
DIASTOLIC BLOOD PRESSURE: 79 MMHG | HEART RATE: 51 BPM | TEMPERATURE: 97.8 F | OXYGEN SATURATION: 97 % | RESPIRATION RATE: 18 BRPM | SYSTOLIC BLOOD PRESSURE: 128 MMHG

## 2022-10-20 PROCEDURE — G0299 HHS/HOSPICE OF RN EA 15 MIN: HCPCS

## 2022-10-20 PROCEDURE — 0651 HSPC ROUTINE HOME CARE

## 2022-10-20 ASSESSMENT — ENCOUNTER SYMPTOMS
BOWEL INCONTINENCE: 1
TROUBLE SWALLOWING: 1
STOOL DESCRIPTION: SOFT FORMED
DYSPNEA ACTIVITY LEVEL: AT REST

## 2022-10-21 ENCOUNTER — HOME CARE VISIT (OUTPATIENT)
Dept: SCHEDULING | Facility: HOME HEALTH | Age: 87
End: 2022-10-21
Payer: MEDICARE

## 2022-10-21 PROCEDURE — 0651 HSPC ROUTINE HOME CARE

## 2022-10-21 PROCEDURE — G0156 HHCP-SVS OF AIDE,EA 15 MIN: HCPCS

## 2022-10-22 PROCEDURE — 0651 HSPC ROUTINE HOME CARE

## 2022-10-23 PROCEDURE — 0651 HSPC ROUTINE HOME CARE

## 2022-10-24 PROCEDURE — 0651 HSPC ROUTINE HOME CARE

## 2022-10-25 ENCOUNTER — HOME CARE VISIT (OUTPATIENT)
Dept: SCHEDULING | Facility: HOME HEALTH | Age: 87
End: 2022-10-25
Payer: MEDICARE

## 2022-10-25 PROCEDURE — G0156 HHCP-SVS OF AIDE,EA 15 MIN: HCPCS

## 2022-10-25 PROCEDURE — 0651 HSPC ROUTINE HOME CARE

## 2022-10-26 ENCOUNTER — HOME CARE VISIT (OUTPATIENT)
Dept: SCHEDULING | Facility: HOME HEALTH | Age: 87
End: 2022-10-26
Payer: MEDICARE

## 2022-10-26 VITALS
HEART RATE: 67 BPM | DIASTOLIC BLOOD PRESSURE: 84 MMHG | OXYGEN SATURATION: 92 % | SYSTOLIC BLOOD PRESSURE: 124 MMHG | TEMPERATURE: 97.5 F | RESPIRATION RATE: 22 BRPM

## 2022-10-26 PROCEDURE — G0299 HHS/HOSPICE OF RN EA 15 MIN: HCPCS

## 2022-10-26 PROCEDURE — G0156 HHCP-SVS OF AIDE,EA 15 MIN: HCPCS

## 2022-10-26 PROCEDURE — 0651 HSPC ROUTINE HOME CARE

## 2022-10-26 ASSESSMENT — ENCOUNTER SYMPTOMS
TROUBLE SWALLOWING: 1
BOWEL INCONTINENCE: 1
STOOL DESCRIPTION: SOFT FORMED
DYSPNEA ACTIVITY LEVEL: AT REST

## 2022-10-26 NOTE — HOSPICE
Skilled Nursing Comprehensive visit completed: Mirian Correa lives with spouse in their home. Daughter Shon Perez and [employer] care for Mirian Correa. Pt. had just finished her bath when I arrived for . Vts. Assessed WNL's See ROS. Pt. is bedbound and is totally dependent for all ADLS. Pt. sleeps 20+ hrs. a day. Pt. is totally dependent for all ADL's. PPS 30 %. Upper extremities are contracted and lower are stiff. Pt. has paid CG's that turn and reposition her q 2 hrs. Pt's hips and heels are floated. Her skin is very fragile but intact. Zinc paste used by PCG's Incontinent of bowels and bladder wears briefs. LMUAC 21.0 cm. Other signs of weight loss are her sunken cheek bones, bitemporal wasting, prominent clavicles and bony prominences. Mirian Correa is eating 85- 90% of her normal amt. most days. Her foods must be very soft or pureed. Pt. must be fed it can take 2 hrs. for her to eat. Fluid intake has always been good, CG's use 10 cc syringe. Pt. is primarily non- verbal. Mirian Correa has liquid meds for prn use. Pt. has no regular daily meds. Shon Perez will administer if necessary but does not like to use. No supplies needed. No DME ordered. Review with CG's q visit for safety measures and precautions. Bed rails up x 2 at all times. I reviewed importance of keeping her clean and dry and turning her at least once during the night. I did waste out of date Rox[employer] and Eyvonne Alexandria / Shon Perez witnessed and Med waste form completed. CG knows how to reach [facility] for any needs or concerns.

## 2022-10-27 PROCEDURE — 0651 HSPC ROUTINE HOME CARE

## 2022-10-28 ENCOUNTER — HOME CARE VISIT (OUTPATIENT)
Dept: SCHEDULING | Facility: HOME HEALTH | Age: 87
End: 2022-10-28
Payer: MEDICARE

## 2022-10-28 PROCEDURE — G0156 HHCP-SVS OF AIDE,EA 15 MIN: HCPCS

## 2022-10-28 PROCEDURE — 0651 HSPC ROUTINE HOME CARE

## 2022-10-29 PROCEDURE — 0651 HSPC ROUTINE HOME CARE

## 2022-10-30 PROCEDURE — 0651 HSPC ROUTINE HOME CARE

## 2022-10-31 ENCOUNTER — HOME CARE VISIT (OUTPATIENT)
Dept: SCHEDULING | Facility: HOME HEALTH | Age: 87
End: 2022-10-31
Payer: MEDICARE

## 2022-10-31 PROCEDURE — G0156 HHCP-SVS OF AIDE,EA 15 MIN: HCPCS

## 2022-10-31 PROCEDURE — 0651 HSPC ROUTINE HOME CARE

## 2022-11-01 PROCEDURE — 0651 HSPC ROUTINE HOME CARE

## 2022-11-02 ENCOUNTER — HOME CARE VISIT (OUTPATIENT)
Dept: SCHEDULING | Facility: HOME HEALTH | Age: 87
End: 2022-11-02
Payer: MEDICARE

## 2022-11-02 VITALS
HEART RATE: 75 BPM | DIASTOLIC BLOOD PRESSURE: 74 MMHG | SYSTOLIC BLOOD PRESSURE: 116 MMHG | TEMPERATURE: 97.1 F | RESPIRATION RATE: 18 BRPM

## 2022-11-02 PROCEDURE — 0651 HSPC ROUTINE HOME CARE

## 2022-11-02 PROCEDURE — G0299 HHS/HOSPICE OF RN EA 15 MIN: HCPCS

## 2022-11-02 PROCEDURE — G0156 HHCP-SVS OF AIDE,EA 15 MIN: HCPCS

## 2022-11-02 ASSESSMENT — ENCOUNTER SYMPTOMS
DYSPNEA ACTIVITY LEVEL: AT REST
TROUBLE SWALLOWING: 1
BOWEL INCONTINENCE: 1
STOOL DESCRIPTION: SOFT FORMED

## 2022-11-02 NOTE — HOSPICE
Skilled Nursing Comprehensive visit completed: Homero Bender lives with spouse in their home. Daughter Aretta Shone and [employer] care for Homero Bender. Pt. had just finished being changed an repositioned. Vts. Assessed WNL's See ROS. Pt. is bedbound and is totally dependent for all ADLS. Pt. sleeps 20+ hrs. a day. Pt. is totally dependent for all ADL's. PPS 30 %. Upper extremities are contracted and lower are stiff. Pt. has paid CG's that turn and reposition her q 2 hrs. Pt's hips and heels are floated. Her skin is very fragile but intact. Zinc paste used by PCG's Incontinent of bowels and bladder wears briefs. LMUAC 21.0 cm. Other signs of weight loss are her sunken cheek bones, bitemporal wasting, prominent clavicles and bony prominences. Homero Bender is eating 85- 90% of her normal amt. most days. Her foods must be very soft or pureed. Pt. must be fed it can take 2 hrs. for her to eat. Fluid intake has always been good, CG's use 10 cc syringe. Pt. is primarily non- verbal. Homero Bender has liquid meds for prn use. Pt. has no regular daily meds. Aretta Shone will administer if necessary but does not like to use. No supplies needed. No DME ordered. Review with CG's q visit for safety measures and precautions. Bed rails up x 2 at all times. I reviewed importance of keeping her clean and dry and turning her at least once during the night. CG knows how to reach [facility] for any needs or concerns.

## 2022-11-03 PROCEDURE — 0651 HSPC ROUTINE HOME CARE

## 2022-11-04 ENCOUNTER — HOME CARE VISIT (OUTPATIENT)
Dept: SCHEDULING | Facility: HOME HEALTH | Age: 87
End: 2022-11-04
Payer: MEDICARE

## 2022-11-04 PROCEDURE — 0651 HSPC ROUTINE HOME CARE

## 2022-11-04 PROCEDURE — G0156 HHCP-SVS OF AIDE,EA 15 MIN: HCPCS

## 2022-11-05 PROCEDURE — 0651 HSPC ROUTINE HOME CARE

## 2022-11-06 PROCEDURE — 0651 HSPC ROUTINE HOME CARE

## 2022-11-07 ENCOUNTER — HOME CARE VISIT (OUTPATIENT)
Dept: SCHEDULING | Facility: HOME HEALTH | Age: 87
End: 2022-11-07
Payer: MEDICARE

## 2022-11-07 PROCEDURE — 0651 HSPC ROUTINE HOME CARE

## 2022-11-07 PROCEDURE — G0156 HHCP-SVS OF AIDE,EA 15 MIN: HCPCS

## 2022-11-08 PROCEDURE — 0651 HSPC ROUTINE HOME CARE

## 2022-11-09 ENCOUNTER — HOME CARE VISIT (OUTPATIENT)
Dept: SCHEDULING | Facility: HOME HEALTH | Age: 87
End: 2022-11-09
Payer: MEDICARE

## 2022-11-09 ENCOUNTER — HOME CARE VISIT (OUTPATIENT)
Dept: HOSPICE | Facility: HOSPICE | Age: 87
End: 2022-11-09
Payer: MEDICARE

## 2022-11-09 VITALS
TEMPERATURE: 97.2 F | DIASTOLIC BLOOD PRESSURE: 71 MMHG | SYSTOLIC BLOOD PRESSURE: 114 MMHG | RESPIRATION RATE: 18 BRPM | HEART RATE: 64 BPM

## 2022-11-09 PROCEDURE — 0651 HSPC ROUTINE HOME CARE

## 2022-11-09 PROCEDURE — G0156 HHCP-SVS OF AIDE,EA 15 MIN: HCPCS

## 2022-11-09 PROCEDURE — G0299 HHS/HOSPICE OF RN EA 15 MIN: HCPCS

## 2022-11-09 ASSESSMENT — ENCOUNTER SYMPTOMS
TROUBLE SWALLOWING: 1
STOOL DESCRIPTION: FORMED
BOWEL INCONTINENCE: 1

## 2022-11-09 NOTE — HOSPICE
Skilled Nursing Comprehensive visit completed:  Jun Alvarenga lives with spouse in their home. Daughter Candie Nguyen and [employer] care for Jun Alvarenga. Pt. had just finished getting her bath and was resting when I arrived for my HV. Vts. Assessed WNL's See ROS. Pt. is bedbound and is totally dependent for all ADLS. Pt. sleeps 20+ hrs. a day. Pt. is totally dependent for all ADL's. PPS 30 %. Upper extremities are contracted and lower are stiff. Pt. has paid CG's that turn and reposition her q 2 hrs. Pt's hips and heels are floated. Her skin is very fragile but intact. Zinc paste used by PCG's Incontinent of bowels and bladder wears briefs. LMUAC 21.0 cm. Other signs of weight loss are her sunken cheek bones, bitemporal wasting, prominent clavicles and bony prominences. Jun Alvarenga is eating 85- 90% of her normal amt. most days. Her foods must be very soft or pureed. Pt. must be fed it can take 2 hrs. for her to eat. Fluid intake has always been good, CG's use 10 cc syringe. Pt. is primarily non- verbal. Jun Alvarenga has liquid meds for prn use. Pt. has no regular daily meds. Candie Nguyen will administer if necessary but does not like to use. No supplies needed. No DME ordered. Review with CG's q visit for safety measures and precautions. Bed rails up x 2 at all times. CG knows how to reach [facility] for any needs or concerns.

## 2022-11-11 ENCOUNTER — HOME CARE VISIT (OUTPATIENT)
Dept: SCHEDULING | Facility: HOME HEALTH | Age: 87
End: 2022-11-11
Payer: MEDICARE

## 2022-11-11 ENCOUNTER — HOME CARE VISIT (OUTPATIENT)
Dept: HOSPICE | Facility: HOSPICE | Age: 87
End: 2022-11-11
Payer: MEDICARE

## 2022-11-11 PROCEDURE — G0156 HHCP-SVS OF AIDE,EA 15 MIN: HCPCS

## 2022-11-14 ENCOUNTER — HOME CARE VISIT (OUTPATIENT)
Dept: SCHEDULING | Facility: HOME HEALTH | Age: 87
End: 2022-11-14
Payer: MEDICARE

## 2022-11-14 PROCEDURE — G0156 HHCP-SVS OF AIDE,EA 15 MIN: HCPCS

## 2022-11-16 ENCOUNTER — HOME CARE VISIT (OUTPATIENT)
Dept: SCHEDULING | Facility: HOME HEALTH | Age: 87
End: 2022-11-16
Payer: MEDICARE

## 2022-11-16 VITALS
RESPIRATION RATE: 18 BRPM | SYSTOLIC BLOOD PRESSURE: 125 MMHG | TEMPERATURE: 98.4 F | OXYGEN SATURATION: 99 % | DIASTOLIC BLOOD PRESSURE: 76 MMHG | HEART RATE: 74 BPM

## 2022-11-16 PROCEDURE — G0156 HHCP-SVS OF AIDE,EA 15 MIN: HCPCS

## 2022-11-16 PROCEDURE — G0299 HHS/HOSPICE OF RN EA 15 MIN: HCPCS

## 2022-11-16 ASSESSMENT — ENCOUNTER SYMPTOMS: STOOL DESCRIPTION: SOFT FORMED

## 2022-11-16 NOTE — HOSPICE
Zeny Shavonne 89PS female remains eligible for hospice services based on the following factors: 80 yr. old female admitted to hospice services on 10/7/19 with a Dx: of Alzheimer's disease/ CRF. She is her 18th benefit period which will end on 11/24/22. Elsy Malin lives with spouse in their home. Daughter and PCG's care for Elsy Malin. . The only symptoms she has exhibited are fever and malaise and now productive cough. . Pt. has been bedbound since admission, unless transferred to w/c. Pt. sleeps 20 + hrs. a day. Pt. is totally dependent for all ADL's. At admission her PPS was 40 % and now 30 %. Upper extremities are contracted and lower are stiff. Pt. has paid CG's that turn and reposition her q 2 hrs. Her skin is very fragile but intact. Incontinent of bowels and bladder wears briefs. MUAC on admission 24cm is now 22cm. Other signs of weight loss are her sunken cheek bones, bitemporal wasting, prominent clavicles and bony prominences. Elsy Malin was eating 95% of child sized plate , her foods must be very soft or pureed. Pt. must be fed it can take 2 hrs. for her to eat. Fluid intake has always been good, CG's use 10 cc syringe. Pt. is primarily non- verbal but will occasionally speak one or two words but if you ask her a question, she will respond with a nod. Elsy Malin has liquid meds for prn use. Pt. has no regular daily meds. Fe Mandujano NP gave order for Tylenol 15ml q 6 hrs. prn for mild pain and fever. Mucinex Fast Max liquid 20mls. q 6hrs prn cough. This did not help so they are using Tylenol cold and flu 15 ml BID. Dulcolax suppositories for prn use of constipation. She has Morphine liquid 5 mg. q 4 hrs. prn pain or SOB and Ativan 1 mg. q 2 hrs. prn anxiety in the home. Lokesh Sanchezn will administer but does not like to use. Review with CG's q visit for safety measures and precautions. The relationship between the son and daughter is strained.   Medications reconciled with provider, care plans discussed within [facility] and approved by patient and caregiver. Family in agreement with symptom management and focus on comfort. Education provided to family regarding changes in patient condition, and disease progression.  and MSW involved and providing good support. Plan for next benefit period is medication management, adjusting as needed for changes in swallowing ability. Monitor effectiveness of pain medication, anxiety medication.  SN 1x week HHA 3 x week

## 2022-11-18 ENCOUNTER — HOME CARE VISIT (OUTPATIENT)
Dept: SCHEDULING | Facility: HOME HEALTH | Age: 87
End: 2022-11-18
Payer: MEDICARE

## 2022-11-18 PROCEDURE — G0156 HHCP-SVS OF AIDE,EA 15 MIN: HCPCS

## 2022-11-21 ENCOUNTER — HOME CARE VISIT (OUTPATIENT)
Dept: SCHEDULING | Facility: HOME HEALTH | Age: 87
End: 2022-11-21
Payer: MEDICARE

## 2022-11-21 PROCEDURE — G0156 HHCP-SVS OF AIDE,EA 15 MIN: HCPCS

## 2022-11-23 ENCOUNTER — HOME CARE VISIT (OUTPATIENT)
Dept: SCHEDULING | Facility: HOME HEALTH | Age: 87
End: 2022-11-23
Payer: MEDICARE

## 2022-11-23 VITALS
SYSTOLIC BLOOD PRESSURE: 130 MMHG | DIASTOLIC BLOOD PRESSURE: 81 MMHG | HEART RATE: 60 BPM | TEMPERATURE: 98 F | RESPIRATION RATE: 20 BRPM

## 2022-11-23 PROCEDURE — G0156 HHCP-SVS OF AIDE,EA 15 MIN: HCPCS

## 2022-11-23 PROCEDURE — G0299 HHS/HOSPICE OF RN EA 15 MIN: HCPCS

## 2022-11-23 ASSESSMENT — ENCOUNTER SYMPTOMS
STOOL DESCRIPTION: SOFT FORMED
BOWEL INCONTINENCE: 1

## 2022-11-23 NOTE — HOSPICE
Skilled Nursing Comprehensive visit completed: Alex Moon lives with spouse in their home. Daughter Toni Ramos and [employer] care for Alex Moon. Pt. had just finished getting her bath and was resting when I arrived for my HV. Per sitter pt. had been agitated today during the bath. Vts. Assessed WNL's See ROS. Pt. is bedbound and is totally dependent for all ADLS. Pt. sleeps 20+ hrs. a day. Pt. is totally dependent for all ADL's. PPS 30 %. Upper extremities are contracted and lower are stiff. Pt. has paid CG's that turn and reposition her q 2 hrs. Pt's hips and heels are floated. Her skin is very fragile but intact. Zinc paste used by PCG's Incontinent of bowels and bladder wears briefs. LMUAC 21.0 cm. Other signs of weight loss are her sunken cheek bones, bitemporal wasting, prominent clavicles and bony prominences. Alex Moon is eating 85- 90% of her normal amt. most days. Her foods must be very soft or pureed. Pt. must be fed it can take 2 hrs. for her to eat. Fluid intake has always been good, CG's use 10 cc syringe. Pt. is primarily non- verbal. Alex Moon has liquid meds for prn use. Med rec completed. Pt. has no regular daily meds. Toni Corteser will administer if necessary but does not like to use. No supplies needed. No DME ordered. Review with CG's q visit for safety measures and precautions. Bed rails up x 2 at all times. CG knows how to reach [facility] for any needs or concerns.

## 2022-11-25 ENCOUNTER — HOME CARE VISIT (OUTPATIENT)
Dept: SCHEDULING | Facility: HOME HEALTH | Age: 87
End: 2022-11-25
Payer: MEDICARE

## 2022-11-25 PROCEDURE — G0156 HHCP-SVS OF AIDE,EA 15 MIN: HCPCS

## 2022-11-28 ENCOUNTER — HOME CARE VISIT (OUTPATIENT)
Dept: SCHEDULING | Facility: HOME HEALTH | Age: 87
End: 2022-11-28
Payer: MEDICARE

## 2022-11-28 PROCEDURE — G0156 HHCP-SVS OF AIDE,EA 15 MIN: HCPCS

## 2022-11-29 ENCOUNTER — HOME CARE VISIT (OUTPATIENT)
Dept: SCHEDULING | Facility: HOME HEALTH | Age: 87
End: 2022-11-29
Payer: MEDICARE

## 2022-11-29 ENCOUNTER — HOME CARE VISIT (OUTPATIENT)
Dept: HOSPICE | Facility: HOSPICE | Age: 87
End: 2022-11-29
Payer: MEDICARE

## 2022-11-29 VITALS
OXYGEN SATURATION: 97 % | SYSTOLIC BLOOD PRESSURE: 134 MMHG | DIASTOLIC BLOOD PRESSURE: 83 MMHG | HEART RATE: 64 BPM | TEMPERATURE: 98.5 F | RESPIRATION RATE: 21 BRPM

## 2022-11-29 PROCEDURE — G0155 HHCP-SVS OF CSW,EA 15 MIN: HCPCS

## 2022-11-29 PROCEDURE — G0299 HHS/HOSPICE OF RN EA 15 MIN: HCPCS

## 2022-11-29 ASSESSMENT — ENCOUNTER SYMPTOMS
BOWEL INCONTINENCE: 1
TROUBLE SWALLOWING: 1
STOOL DESCRIPTION: SOFT FORMED

## 2022-11-30 ENCOUNTER — HOME CARE VISIT (OUTPATIENT)
Dept: SCHEDULING | Facility: HOME HEALTH | Age: 87
End: 2022-11-30
Payer: MEDICARE

## 2022-11-30 PROCEDURE — G0156 HHCP-SVS OF AIDE,EA 15 MIN: HCPCS

## 2022-11-30 NOTE — HOSPICE
Skilled Nursing Comprehensive visit completed: Elsy Malin lives with spouse in their home. Daughter Lokesh Martínez and [employer] care for Elsy Malin. Pt. resting on her right side. Vts. Assessed WNL's See ROS. Pt. is bedbound and is totally dependent for all ADLS. Pt. sleeps 20+ hrs. a day. Pt. is totally dependent for all ADL's. PPS 20 %. Upper extremities are contracted and lower are stiff. Pt. has paid CG's that turn and reposition her q 2 hrs. Pt's hips and heels are floated. Her skin is very fragile but intact. Zinc paste used by PCG's Incontinent of bowels and bladder wears briefs. RMUAC 21.0 cm. Other signs of weight loss are her sunken cheek bones, bitemporal wasting, prominent clavicles and bony prominences. Elsy Malin is eating 85- 90% of her normal amt. most days. Her foods must be very soft or pureed. Pt. must be fed it can take 2 hrs. for her to eat. Fluid intake has always been good, CG's use 10 cc syringe. Pt. is primarily non- verbal. Elsy Malin has liquid meds for prn use. Med rec completed. Pt. has no regular daily meds. No RF needed. Lokesh Martínez will administer if necessary but does not like to use. Supplies ordered. No DME ordered. Review with CG's q visit for safety measures and precautions. Bed rails up x 2 at all times. CG knows how to reach [facility] for any needs or concerns.

## 2022-12-02 ENCOUNTER — HOME CARE VISIT (OUTPATIENT)
Dept: SCHEDULING | Facility: HOME HEALTH | Age: 87
End: 2022-12-02
Payer: MEDICARE

## 2022-12-02 PROCEDURE — G0156 HHCP-SVS OF AIDE,EA 15 MIN: HCPCS

## 2022-12-02 ASSESSMENT — ENCOUNTER SYMPTOMS: HEMOPTYSIS: 0

## 2022-12-02 NOTE — HOSPICE
Isabelle snored through the visit. Her caregiver (hired) is at the bedside. Cierra Ceja and her father are watching TV. Mr Merna Qureshi turned 80 but was ill and his celebration was cancelled. He has mostly recovered from 2 infections. Cierra Ceja denies any change in needs or concerns for her mother. She is talkative and expressive. She did some life review SW offered emotional support and praise for care provided.

## 2022-12-05 ENCOUNTER — HOME CARE VISIT (OUTPATIENT)
Dept: SCHEDULING | Facility: HOME HEALTH | Age: 87
End: 2022-12-05
Payer: MEDICARE

## 2022-12-05 PROCEDURE — G0156 HHCP-SVS OF AIDE,EA 15 MIN: HCPCS

## 2022-12-07 ENCOUNTER — HOME CARE VISIT (OUTPATIENT)
Dept: SCHEDULING | Facility: HOME HEALTH | Age: 87
End: 2022-12-07
Payer: MEDICARE

## 2022-12-07 VITALS
SYSTOLIC BLOOD PRESSURE: 133 MMHG | RESPIRATION RATE: 18 BRPM | DIASTOLIC BLOOD PRESSURE: 79 MMHG | HEART RATE: 60 BPM | TEMPERATURE: 98.4 F

## 2022-12-07 PROCEDURE — G0299 HHS/HOSPICE OF RN EA 15 MIN: HCPCS

## 2022-12-07 PROCEDURE — G0156 HHCP-SVS OF AIDE,EA 15 MIN: HCPCS

## 2022-12-07 ASSESSMENT — ENCOUNTER SYMPTOMS
BOWEL INCONTINENCE: 1
STOOL DESCRIPTION: SOFT FORMED
TROUBLE SWALLOWING: 1

## 2022-12-07 NOTE — HOSPICE
Skilled Nursing Comprehensive visit completed: Suni Montes lives with spouse in their home. Daughter Janiya Bravo and [employer] care for Suni Montes. Pt. resting on her right side. Vts. Assessed WNL's See ROS. Pt. is bedbound and is totally dependent for all ADLS. Pt. sleeps 20+ hrs. a day. Pt. is totally dependent for all ADL's. PPS 20 %. Upper extremities are contracted and lower are stiff. Pt. has paid CG's that turn and reposition her q 2 hrs. Pt's hips and heels are floated. Her skin is very fragile but intact. Zinc paste used by PCG's Incontinent of bowels and bladder wears briefs. RMUAC 21.0 cm. Other signs of weight loss are her sunken cheek bones, bitemporal wasting, prominent clavicles and bony prominences. Suni Montes is eating 85- 90% of her normal amt. most days. Her foods must be very soft or pureed. Pt. must be fed it can take 2 hrs. for her to eat. Fluid intake has always been good, CG's use 10 cc syringe. Pt. is primarily non- verbal. Suni Montes has liquid meds for prn use. Med rec completed. Pt. has no regular daily meds. No RF needed. Janiya Bravo will administer if necessary but does not like to use. Supplies ordered. No DME ordered. Review with CG's q visit for safety measures and precautions. Bed rails up x 2 at all times. CG knows how to reach [facility] for any needs or concerns.

## 2022-12-09 ENCOUNTER — HOME CARE VISIT (OUTPATIENT)
Dept: SCHEDULING | Facility: HOME HEALTH | Age: 87
End: 2022-12-09
Payer: MEDICARE

## 2022-12-09 PROCEDURE — G0156 HHCP-SVS OF AIDE,EA 15 MIN: HCPCS

## 2022-12-12 ENCOUNTER — HOME CARE VISIT (OUTPATIENT)
Dept: SCHEDULING | Facility: HOME HEALTH | Age: 87
End: 2022-12-12
Payer: MEDICARE

## 2022-12-12 PROCEDURE — G0156 HHCP-SVS OF AIDE,EA 15 MIN: HCPCS

## 2022-12-14 ENCOUNTER — HOME CARE VISIT (OUTPATIENT)
Dept: HOSPICE | Facility: HOSPICE | Age: 87
End: 2022-12-14
Payer: MEDICARE

## 2022-12-14 ENCOUNTER — HOME CARE VISIT (OUTPATIENT)
Dept: SCHEDULING | Facility: HOME HEALTH | Age: 87
End: 2022-12-14
Payer: MEDICARE

## 2022-12-14 VITALS
OXYGEN SATURATION: 94 % | TEMPERATURE: 98.5 F | SYSTOLIC BLOOD PRESSURE: 147 MMHG | RESPIRATION RATE: 21 BRPM | HEART RATE: 85 BPM | DIASTOLIC BLOOD PRESSURE: 96 MMHG

## 2022-12-14 PROCEDURE — G0155 HHCP-SVS OF CSW,EA 15 MIN: HCPCS

## 2022-12-14 PROCEDURE — G0156 HHCP-SVS OF AIDE,EA 15 MIN: HCPCS

## 2022-12-14 PROCEDURE — G0299 HHS/HOSPICE OF RN EA 15 MIN: HCPCS

## 2022-12-14 ASSESSMENT — ENCOUNTER SYMPTOMS
DYSPNEA ACTIVITY LEVEL: AT REST
BOWEL INCONTINENCE: 1
STOOL DESCRIPTION: SOFT FORMED

## 2022-12-14 NOTE — HOSPICE
Skilled Nursing Comprehensive visit completed: Lorenza Aleman lives with spouse in their home. Daughter Radha Olea and [employer] care for Lorenza Aleman. Pt. resting on her right sidewhen I arrived for my HV. pt. was alittle agtated because I woke her up. Vts. Assessed WNL's See ROS. Pt. is bedbound and is totally dependent for all ADLS. Pt. sleeps 20+ hrs. a day. Pt. is totally dependent for all ADL's. PPS 20 %. Upper extremities are contracted and lower are stiff. Pt. has paid CG's that turn and reposition her q 2 hrs. Pt's hips and heels are floated. Her skin is very fragile but intact. Zinc paste used by PCG's Incontinent of bowels and bladder wears briefs. RMUAC 21.0 cm. Other signs of weight loss are her sunken cheek bones, bitemporal wasting, prominent clavicles and bony prominences. Lorenza Aleman is eating 85- 90% of her normal amt. most days. Her foods must be very soft or pureed. Pt. must be fed it can take 2 hrs. for her to eat. Fluid intake has always been good, CG's use 10 cc syringe. Pt. is primarily non- verbal. Lorenza Aleman has liquid meds for prn use. Med rec completed. Pt. has no regular daily meds. No RF needed. Radha Olea will administer if necessary but does not like to use. Supplies ordered. DME ordered. Review with CG's q visit for safety measures and precautions. Bed rails up x 2 at all times. CG knows how to reach [facility] for any needs or concerns.

## 2022-12-16 ENCOUNTER — HOME CARE VISIT (OUTPATIENT)
Dept: SCHEDULING | Facility: HOME HEALTH | Age: 87
End: 2022-12-16
Payer: MEDICARE

## 2022-12-16 PROCEDURE — G0156 HHCP-SVS OF AIDE,EA 15 MIN: HCPCS

## 2022-12-17 ASSESSMENT — ENCOUNTER SYMPTOMS: HEMOPTYSIS: 0

## 2022-12-17 NOTE — HOSPICE
Javan Jose is awake and will look at whoever is talking. She is not verbal today. She did, however, eat a candy bar from her [place] bag. Evelina Addison is napping in his recliner. He has recovered from his oral infections. Sydney Teran is out running errands. She provides care for both parents. Maren, the hired caregiver, reports no changes and denies any needs.   SW offered emotional support and availability

## 2022-12-19 ENCOUNTER — HOME CARE VISIT (OUTPATIENT)
Dept: SCHEDULING | Facility: HOME HEALTH | Age: 87
End: 2022-12-19
Payer: MEDICARE

## 2022-12-19 PROCEDURE — G0156 HHCP-SVS OF AIDE,EA 15 MIN: HCPCS

## 2022-12-21 ENCOUNTER — HOME CARE VISIT (OUTPATIENT)
Dept: SCHEDULING | Facility: HOME HEALTH | Age: 87
End: 2022-12-21
Payer: MEDICARE

## 2022-12-21 PROCEDURE — G0156 HHCP-SVS OF AIDE,EA 15 MIN: HCPCS

## 2022-12-22 ENCOUNTER — HOME CARE VISIT (OUTPATIENT)
Dept: SCHEDULING | Facility: HOME HEALTH | Age: 87
End: 2022-12-22
Payer: MEDICARE

## 2022-12-22 VITALS
RESPIRATION RATE: 18 BRPM | TEMPERATURE: 98.1 F | HEART RATE: 65 BPM | SYSTOLIC BLOOD PRESSURE: 149 MMHG | OXYGEN SATURATION: 94 % | DIASTOLIC BLOOD PRESSURE: 90 MMHG

## 2022-12-22 PROCEDURE — G0299 HHS/HOSPICE OF RN EA 15 MIN: HCPCS

## 2022-12-22 ASSESSMENT — ENCOUNTER SYMPTOMS
BOWEL INCONTINENCE: 1
STOOL DESCRIPTION: SOFT FORMED

## 2022-12-22 NOTE — HOSPICE
Skilled Nursing Comprehensive visit completed: Alma Malone lives with spouse in their home. Daughter Leroy Rosales and [employer] care for Alma Malone. Pt. resting on her right side when I arrived for my HV. pt. was alittle agtated because I woke her up. Vts. Assessed WNL's See ROS. Pt. is bedbound and is totally dependent for all ADLS. Pt. sleeps 20+ hrs. a day. Pt. is totally dependent for all ADL's. PPS 20 %. Upper extremities are contracted and lower are stiff. Pt. has paid CG's that turn and reposition her q 2 hrs. Pt's hips and heels are floated. Her skin is very fragile but intact. Zinc paste used by PCG's Incontinent of bowels and bladder wears briefs. RMUAC 21.0 cm. Other signs of weight loss are her sunken cheek bones, bitemporal wasting, prominent clavicles and bony prominences. Alma Malone is eating 85- 90% of her normal amt. most days. Her foods must be very soft or pureed. Pt. must be fed it can take 2 hrs. for her to eat. Fluid intake has always been good, CG's use 10 cc syringe. Pt. is primarily non- verbal. Alma Malone has liquid meds for prn use. Med rec completed. Pt. has no regular daily meds. No RF needed. Leroy Rosales will administer if necessary but does not like to use. Supplies ordered. DME ordered. Review with CG's q visit for safety measures and precautions. Bed rails up x 2 at all times. CG knows how to reach [facility] for any needs or concerns. Impending inclement weather [facility] with [facility]  inclement weather over the next several days. Emergency plan was reviewed from patients admission booklet. The following questions/topics were reviewed with the patient:    · Some temperatures over the next week will be below freezing. This may result in freezing of exposed water lines. The weather may also result in gusty winds which may lead to power outages.   · Do you have all the supplies included but not limited to food, water, medications including O2, and medical equipment you need in the home to ensure safety if unable to leave home due to weather event? Yes  o If no, list supplies needed and action plan for obtaining supplies: Supplies ordered  · If applicable: Do you have back up tanks for O2 in case your power is out in your home?  Pt not on oxygen    · DME company you use for O2: NA

## 2022-12-23 ENCOUNTER — HOME CARE VISIT (OUTPATIENT)
Dept: SCHEDULING | Facility: HOME HEALTH | Age: 87
End: 2022-12-23
Payer: MEDICARE

## 2022-12-23 PROCEDURE — G0156 HHCP-SVS OF AIDE,EA 15 MIN: HCPCS

## 2022-12-26 ENCOUNTER — HOME CARE VISIT (OUTPATIENT)
Dept: SCHEDULING | Facility: HOME HEALTH | Age: 87
End: 2022-12-26
Payer: MEDICARE

## 2022-12-28 ENCOUNTER — HOME CARE VISIT (OUTPATIENT)
Dept: SCHEDULING | Facility: HOME HEALTH | Age: 87
End: 2022-12-28
Payer: MEDICARE

## 2022-12-28 VITALS
DIASTOLIC BLOOD PRESSURE: 55 MMHG | HEART RATE: 72 BPM | SYSTOLIC BLOOD PRESSURE: 99 MMHG | RESPIRATION RATE: 20 BRPM | TEMPERATURE: 98.6 F

## 2022-12-28 PROCEDURE — G0156 HHCP-SVS OF AIDE,EA 15 MIN: HCPCS

## 2022-12-28 PROCEDURE — G0299 HHS/HOSPICE OF RN EA 15 MIN: HCPCS

## 2022-12-28 ASSESSMENT — ENCOUNTER SYMPTOMS
BOWEL INCONTINENCE: 1
STOOL DESCRIPTION: SOFT FORMED
TROUBLE SWALLOWING: 1

## 2022-12-29 NOTE — HOSPICE
Skilled Nursing Comprehensive visit completed: Alex Moon lives with spouse in their home. Daughter Toni Ramos and [employer] care for Alex Moon. Pt. resting on her right side when I arrived for my HV. pt. was asleep and  I woke her up. Vts. Assessed B/P 99/55 otherwise vts are good. See ROS. Pt. is bedbound and is totally dependent for all ADLS. Pt. sleeps 20+ hrs. a day. Pt. is totally dependent for all ADL's. PPS 20 %. Upper extremities are contracted and lower are stiff. Pt. has paid CG's that turn and reposition her q 2 hrs. Pt's hips and heels are floated. Her skin is very fragile but intact. Zinc paste used by PCG's Incontinent of bowels and bladder wears briefs. RMUAC 21.0 cm. Other signs of weight loss are her sunken cheek bones, bitemporal wasting, prominent clavicles and bony prominences. Alex Moon is eating 85- 90% of her normal amt. most days. Her foods must be very soft or pureed. Pt. must be fed it can take 2 hrs. for her to eat. Fluid intake has always been good, CG's use 10 cc syringe. Pt. is primarily non- verbal. Alex Moon has liquid meds for prn use. Med rec completed. Pt. has no regular daily meds. No RF needed. Toni Corteser will administer if necessary but does not like to use. Supplies ordered. DME ordered. Review with CG's q visit for safety measures and precautions. Bed rails up x 2 at all times. CG knows how to reach [facility] for any needs or concerns. Impending inclement weather Preparedness Plan

## 2022-12-30 ENCOUNTER — HOME CARE VISIT (OUTPATIENT)
Dept: SCHEDULING | Facility: HOME HEALTH | Age: 87
End: 2022-12-30
Payer: MEDICARE

## 2022-12-30 PROCEDURE — G0156 HHCP-SVS OF AIDE,EA 15 MIN: HCPCS

## 2023-01-01 ENCOUNTER — HOME CARE VISIT (OUTPATIENT)
Dept: SCHEDULING | Facility: HOME HEALTH | Age: 88
End: 2023-01-01
Payer: MEDICARE

## 2023-01-01 ENCOUNTER — HOME CARE VISIT (OUTPATIENT)
Dept: HOSPICE | Facility: HOSPICE | Age: 88
End: 2023-01-01
Payer: MEDICARE

## 2023-01-01 VITALS
TEMPERATURE: 97.5 F | RESPIRATION RATE: 12 BRPM | DIASTOLIC BLOOD PRESSURE: 46 MMHG | HEART RATE: 100 BPM | SYSTOLIC BLOOD PRESSURE: 86 MMHG

## 2023-01-01 VITALS
SYSTOLIC BLOOD PRESSURE: 88 MMHG | HEART RATE: 74 BPM | RESPIRATION RATE: 12 BRPM | TEMPERATURE: 97.2 F | DIASTOLIC BLOOD PRESSURE: 50 MMHG

## 2023-01-01 VITALS
SYSTOLIC BLOOD PRESSURE: 104 MMHG | HEART RATE: 96 BPM | TEMPERATURE: 97.2 F | RESPIRATION RATE: 14 BRPM | DIASTOLIC BLOOD PRESSURE: 64 MMHG

## 2023-01-01 PROCEDURE — G0155 HHCP-SVS OF CSW,EA 15 MIN: HCPCS

## 2023-01-01 PROCEDURE — G0299 HHS/HOSPICE OF RN EA 15 MIN: HCPCS

## 2023-01-01 PROCEDURE — G0156 HHCP-SVS OF AIDE,EA 15 MIN: HCPCS

## 2023-01-01 ASSESSMENT — ENCOUNTER SYMPTOMS
BOWEL INCONTINENCE: 1
HEMOPTYSIS: 0
STOOL DESCRIPTION: SMEARS IN UNDERWEAR
HEMOPTYSIS: 0
HEMOPTYSIS: 0

## 2023-01-02 ENCOUNTER — HOME CARE VISIT (OUTPATIENT)
Dept: HOSPICE | Facility: HOSPICE | Age: 88
End: 2023-01-02
Payer: MEDICARE

## 2023-01-04 ENCOUNTER — HOME CARE VISIT (OUTPATIENT)
Dept: SCHEDULING | Facility: HOME HEALTH | Age: 88
End: 2023-01-04
Payer: MEDICARE

## 2023-01-04 VITALS
HEART RATE: 60 BPM | RESPIRATION RATE: 18 BRPM | SYSTOLIC BLOOD PRESSURE: 124 MMHG | TEMPERATURE: 98.6 F | OXYGEN SATURATION: 96 % | DIASTOLIC BLOOD PRESSURE: 76 MMHG

## 2023-01-04 PROCEDURE — G0156 HHCP-SVS OF AIDE,EA 15 MIN: HCPCS

## 2023-01-04 PROCEDURE — G0299 HHS/HOSPICE OF RN EA 15 MIN: HCPCS

## 2023-01-04 ASSESSMENT — ENCOUNTER SYMPTOMS
STOOL DESCRIPTION: SOFT FORMED
TROUBLE SWALLOWING: 1
BOWEL INCONTINENCE: 1

## 2023-01-04 NOTE — HOSPICE
Skilled Nursing Comprehensive visit completed: Urszula Espinosa lives with spouse in their home. Daughter Estela Morrison and [employer] care for Urszula Espinosa. Pt. resting on her back/snoring when I arrived for my HV. pt. was asleep and I woke her up. Vts. Assessed B/P 124/76  P 60 . See ROS. Pt. is bedbound and is totally dependent for all ADLS. Pt. sleeps 20+ hrs. a day. Pt. is totally dependent for all ADL's. PPS 20 %. Upper extremities are contracted and lower are stiff. Pt. has paid CG's that turn and reposition her q 2 hrs. Pt's hips and heels are floated. Her skin is very fragile but intact. Zinc paste used by PCG's Incontinent of bowels and bladder wears briefs. RMUAC 21.0 cm. Other signs of weight loss are her sunken cheek bones, bitemporal wasting, prominent clavicles and bony prominences. Urszula Espinosa is eating 85- 90% of her normal amt. most days. Her foods must be very soft or pureed. Pt. must be fed it can take 2 hrs. for her to eat. Fluid intake has always been good, CG's use 10 cc syringe. Pt. is primarily non- verbal. Urszula Espinosa has liquid meds for prn use. Med rec completed. Pt. has no regular daily meds. No RF needed. Estela Morrison will administer if necessary but does not like to use. Supplies ordered. DME ordered. Review with CG's q visit for safety measures and precautions. Bed rails up x 2 at all times. CG knows how to reach [facility] for any needs or concerns.

## 2023-01-06 ENCOUNTER — HOME CARE VISIT (OUTPATIENT)
Dept: HOSPICE | Facility: HOSPICE | Age: 88
End: 2023-01-06
Payer: MEDICARE

## 2023-01-09 ENCOUNTER — HOME CARE VISIT (OUTPATIENT)
Dept: SCHEDULING | Facility: HOME HEALTH | Age: 88
End: 2023-01-09
Payer: MEDICARE

## 2023-01-09 PROCEDURE — G0156 HHCP-SVS OF AIDE,EA 15 MIN: HCPCS

## 2023-01-11 ENCOUNTER — HOME CARE VISIT (OUTPATIENT)
Dept: SCHEDULING | Facility: HOME HEALTH | Age: 88
End: 2023-01-11
Payer: MEDICARE

## 2023-01-11 VITALS
TEMPERATURE: 98.7 F | DIASTOLIC BLOOD PRESSURE: 92 MMHG | OXYGEN SATURATION: 98 % | HEART RATE: 67 BPM | SYSTOLIC BLOOD PRESSURE: 149 MMHG | RESPIRATION RATE: 21 BRPM

## 2023-01-11 PROCEDURE — G0299 HHS/HOSPICE OF RN EA 15 MIN: HCPCS

## 2023-01-11 PROCEDURE — G0156 HHCP-SVS OF AIDE,EA 15 MIN: HCPCS

## 2023-01-11 ASSESSMENT — ENCOUNTER SYMPTOMS
TROUBLE SWALLOWING: 1
BOWEL INCONTINENCE: 1
STOOL DESCRIPTION: SOFT FORMED

## 2023-01-11 NOTE — HOSPICE
Recertification visit: Juliet Zhu lives with elderly spouse in their home. Daughter Debby Sanders and [employer] care for Juliet Zhu. Pt. resting on her right side when I arrived for my HV. pt. was asleep and I woke her up. Vts. Assessed B/P 149/92 P 67. See ROS. Pt. is bedbound and is totally dependent for all ADLS. Pt. sleeps 20+ hrs. a day. Pt. is totally dependent for all ADL's. PPS 20 %. Upper extremities are contracted and lower are stiff. Pt. has paid CG's that turn and reposition her q 2 hrs. Pt's hips and heels are floated. Her skin is very fragile but intact. Zinc paste used by PCG's Incontinent of bowels and bladder wears briefs. RMUAC 21.0 cm. Other signs of weight loss are her sunken cheek bones, bitemporal wasting, prominent clavicles and bony prominences. Juliet Zhu is eating 85- 90% of her normal amt. most days. Her foods must be very soft or pureed. Pt. must be fed it can take 2 hrs. for her to eat. Fluid intake has always been good, CG's use 10 cc syringe. Pt. is primarily non- verbal. Juliet Zhu has liquid meds for prn use. Med rec completed. Pt. has no regular daily meds. No RF needed. Debby Sanders will administer if necessary but does not like to use. Supplies ordered. DME ordered. Review with CG's q visit for safety measures and precautions. Bed rails up x 2 at all times. CG knows how to reach [facility] for any needs or concerns.

## 2023-01-13 ENCOUNTER — HOME CARE VISIT (OUTPATIENT)
Dept: SCHEDULING | Facility: HOME HEALTH | Age: 88
End: 2023-01-13
Payer: MEDICARE

## 2023-01-13 PROCEDURE — G0156 HHCP-SVS OF AIDE,EA 15 MIN: HCPCS

## 2023-01-16 ENCOUNTER — HOME CARE VISIT (OUTPATIENT)
Dept: SCHEDULING | Facility: HOME HEALTH | Age: 88
End: 2023-01-16
Payer: MEDICARE

## 2023-01-16 VITALS
HEART RATE: 55 BPM | TEMPERATURE: 98.9 F | OXYGEN SATURATION: 95 % | DIASTOLIC BLOOD PRESSURE: 66 MMHG | RESPIRATION RATE: 18 BRPM | SYSTOLIC BLOOD PRESSURE: 134 MMHG

## 2023-01-16 PROCEDURE — G0299 HHS/HOSPICE OF RN EA 15 MIN: HCPCS

## 2023-01-16 PROCEDURE — G0156 HHCP-SVS OF AIDE,EA 15 MIN: HCPCS

## 2023-01-16 ASSESSMENT — ENCOUNTER SYMPTOMS
BOWEL INCONTINENCE: 1
STOOL DESCRIPTION: SOFT FORMED

## 2023-01-16 NOTE — HOSPICE
Skilled Nursing Comprehensive visit. Андрей Trinidad NP did Recertification visit today. Kaitlin Rahman lives with elderly spouse in their home. Daughter Sedrick Zuniga and [employer] care for Kaitlin Rahman. Pt. resting on her back side when we arrived for my HV. Pt. was asleep with her eyes open and snoring as she had just finished her bath before we arrived. She slept thru our visit. Vts. Assessed B/P 134/66 P 56. See ROS. Pt. is bedbound and is totally dependent for all ADLS. Pt. sleeps 20+ hrs. a day. Pt. is totally dependent for all ADL's. PPS 20 %. Upper extremities are contracted and lower are stiff. Pt. has paid CG's that turn and reposition her q 2 hrs. Pt's hips and heels are floated. Her skin is very fragile but intact. Zinc paste used by PCG's Incontinent of bowels and bladder wears briefs. RMUAC 21.0 cm. Other signs of weight loss are her sunken cheek bones, bitemporal wasting, prominent clavicles and bony prominences. Kaitlin Rahman is eating 85- 90% of her normal amt. most days. Her foods must be very soft or pureed. Pt. must be fed it can take 2 hrs. for her to eat. Fluid intake has always been good, CG's use 10 cc syringe. Pt. is primarily non- verbal. Kaitlin Rahman has liquid meds for prn use. Med rec completed. Pt. has no regular daily meds. No RF needed. Sedrick Zuniga will administer if necessary but does not like to use. No supplies needed. No DME ordered. Review with CG's q visit for safety measures and precautions. Bed rails up x 2 at all times. CG knows how to reach [facility] for any needs or concerns.

## 2023-01-17 ENCOUNTER — HOME CARE VISIT (OUTPATIENT)
Dept: SCHEDULING | Facility: HOME HEALTH | Age: 88
End: 2023-01-17
Payer: MEDICARE

## 2023-01-18 ENCOUNTER — HOME CARE VISIT (OUTPATIENT)
Dept: SCHEDULING | Facility: HOME HEALTH | Age: 88
End: 2023-01-18
Payer: MEDICARE

## 2023-01-18 PROCEDURE — G0156 HHCP-SVS OF AIDE,EA 15 MIN: HCPCS

## 2023-01-18 PROCEDURE — G0155 HHCP-SVS OF CSW,EA 15 MIN: HCPCS

## 2023-01-18 NOTE — HOSPICE
SW reviewed OA team member's recent notes and spoke with [facility] primary nurse, Ana Whaley prior to today's routine visit. SW was greeted at the home by Pt.'s private caregiver, Sade and invited into visit. Pt.'s  Hector was asleep in his recliner. Pt was lying in her hospital bed with her eyes closed and appeared to be resting comfortably throughout the visit. Pt.'s daughter, Kimmie joined the visit a little while after SW arrived.   Pt.'s daughter and caregiver report no new concerns or needs at this time. SW provided emotional support for Pt.'s daughter and caregiver via active listening along with validation of their feelings. Pt.'s daughter and caregiver  expressed appreciation for the visit and continued support. No changes in the plan of care. SW will continue to provide emotional support and assessment of psychosocial and bereavement concerns and needs.

## 2023-01-20 ENCOUNTER — HOME CARE VISIT (OUTPATIENT)
Dept: SCHEDULING | Facility: HOME HEALTH | Age: 88
End: 2023-01-20
Payer: MEDICARE

## 2023-01-20 PROCEDURE — G0156 HHCP-SVS OF AIDE,EA 15 MIN: HCPCS

## 2023-01-23 ENCOUNTER — HOME CARE VISIT (OUTPATIENT)
Dept: SCHEDULING | Facility: HOME HEALTH | Age: 88
End: 2023-01-23
Payer: MEDICARE

## 2023-01-23 VITALS
SYSTOLIC BLOOD PRESSURE: 117 MMHG | RESPIRATION RATE: 16 BRPM | HEART RATE: 70 BPM | DIASTOLIC BLOOD PRESSURE: 72 MMHG | TEMPERATURE: 98.3 F | OXYGEN SATURATION: 97 %

## 2023-01-23 PROCEDURE — G0156 HHCP-SVS OF AIDE,EA 15 MIN: HCPCS

## 2023-01-23 PROCEDURE — G0299 HHS/HOSPICE OF RN EA 15 MIN: HCPCS

## 2023-01-23 ASSESSMENT — ENCOUNTER SYMPTOMS
BOWEL INCONTINENCE: 1
STOOL DESCRIPTION: SOFT FORMED

## 2023-01-23 NOTE — HOSPICE
Skilled Comprehensive Nursing visit completed:  Yahaira Olvera lives with elderly spouse in their home. Daughter Kristin Dover and [employer] care for Yahaira Olvera. Pt. resting on her right side when I arrived for my HV. pt. was asleep and I woke her up. Vts. Assessed B/P 117/72 P 70. See ROS. Pt. is bedbound and is totally dependent for all ADLS. Pt. sleeps 20+ hrs. a day. Pt. is totally dependent for all ADL's. PPS 20 %. Upper extremities are contracted and lower are stiff. Pt. has paid CG's that turn and reposition her q 2 hrs. Pt's hips and heels are floated. Her skin is very fragile but intact. Zinc paste used by PCG's Incontinent of bowels and bladder wears briefs. RMUAC 21.0 cm. Other signs of weight loss are her sunken cheek bones, bitemporal wasting, prominent clavicles and bony prominences. Yahaira Olvera is eating 85- 90% of her normal amt. most days. Her foods must be very soft or pureed. Pt. must be fed it can take 2 hrs. for her to eat. Fluid intake has always been good, CG's use 10 cc syringe. Pt. is primarily non- verbal. Yahaira Olvera has liquid meds for prn use. Med rec completed. Pt. has no regular daily meds. No RF needed. Kristin Dover will administer if necessary but does not like to use. Supplies ordered. DME ordered. Review with CG's q visit for safety measures and precautions. Bed rails up x 2 at all times. CG knows how to reach [facility] for any needs or concerns.

## 2023-01-25 ENCOUNTER — HOME CARE VISIT (OUTPATIENT)
Dept: SCHEDULING | Facility: HOME HEALTH | Age: 88
End: 2023-01-25
Payer: MEDICARE

## 2023-01-25 PROCEDURE — G0156 HHCP-SVS OF AIDE,EA 15 MIN: HCPCS

## 2023-01-27 ENCOUNTER — HOME CARE VISIT (OUTPATIENT)
Dept: SCHEDULING | Facility: HOME HEALTH | Age: 88
End: 2023-01-27
Payer: MEDICARE

## 2023-01-27 PROCEDURE — G0156 HHCP-SVS OF AIDE,EA 15 MIN: HCPCS

## 2023-01-30 ENCOUNTER — HOME CARE VISIT (OUTPATIENT)
Dept: SCHEDULING | Facility: HOME HEALTH | Age: 88
End: 2023-01-30
Payer: MEDICARE

## 2023-01-30 PROCEDURE — G0156 HHCP-SVS OF AIDE,EA 15 MIN: HCPCS

## 2023-02-01 ENCOUNTER — HOME CARE VISIT (OUTPATIENT)
Dept: SCHEDULING | Facility: HOME HEALTH | Age: 88
End: 2023-02-01
Payer: MEDICARE

## 2023-02-01 VITALS
HEART RATE: 60 BPM | DIASTOLIC BLOOD PRESSURE: 93 MMHG | SYSTOLIC BLOOD PRESSURE: 150 MMHG | TEMPERATURE: 98.4 F | RESPIRATION RATE: 18 BRPM

## 2023-02-01 PROCEDURE — G0299 HHS/HOSPICE OF RN EA 15 MIN: HCPCS

## 2023-02-01 PROCEDURE — G0156 HHCP-SVS OF AIDE,EA 15 MIN: HCPCS

## 2023-02-01 ASSESSMENT — ENCOUNTER SYMPTOMS
STOOL DESCRIPTION: SOFT FORMED
BOWEL INCONTINENCE: 1

## 2023-02-01 NOTE — HOSPICE
Skilled Comprehensive Nursing visit completed: Ventura Chowdary lives with elderly spouse in their home. Daughter Tenisha Alvarez and [employer] care for Ventura Chowdary. Pt. resting on her back when I arrived for my HV. pt. was asleep and I woke her up. Vts. Assessed B/P 150/93 pt. was agitated. . P 60. See ROS. Pt. is bedbound and is totally dependent for all ADLS. Pt. sleeps 20+ hrs. a day. Pt. is totally dependent for all ADL's. PPS 20 %. Upper extremities are contracted and lower are stiff. Pt. does not appear to be able to track you when you speak to her, normally she would try to turn her head when she hears a voice. Pt. has paid CG's that turn and reposition her q 2 hrs. Pt's hips and heels are floated. Her skin is very fragile but intact. Zinc paste used by PCG's Incontinent of bowels and bladder wears briefs. LMUCC 21.0 cm. Other signs of weight loss are her sunken cheek bones, bitemporal wasting, prominent clavicles and bony prominences. Sitter reports pt. is only gettin in about 2 full meals but she eats well. She tends to sleep through lunch time now. Ventura Chowdary is eating 85- 90% of her normal amt. most days. Her foods must be very soft or pureed. Pt. must be fed it can take 2 hrs. for her to eat. Fluid intake has always been good, CG's use 10 cc syringe. Pt. is primarily non- verbal. Ventura Chowdary has liquid meds for prn use. Med rec completed. Pt. has no regular daily meds. No RF needed. Tenisha Alvarez will administer if necessary but does not like to use. Supplies ordered. DME ordered. Review with CG's q visit for safety measures and precautions. Bed rails up x 2 at all times. CG knows how to reach [facility] for any needs or concerns.

## 2023-02-03 ENCOUNTER — HOME CARE VISIT (OUTPATIENT)
Dept: SCHEDULING | Facility: HOME HEALTH | Age: 88
End: 2023-02-03
Payer: MEDICARE

## 2023-02-03 PROCEDURE — G0156 HHCP-SVS OF AIDE,EA 15 MIN: HCPCS

## 2023-02-06 ENCOUNTER — HOME CARE VISIT (OUTPATIENT)
Dept: SCHEDULING | Facility: HOME HEALTH | Age: 88
End: 2023-02-06
Payer: MEDICARE

## 2023-02-06 PROCEDURE — G0156 HHCP-SVS OF AIDE,EA 15 MIN: HCPCS

## 2023-02-08 ENCOUNTER — HOME CARE VISIT (OUTPATIENT)
Dept: SCHEDULING | Facility: HOME HEALTH | Age: 88
End: 2023-02-08
Payer: MEDICARE

## 2023-02-08 VITALS
TEMPERATURE: 98.4 F | HEART RATE: 65 BPM | DIASTOLIC BLOOD PRESSURE: 66 MMHG | RESPIRATION RATE: 16 BRPM | SYSTOLIC BLOOD PRESSURE: 110 MMHG

## 2023-02-08 PROCEDURE — G0299 HHS/HOSPICE OF RN EA 15 MIN: HCPCS

## 2023-02-08 PROCEDURE — G0156 HHCP-SVS OF AIDE,EA 15 MIN: HCPCS

## 2023-02-08 PROCEDURE — G0155 HHCP-SVS OF CSW,EA 15 MIN: HCPCS

## 2023-02-08 ASSESSMENT — ENCOUNTER SYMPTOMS
STOOL DESCRIPTION: SOFT FORMED
BOWEL INCONTINENCE: 1

## 2023-02-08 NOTE — HOSPICE
Skilled Comprehensive Nursing visit completed: Beba Byrne lives with elderly spouse in their home. We  met with son Kyle Cantor and [employer] PCG care for Beba Byrne. During last weeks visit Naa the sitter misunderstood what I said about her hand being swollen. Beba Byrne left had appeared alittle swollen it is fine now. I told her that it may be positional but sometimes this happens. She blew it out of proportion. Daughter called and requested a mtg. I told both children that had there been concern I would have called them immediately. Pt. resting on her back when MSW and  I arrived for my HV. pt. was asleep and I woke her up. Vts. Assessed B/P 110/66 pt. was agitated. . P 65. See ROS. Pt. is bedbound and is totally dependent for all ADLS. Pt. sleeps 20+ hrs. a day. Pt. is totally dependent for all ADL's. PPS 30 %. Upper extremities are contracted and lower are stiff. Pt. does not appear to be able to track you when you speak to her, normally she would try to turn her head when she hears a voice. Pt. has paid CG's that turn and reposition her q 2 hrs. Pt's hips and heels are floated. Her skin is very fragile but intact. Zinc paste used by PCG's Incontinent of bowels and bladder wears briefs. LMUCC 24.0 cm. Other signs of weight loss are her sunken cheek bones, bitemporal wasting, prominent clavicles and bony prominences. Sitter reports pt. is only gettin in about 2 full meals but she eats well. She tends to sleep through lunch time now. Beba Byrne is eating 85- 90% of her normal amt. most days. Her foods must be very soft or pureed. Pt. must be fed it can take 2 hrs. for her to eat. Fluid intake has always been good, CG's use 10 cc syringe. Pt. is primarily non- verbal. Beba Byrne has liquid meds for prn use. Med rec completed. Pt. has no regular daily meds. No RF needed. Tres Sebastián will administer if necessary but does not like to use. Supplies ordered. DME ordered. Review with CG's q visit for safety measures and precautions.  Bed rails up x 2 at all times. CG knows how to reach [facility] for any needs or concerns.

## 2023-02-09 NOTE — HOSPICE
SW reviewed [facility] team member's recent notes and spoke with [facility] primary nurse, Itzel Melgar prior to today's routine visit. Nurse and SW were greeted at the home by Pt.'s son Martita Escudero, daughter Vesna Catalan, private caregiver Amena and invited into visit. Pt.'s  Magda Andrews was sitting at the table in the kitchen and did not participate in today's visit. Pt was lying in her hospital bed awake as we entered the bedroom to visit but doesn't respond to verbal stimuli (current baseline). Pt appeared comfortable with no signs of distress noted. Pt.'s family and caregiver report no new concerns or needs at this time. Nurse and SW provided emotional support for Pt.'s family and caregiver via active listening along with validation of their feelings, education and reassurance. Pt.'s family and caregiver  expressed appreciation for the visit and continued support. No changes in the plan of care. SW will continue to provide emotional support and assessment of psychosocial and bereavement concerns and needs.

## 2023-02-10 ENCOUNTER — HOME CARE VISIT (OUTPATIENT)
Dept: SCHEDULING | Facility: HOME HEALTH | Age: 88
End: 2023-02-10
Payer: MEDICARE

## 2023-02-10 PROCEDURE — G0156 HHCP-SVS OF AIDE,EA 15 MIN: HCPCS

## 2023-02-13 ENCOUNTER — HOME CARE VISIT (OUTPATIENT)
Dept: SCHEDULING | Facility: HOME HEALTH | Age: 88
End: 2023-02-13
Payer: MEDICARE

## 2023-02-13 PROCEDURE — G0156 HHCP-SVS OF AIDE,EA 15 MIN: HCPCS

## 2023-02-14 ENCOUNTER — HOME CARE VISIT (OUTPATIENT)
Dept: SCHEDULING | Facility: HOME HEALTH | Age: 88
End: 2023-02-14
Payer: MEDICARE

## 2023-02-15 ENCOUNTER — HOME CARE VISIT (OUTPATIENT)
Dept: SCHEDULING | Facility: HOME HEALTH | Age: 88
End: 2023-02-15
Payer: MEDICARE

## 2023-02-15 VITALS
SYSTOLIC BLOOD PRESSURE: 134 MMHG | HEART RATE: 62 BPM | RESPIRATION RATE: 18 BRPM | TEMPERATURE: 98.3 F | DIASTOLIC BLOOD PRESSURE: 81 MMHG

## 2023-02-15 PROCEDURE — G0299 HHS/HOSPICE OF RN EA 15 MIN: HCPCS

## 2023-02-15 PROCEDURE — G0156 HHCP-SVS OF AIDE,EA 15 MIN: HCPCS

## 2023-02-15 ASSESSMENT — ENCOUNTER SYMPTOMS
BOWEL INCONTINENCE: 1
STOOL DESCRIPTION: SOFT FORMED
TROUBLE SWALLOWING: 1

## 2023-02-15 NOTE — HOSPICE
Skilled Comprehensive Nursing visit completed: Debra Finley lives with elderly spouse in their home. Pt. resting on her back when I arrived for my HV. pt. was asleep and I woke her up. Vts. Assessed B/P 134/81 pt. was relaxed today. P 62. See ROS. Pt. is bedbound and is totally dependent for all ADLS. Pt. sleeps 20+ hrs. a day. Pt. is totally dependent for all ADL's. PPS 30 %. Upper extremities are contracted and lower are stiff. Pt. does not appear to be able to track you when you speak to her. Pt. has paid CG's that turn and reposition her q 2 hrs. Pt's hips and heels are floated. Her skin is very fragile but intact. Zinc paste used by PCG's Incontinent of bowels and bladder wears briefs. LMUCC 24.0 cm. Other signs of weight loss are her sunken cheek bones, bitemporal wasting, prominent clavicles and bony prominences. Sitter reports pt. is only gettin in about 2 full meals but she eats well. She tends to sleep through lunch time now. Debra Finley is eating 85- 90% of her normal amt. most days. Her foods must be very soft or pureed. Pt. must be fed it can take 2 hrs. for her to eat. Fluid intake has always been good, CG's use 10 cc syringe. Pt. is primarily non- verbal. Debra Finley has liquid meds for prn use. Med rec completed. Pt. has no regular daily meds. No RF needed. Artemio Bosworth will administer if necessary but does not like to use. Supplies ordered. DME ordered. Review with CG's q visit for safety measures and precautions. Bed rails up x 2 at all times. CG knows how to reach [facility] for any needs or concerns.

## 2023-02-17 ENCOUNTER — HOME CARE VISIT (OUTPATIENT)
Dept: SCHEDULING | Facility: HOME HEALTH | Age: 88
End: 2023-02-17
Payer: MEDICARE

## 2023-02-17 PROCEDURE — G0156 HHCP-SVS OF AIDE,EA 15 MIN: HCPCS

## 2023-02-20 ENCOUNTER — HOME CARE VISIT (OUTPATIENT)
Dept: SCHEDULING | Facility: HOME HEALTH | Age: 88
End: 2023-02-20
Payer: MEDICARE

## 2023-02-20 PROCEDURE — G0156 HHCP-SVS OF AIDE,EA 15 MIN: HCPCS

## 2023-02-22 ENCOUNTER — HOME CARE VISIT (OUTPATIENT)
Dept: SCHEDULING | Facility: HOME HEALTH | Age: 88
End: 2023-02-22
Payer: MEDICARE

## 2023-02-22 VITALS
OXYGEN SATURATION: 97 % | DIASTOLIC BLOOD PRESSURE: 74 MMHG | HEART RATE: 67 BPM | SYSTOLIC BLOOD PRESSURE: 131 MMHG | TEMPERATURE: 98.3 F | RESPIRATION RATE: 18 BRPM

## 2023-02-22 PROCEDURE — G0156 HHCP-SVS OF AIDE,EA 15 MIN: HCPCS

## 2023-02-22 PROCEDURE — G0299 HHS/HOSPICE OF RN EA 15 MIN: HCPCS

## 2023-02-22 ASSESSMENT — ENCOUNTER SYMPTOMS
STOOL DESCRIPTION: SOFT FORMED
TROUBLE SWALLOWING: 1
BOWEL INCONTINENCE: 1

## 2023-02-22 NOTE — HOSPICE
Skilled Comprehensive Nursing visit completed: Wally Natarajan lives with elderly spouse in their home. Pt. resting on her back when I arrived for my HV. pt. was asleep and I woke her up. Vts. Assessed B/P 131/74 pt. was relaxed today. P 67. See ROS. Pt. is bedbound and is totally dependent for all ADLS. Pt. sleeps 20+ hrs. a day. Pt. is totally dependent for all ADL's. PPS 30 %. Upper extremities are contracted and lower are stiff. Pt. does not appear to be able to track you when you speak to her. Pt. has paid CG's that turn and reposition her q 2 hrs. Pt's hips and heels are floated. Her skin is very fragile but intact. Zinc paste used by PCG's Incontinent of bowels and bladder wears briefs. LMUCC 24.0 cm. Other signs of weight loss are her sunken cheek bones, bitemporal wasting, prominent clavicles and bony prominences. Sitter reports pt. is only gettin in about 2 full meals but she eats well. She tends to sleep through lunch time now. Wally Natarajan is eating 85- 90% of her normal amt. most days. Her foods must be very soft or pureed. Pt. must be fed it can take 2 hrs. for her to eat. Fluid intake has always been good, CG's use 10 cc syringe. Pt. is primarily non- verbal. Wally Natarajan has liquid meds for prn use. Med rec completed. Pt. has no regular daily meds. No RF needed. Marci Galan will administer if necessary, but does not like to use. Supplies None needed. DME ordered. Review with CG's q visit for safety measures and precautions. Bed rails up x 2 at all times. CG knows how to reach [facility] for any needs or concerns.

## 2023-02-24 ENCOUNTER — HOME CARE VISIT (OUTPATIENT)
Dept: SCHEDULING | Facility: HOME HEALTH | Age: 88
End: 2023-02-24
Payer: MEDICARE

## 2023-02-24 PROCEDURE — G0156 HHCP-SVS OF AIDE,EA 15 MIN: HCPCS

## 2023-02-27 ENCOUNTER — HOME CARE VISIT (OUTPATIENT)
Dept: SCHEDULING | Facility: HOME HEALTH | Age: 88
End: 2023-02-27
Payer: MEDICARE

## 2023-02-27 PROCEDURE — G0156 HHCP-SVS OF AIDE,EA 15 MIN: HCPCS

## 2023-03-01 ENCOUNTER — HOME CARE VISIT (OUTPATIENT)
Dept: SCHEDULING | Facility: HOME HEALTH | Age: 88
End: 2023-03-01
Payer: MEDICARE

## 2023-03-01 VITALS
SYSTOLIC BLOOD PRESSURE: 105 MMHG | HEART RATE: 61 BPM | TEMPERATURE: 98.3 F | DIASTOLIC BLOOD PRESSURE: 68 MMHG | RESPIRATION RATE: 18 BRPM | OXYGEN SATURATION: 99 %

## 2023-03-01 PROCEDURE — G0299 HHS/HOSPICE OF RN EA 15 MIN: HCPCS

## 2023-03-01 PROCEDURE — G0156 HHCP-SVS OF AIDE,EA 15 MIN: HCPCS

## 2023-03-01 ASSESSMENT — ENCOUNTER SYMPTOMS
BOWEL INCONTINENCE: 1
TROUBLE SWALLOWING: 1
STOOL DESCRIPTION: SOFT FORMED

## 2023-03-01 NOTE — HOSPICE
Skilled Comprehensive Nursing visit completed: Beba Byrne lives with elderly spouse in their home. Pt. resting on her left side when I arrived for my HV. pt. was awake as she had just had a bath. .Vts. Assessed B/P 105/68 pt. was relaxed today. P 61. See ROS. Pt. is bedbound and is totally dependent for all ADLS. Pt. sleeps 20+ hrs. a day. Pt. is totally dependent for all ADL's. PPS 30 %. Upper extremities are contracted and lower are stiff. Pt. does not appear to be able to track you when you speak to her. Pt. has paid CG's that turn and reposition her q 2 hrs. Pt's hips and heels are floated. Her skin is very fragile but intact. Zinc paste used by PCG's Incontinent of bowels and bladder wears briefs. LMUCC 24.0 cm. Other signs of weight loss are her sunken cheek bones, bitemporal wasting, prominent clavicles and bony prominences. Sitter reports pt. is only gettin in about 2 full meals but she eats well. She tends to sleep through lunch time now. Beba Byrne is eating 85- 90% of her normal amt. most days. Her foods must be very soft or pureed. Pt. must be fed it can take 2 hrs. for her to eat. Fluid intake has always been good, CG's use 10 cc syringe. Pt. is primarily non- verbal. Beba Byrne has liquid meds for prn use. Med rec completed. Pt. has no regular daily meds. No RF needed. Tres Lowery will administer if necessary, but does not like to use. Supplies needed: Ordered. . DME ordered. None Review with CG's q visit for safety measures and precautions. Bed rails up x 2 at all times. CG knows how to reach [facility] for any needs or concerns.

## 2023-03-03 ENCOUNTER — HOME CARE VISIT (OUTPATIENT)
Dept: SCHEDULING | Facility: HOME HEALTH | Age: 88
End: 2023-03-03
Payer: MEDICARE

## 2023-03-03 PROCEDURE — G0156 HHCP-SVS OF AIDE,EA 15 MIN: HCPCS

## 2023-03-06 ENCOUNTER — HOME CARE VISIT (OUTPATIENT)
Dept: SCHEDULING | Facility: HOME HEALTH | Age: 88
End: 2023-03-06
Payer: MEDICARE

## 2023-03-06 PROCEDURE — G0156 HHCP-SVS OF AIDE,EA 15 MIN: HCPCS

## 2023-03-08 ENCOUNTER — HOME CARE VISIT (OUTPATIENT)
Dept: SCHEDULING | Facility: HOME HEALTH | Age: 88
End: 2023-03-08
Payer: MEDICARE

## 2023-03-08 VITALS
OXYGEN SATURATION: 96 % | HEART RATE: 56 BPM | TEMPERATURE: 97.1 F | RESPIRATION RATE: 16 BRPM | SYSTOLIC BLOOD PRESSURE: 108 MMHG | DIASTOLIC BLOOD PRESSURE: 65 MMHG

## 2023-03-08 PROCEDURE — G0156 HHCP-SVS OF AIDE,EA 15 MIN: HCPCS

## 2023-03-08 PROCEDURE — G0299 HHS/HOSPICE OF RN EA 15 MIN: HCPCS

## 2023-03-08 ASSESSMENT — ENCOUNTER SYMPTOMS
STOOL DESCRIPTION: SOFT FORMED
TROUBLE SWALLOWING: 1
BOWEL INCONTINENCE: 1

## 2023-03-08 NOTE — HOSPICE
Recertification visit completed. Yohannes Brown lives with elderly spouse in their home. Daughter Artemio Bosworth and [employer] care for Debra Finley. Pt. resting on her back side when we arrived for my HV. Pt. was  awake when I arrived. She slept thru our visit. Vts. Assessed B/P 108/65 P 56. See ROS. Pt. is bedbound and is totally dependent for all ADLS. Pt. sleeps 20+ hrs. a day. Pt. is totally dependent for all ADL's. PPS 20 %. Upper extremities are contracted and lower are stiff. Pt. has paid CG's that turn and reposition her q 2 hrs. Pt's hips and heels are floated. Her skin is very fragile but intact. Zinc paste used by PCG's Incontinent of bowels and bladder wears briefs. RMUAC 21.0 cm. Other signs of weight loss are her sunken cheek bones, bitemporal wasting, prominent clavicles and bony prominences. Debra Finley is eating 85- 90% of her normal amt. most days. Her foods must be very soft or pureed. Pt. must be fed it can take 2 hrs. for her to eat. Fluid intake has always been good, CG's use 10 cc syringe. Pt. is primarily non- verbal. Debra Finley has liquid meds for prn use. Med rec completed. Pt. has no regular daily meds. I have encouraged use of Liquid Tylenol as ordered  for mild pain. No RF needed. Artemio Bosworth will administer if necessary but does not like to use. No supplies needed. No DME ordered. Review with CG's q visit for safety measures and precautions. Bed rails up x 2 at all times. CG knows how to reach [facility] for any needs or concerns.

## 2023-03-10 ENCOUNTER — HOME CARE VISIT (OUTPATIENT)
Dept: SCHEDULING | Facility: HOME HEALTH | Age: 88
End: 2023-03-10
Payer: MEDICARE

## 2023-03-10 PROCEDURE — G0156 HHCP-SVS OF AIDE,EA 15 MIN: HCPCS

## 2023-03-13 ENCOUNTER — HOME CARE VISIT (OUTPATIENT)
Dept: SCHEDULING | Facility: HOME HEALTH | Age: 88
End: 2023-03-13
Payer: MEDICARE

## 2023-03-13 PROCEDURE — G0156 HHCP-SVS OF AIDE,EA 15 MIN: HCPCS

## 2023-03-15 ENCOUNTER — HOME CARE VISIT (OUTPATIENT)
Dept: SCHEDULING | Facility: HOME HEALTH | Age: 88
End: 2023-03-15
Payer: MEDICARE

## 2023-03-15 PROCEDURE — G0156 HHCP-SVS OF AIDE,EA 15 MIN: HCPCS

## 2023-03-17 ENCOUNTER — HOME CARE VISIT (OUTPATIENT)
Dept: SCHEDULING | Facility: HOME HEALTH | Age: 88
End: 2023-03-17
Payer: MEDICARE

## 2023-03-17 PROCEDURE — G0299 HHS/HOSPICE OF RN EA 15 MIN: HCPCS

## 2023-03-18 ENCOUNTER — HOME CARE VISIT (OUTPATIENT)
Dept: SCHEDULING | Facility: HOME HEALTH | Age: 88
End: 2023-03-18
Payer: MEDICARE

## 2023-03-18 VITALS — HEART RATE: 76 BPM | TEMPERATURE: 98.1 F | RESPIRATION RATE: 16 BRPM

## 2023-03-18 PROCEDURE — G0156 HHCP-SVS OF AIDE,EA 15 MIN: HCPCS

## 2023-03-18 ASSESSMENT — ENCOUNTER SYMPTOMS
STOOL DESCRIPTION: FORMED
BOWEL INCONTINENCE: 1

## 2023-03-20 ENCOUNTER — HOME CARE VISIT (OUTPATIENT)
Dept: SCHEDULING | Facility: HOME HEALTH | Age: 88
End: 2023-03-20
Payer: MEDICARE

## 2023-03-20 PROCEDURE — G0156 HHCP-SVS OF AIDE,EA 15 MIN: HCPCS

## 2023-03-20 NOTE — HOSPICE
TN spoke with Runady Person last evening to coordinate visit time as he anted to be present to meet new CMMariaa Milligan and paid cg Amena plaza at arrival.  [facility] daughter undergoing self quarantine,she tested psoitive for covid on 3/19. Pt alert , Amena interrupted feeding for RN assess. Pt did not speak signs suggestive of her listening Assess as noted. Amena and Srini oriented RN to [employer] and family routines. Meds identified for replacement and incontinent supplies requested. RN will notify cg of next visit tentatively for 3/22 or 3/22. Estephanie Sexton

## 2023-03-22 ENCOUNTER — HOME CARE VISIT (OUTPATIENT)
Dept: SCHEDULING | Facility: HOME HEALTH | Age: 88
End: 2023-03-22
Payer: MEDICARE

## 2023-03-22 PROCEDURE — G0156 HHCP-SVS OF AIDE,EA 15 MIN: HCPCS

## 2023-03-23 ENCOUNTER — HOME CARE VISIT (OUTPATIENT)
Dept: HOSPICE | Facility: HOSPICE | Age: 88
End: 2023-03-23
Payer: MEDICARE

## 2023-03-23 PROCEDURE — G0155 HHCP-SVS OF CSW,EA 15 MIN: HCPCS

## 2023-03-24 ENCOUNTER — HOME CARE VISIT (OUTPATIENT)
Dept: SCHEDULING | Facility: HOME HEALTH | Age: 88
End: 2023-03-24
Payer: MEDICARE

## 2023-03-24 PROCEDURE — G0156 HHCP-SVS OF AIDE,EA 15 MIN: HCPCS

## 2023-03-24 PROCEDURE — G0299 HHS/HOSPICE OF RN EA 15 MIN: HCPCS

## 2023-03-24 ASSESSMENT — ENCOUNTER SYMPTOMS: HEMOPTYSIS: 0

## 2023-03-24 NOTE — HOSPICE
José Luis Ball is being fed lunch during this visit. She is alert and follows with her eyes. She is not verbal today. Her hired caregiver, Claudia Arriola is here today. Yue Winston is talkative and cheerful. She says that she has recovered well from [place] and noone else in the home was infected. She denies any needs presently. AUDREY provided emotional support and active listening.   SW offered availability

## 2023-03-27 ENCOUNTER — HOME CARE VISIT (OUTPATIENT)
Dept: SCHEDULING | Facility: HOME HEALTH | Age: 88
End: 2023-03-27
Payer: MEDICARE

## 2023-03-27 VITALS
RESPIRATION RATE: 16 BRPM | SYSTOLIC BLOOD PRESSURE: 108 MMHG | HEART RATE: 72 BPM | TEMPERATURE: 97.5 F | DIASTOLIC BLOOD PRESSURE: 60 MMHG

## 2023-03-27 PROCEDURE — G0156 HHCP-SVS OF AIDE,EA 15 MIN: HCPCS

## 2023-03-27 ASSESSMENT — ENCOUNTER SYMPTOMS
STOOL DESCRIPTION: SOFT FORMED
BOWEL INCONTINENCE: 1

## 2023-03-29 ENCOUNTER — HOME CARE VISIT (OUTPATIENT)
Dept: SCHEDULING | Facility: HOME HEALTH | Age: 88
End: 2023-03-29
Payer: MEDICARE

## 2023-03-29 PROCEDURE — G0156 HHCP-SVS OF AIDE,EA 15 MIN: HCPCS

## 2023-03-30 ENCOUNTER — HOME CARE VISIT (OUTPATIENT)
Dept: SCHEDULING | Facility: HOME HEALTH | Age: 88
End: 2023-03-30
Payer: MEDICARE

## 2023-03-30 VITALS
DIASTOLIC BLOOD PRESSURE: 69 MMHG | HEART RATE: 98 BPM | TEMPERATURE: 98.4 F | RESPIRATION RATE: 18 BRPM | SYSTOLIC BLOOD PRESSURE: 103 MMHG | OXYGEN SATURATION: 99 %

## 2023-03-30 PROCEDURE — G0299 HHS/HOSPICE OF RN EA 15 MIN: HCPCS

## 2023-03-30 ASSESSMENT — ENCOUNTER SYMPTOMS
BOWEL INCONTINENCE: 1
STOOL DESCRIPTION: SOFT FORMED
HEMOPTYSIS: 0

## 2023-03-30 NOTE — HOSPICE
Javier Pascual is a 80year old female seen today in her home she shares with her 8 year old , BAR. Their daughter, Monisha Qureshi has her own home nearby with her  and stays each night with [address]. There are paid caregivers who work from [address] is the paid caregiver today and informant. Monisha Qureshi is at a physician appointment. Kingsley Guaman has been  bedridden many years, sleeping most of the time and mostly non-verbal.  She opens her eyes to voice and follows this SN with eyes but then goes back to sleep. Kingsley Guaman will also open her eyes and lift her had if you rub her head. B/P 103/69 - HR 98 - RR 16 and easy - Temp 98.4 - O2Sat 99% on room air. Nutrition by feeding and appetite varies but always something 3xday. Fluids per syringe. See also KAROL for details. Paid caregiver Veronica Veliz states Kingsley Guaman does not take any medications. 2 pkgs wipes and 1 pkg diapers left today from this RN's trunk supply.

## 2023-03-31 ENCOUNTER — HOME CARE VISIT (OUTPATIENT)
Dept: SCHEDULING | Facility: HOME HEALTH | Age: 88
End: 2023-03-31
Payer: MEDICARE

## 2023-03-31 PROCEDURE — G0156 HHCP-SVS OF AIDE,EA 15 MIN: HCPCS

## 2023-04-03 ENCOUNTER — HOME CARE VISIT (OUTPATIENT)
Dept: SCHEDULING | Facility: HOME HEALTH | Age: 88
End: 2023-04-03
Payer: MEDICARE

## 2023-04-03 PROCEDURE — G0156 HHCP-SVS OF AIDE,EA 15 MIN: HCPCS

## 2023-04-05 ENCOUNTER — HOME CARE VISIT (OUTPATIENT)
Dept: HOSPICE | Facility: HOSPICE | Age: 88
End: 2023-04-05
Payer: MEDICARE

## 2023-04-05 ENCOUNTER — HOME CARE VISIT (OUTPATIENT)
Dept: SCHEDULING | Facility: HOME HEALTH | Age: 88
End: 2023-04-05
Payer: MEDICARE

## 2023-04-05 PROCEDURE — G0155 HHCP-SVS OF CSW,EA 15 MIN: HCPCS

## 2023-04-05 PROCEDURE — G0156 HHCP-SVS OF AIDE,EA 15 MIN: HCPCS

## 2023-04-06 ENCOUNTER — HOME CARE VISIT (OUTPATIENT)
Dept: SCHEDULING | Facility: HOME HEALTH | Age: 88
End: 2023-04-06
Payer: MEDICARE

## 2023-04-06 PROCEDURE — G0156 HHCP-SVS OF AIDE,EA 15 MIN: HCPCS

## 2023-04-17 ENCOUNTER — HOME CARE VISIT (OUTPATIENT)
Dept: SCHEDULING | Facility: HOME HEALTH | Age: 88
End: 2023-04-17
Payer: MEDICARE

## 2023-04-17 PROCEDURE — G0156 HHCP-SVS OF AIDE,EA 15 MIN: HCPCS

## 2023-04-18 ENCOUNTER — HOME CARE VISIT (OUTPATIENT)
Dept: SCHEDULING | Facility: HOME HEALTH | Age: 88
End: 2023-04-18
Payer: MEDICARE

## 2023-04-19 ENCOUNTER — HOME CARE VISIT (OUTPATIENT)
Dept: HOSPICE | Facility: HOSPICE | Age: 88
End: 2023-04-19
Payer: MEDICARE

## 2023-04-19 ENCOUNTER — HOME CARE VISIT (OUTPATIENT)
Dept: SCHEDULING | Facility: HOME HEALTH | Age: 88
End: 2023-04-19
Payer: MEDICARE

## 2023-04-19 VITALS
HEART RATE: 76 BPM | RESPIRATION RATE: 20 BRPM | TEMPERATURE: 97.2 F | DIASTOLIC BLOOD PRESSURE: 72 MMHG | SYSTOLIC BLOOD PRESSURE: 118 MMHG

## 2023-04-19 PROCEDURE — G0299 HHS/HOSPICE OF RN EA 15 MIN: HCPCS

## 2023-04-19 ASSESSMENT — ENCOUNTER SYMPTOMS
CONTUSION: 1
STOOL DESCRIPTION: SOFT FORMED
BOWEL INCONTINENCE: 1

## 2023-04-19 NOTE — HOSPICE
S: pt's  Sissy Kim in bed in post acute facility for PT at [facility]  at [facility]; not family present  B: routine visit/assessment  A:  Sissy Kim broke hip a few days ago and is undergoing therapy after hip replacement;  appeared less aware/oriented than usual but recognized  and knows where he is and why; said he really wants to go home where he belongs with his wife Leigh Torres; talked re his great/grandchildren who had written him get well notes pasted on mirror in room, saying he loves them all; accepted offer of scripture saying he is glad [employer] F[employer] was resurrected; accepted offer of hym and prayer; sang hardly at all with , ctr to usual singing complete hymns  therapist said Sissy Kim had more visitors   R: assurance of care/availability; active listening;  Mt 28:1-10; prayer   Visit: 1 x / mo.; 2 prn

## 2023-04-20 NOTE — HOSPICE
Crstal feeding patient at arrival. Assess as noted in review and caeplan. Cg reports that she has been doing good since last visit. No new concerns . Reinforced hosie avail as needed.

## 2023-04-21 ENCOUNTER — HOME CARE VISIT (OUTPATIENT)
Dept: SCHEDULING | Facility: HOME HEALTH | Age: 88
End: 2023-04-21
Payer: MEDICARE

## 2023-04-21 PROCEDURE — G0156 HHCP-SVS OF AIDE,EA 15 MIN: HCPCS

## 2023-04-24 ENCOUNTER — HOME CARE VISIT (OUTPATIENT)
Dept: SCHEDULING | Facility: HOME HEALTH | Age: 88
End: 2023-04-24
Payer: MEDICARE

## 2023-04-24 PROCEDURE — G0156 HHCP-SVS OF AIDE,EA 15 MIN: HCPCS

## 2023-04-26 ENCOUNTER — HOME CARE VISIT (OUTPATIENT)
Dept: SCHEDULING | Facility: HOME HEALTH | Age: 88
End: 2023-04-26
Payer: MEDICARE

## 2023-04-26 PROCEDURE — G0156 HHCP-SVS OF AIDE,EA 15 MIN: HCPCS

## 2023-04-28 ENCOUNTER — HOME CARE VISIT (OUTPATIENT)
Dept: SCHEDULING | Facility: HOME HEALTH | Age: 88
End: 2023-04-28
Payer: MEDICARE

## 2023-04-28 VITALS
HEART RATE: 76 BPM | RESPIRATION RATE: 16 BRPM | SYSTOLIC BLOOD PRESSURE: 124 MMHG | TEMPERATURE: 97.2 F | DIASTOLIC BLOOD PRESSURE: 78 MMHG

## 2023-04-28 PROCEDURE — G0156 HHCP-SVS OF AIDE,EA 15 MIN: HCPCS

## 2023-04-28 PROCEDURE — G0299 HHS/HOSPICE OF RN EA 15 MIN: HCPCS

## 2023-04-29 NOTE — HOSPICE
Sade ascencio, repors no skin issues. Crystal paid cg feeding patient. Head of correctly elevated, no choking or swallowing issues noted. Assess as noted. No new issues. Daughter present in home. Next vsit tentatively scehduld for 3/4/23. Reinforced hospice avail as needed.

## 2023-05-01 ENCOUNTER — HOME CARE VISIT (OUTPATIENT)
Dept: SCHEDULING | Facility: HOME HEALTH | Age: 88
End: 2023-05-01
Payer: MEDICARE

## 2023-05-01 PROCEDURE — G0156 HHCP-SVS OF AIDE,EA 15 MIN: HCPCS

## 2023-05-03 ENCOUNTER — HOME CARE VISIT (OUTPATIENT)
Dept: SCHEDULING | Facility: HOME HEALTH | Age: 88
End: 2023-05-03
Payer: MEDICARE

## 2023-05-03 PROCEDURE — G0156 HHCP-SVS OF AIDE,EA 15 MIN: HCPCS

## 2023-05-05 ENCOUNTER — HOME CARE VISIT (OUTPATIENT)
Dept: SCHEDULING | Facility: HOME HEALTH | Age: 88
End: 2023-05-05
Payer: MEDICARE

## 2023-05-05 PROCEDURE — G0156 HHCP-SVS OF AIDE,EA 15 MIN: HCPCS

## 2023-05-08 ENCOUNTER — HOME CARE VISIT (OUTPATIENT)
Dept: SCHEDULING | Facility: HOME HEALTH | Age: 88
End: 2023-05-08
Payer: MEDICARE

## 2023-05-08 PROCEDURE — G0156 HHCP-SVS OF AIDE,EA 15 MIN: HCPCS

## 2023-05-08 PROCEDURE — G0299 HHS/HOSPICE OF RN EA 15 MIN: HCPCS

## 2023-05-09 VITALS — TEMPERATURE: 96.7 F | HEART RATE: 78 BPM | RESPIRATION RATE: 20 BRPM

## 2023-05-09 ASSESSMENT — ENCOUNTER SYMPTOMS
BOWEL INCONTINENCE: 1
STOOL DESCRIPTION: LARGE

## 2023-05-09 NOTE — HOSPICE
Daughter Del Aguila in [facility]. her concerns remain with her father as he recovers from fractured hip. Del Aguila continues to feel guilt that he fell while trying to provide her comfort. No concers re patient. Ortiz Jean-Baptiste paid cg at pt side in bedroom. Told RN discussed patient with Ortiz Jean-Baptiste CNA after her am visit. Assess as noted in review. Supples were ordered last week . profile meds on site, no usage. No caregiver concerns.

## 2023-05-10 ENCOUNTER — HOME CARE VISIT (OUTPATIENT)
Dept: SCHEDULING | Facility: HOME HEALTH | Age: 88
End: 2023-05-10
Payer: MEDICARE

## 2023-05-10 PROCEDURE — G0156 HHCP-SVS OF AIDE,EA 15 MIN: HCPCS

## 2023-05-12 ENCOUNTER — HOME CARE VISIT (OUTPATIENT)
Dept: HOSPICE | Facility: HOSPICE | Age: 88
End: 2023-05-12
Payer: MEDICARE

## 2023-05-12 ENCOUNTER — HOME CARE VISIT (OUTPATIENT)
Dept: SCHEDULING | Facility: HOME HEALTH | Age: 88
End: 2023-05-12
Payer: MEDICARE

## 2023-05-12 PROCEDURE — G0155 HHCP-SVS OF CSW,EA 15 MIN: HCPCS

## 2023-05-12 PROCEDURE — G0156 HHCP-SVS OF AIDE,EA 15 MIN: HCPCS

## 2023-05-15 ENCOUNTER — HOME CARE VISIT (OUTPATIENT)
Dept: HOSPICE | Facility: HOSPICE | Age: 88
End: 2023-05-15
Payer: MEDICARE

## 2023-05-16 ENCOUNTER — HOME CARE VISIT (OUTPATIENT)
Dept: SCHEDULING | Facility: HOME HEALTH | Age: 88
End: 2023-05-16
Payer: MEDICARE

## 2023-05-16 PROCEDURE — G0299 HHS/HOSPICE OF RN EA 15 MIN: HCPCS

## 2023-05-16 ASSESSMENT — ENCOUNTER SYMPTOMS: HEMOPTYSIS: 0

## 2023-05-16 NOTE — HOSPICE
Laura Aguilar is heading out to run some errands. She reports her father to still be at [facility] rehab without significant improvement. She says the facility is wanting to stop rehab due to lack of participation. She is stressed having to coordinate her dad's care. Emani Cabrera has no changes and continues to be well supported and cared for by her family and hired caregivers. Laura Aguilar is talkative and expressive. She did some life review. SW provided activelistening and emotional support. No change in needs for her mother. SW offered availability.

## 2023-05-17 ENCOUNTER — HOME CARE VISIT (OUTPATIENT)
Dept: SCHEDULING | Facility: HOME HEALTH | Age: 88
End: 2023-05-17
Payer: MEDICARE

## 2023-05-17 PROCEDURE — G0156 HHCP-SVS OF AIDE,EA 15 MIN: HCPCS

## 2023-05-19 ENCOUNTER — HOME CARE VISIT (OUTPATIENT)
Dept: SCHEDULING | Facility: HOME HEALTH | Age: 88
End: 2023-05-19
Payer: MEDICARE

## 2023-05-19 PROCEDURE — G0156 HHCP-SVS OF AIDE,EA 15 MIN: HCPCS

## 2023-05-22 ENCOUNTER — HOME CARE VISIT (OUTPATIENT)
Dept: SCHEDULING | Facility: HOME HEALTH | Age: 88
End: 2023-05-22
Payer: MEDICARE

## 2023-05-22 VITALS
RESPIRATION RATE: 24 BRPM | DIASTOLIC BLOOD PRESSURE: 77 MMHG | SYSTOLIC BLOOD PRESSURE: 137 MMHG | HEART RATE: 68 BPM | TEMPERATURE: 97.7 F

## 2023-05-22 PROCEDURE — G0156 HHCP-SVS OF AIDE,EA 15 MIN: HCPCS

## 2023-05-22 ASSESSMENT — ENCOUNTER SYMPTOMS
BOWEL INCONTINENCE: 1
STOOL DESCRIPTION: SOFT FORMED

## 2023-05-22 NOTE — HOSPICE
Amena CNA paid cg reports patient sleeping after completion of her bath and meal. Pt easily aroused. RN reverted to wrist bp after coaxing failed to get her to relax for brachial. Pt skin audit performed with assist of Amena. No moans or groans during assess. Neisha Proper daughter arrived during assess. She had no concerns over patient, her focus remains on her father in rehab. Reinforced hospcie avail, next visit planned 1 week.

## 2023-05-24 ENCOUNTER — HOME CARE VISIT (OUTPATIENT)
Dept: SCHEDULING | Facility: HOME HEALTH | Age: 88
End: 2023-05-24
Payer: MEDICARE

## 2023-05-24 PROCEDURE — G0156 HHCP-SVS OF AIDE,EA 15 MIN: HCPCS

## 2023-05-26 ENCOUNTER — HOME CARE VISIT (OUTPATIENT)
Dept: SCHEDULING | Facility: HOME HEALTH | Age: 88
End: 2023-05-26
Payer: MEDICARE

## 2023-05-26 ENCOUNTER — HOME CARE VISIT (OUTPATIENT)
Dept: HOSPICE | Facility: HOSPICE | Age: 88
End: 2023-05-26
Payer: MEDICARE

## 2023-05-26 PROCEDURE — G0299 HHS/HOSPICE OF RN EA 15 MIN: HCPCS

## 2023-05-29 ENCOUNTER — HOME CARE VISIT (OUTPATIENT)
Dept: HOSPICE | Facility: HOSPICE | Age: 88
End: 2023-05-29
Payer: MEDICARE

## 2023-05-31 ENCOUNTER — HOME CARE VISIT (OUTPATIENT)
Dept: SCHEDULING | Facility: HOME HEALTH | Age: 88
End: 2023-05-31
Payer: MEDICARE

## 2023-05-31 PROCEDURE — G0299 HHS/HOSPICE OF RN EA 15 MIN: HCPCS

## 2023-05-31 PROCEDURE — G0156 HHCP-SVS OF AIDE,EA 15 MIN: HCPCS

## 2023-06-01 VITALS
TEMPERATURE: 97.1 F | TEMPERATURE: 96.9 F | HEART RATE: 58 BPM | RESPIRATION RATE: 26 BRPM | SYSTOLIC BLOOD PRESSURE: 161 MMHG | RESPIRATION RATE: 20 BRPM | SYSTOLIC BLOOD PRESSURE: 130 MMHG | HEART RATE: 84 BPM | DIASTOLIC BLOOD PRESSURE: 71 MMHG | DIASTOLIC BLOOD PRESSURE: 61 MMHG

## 2023-06-01 ASSESSMENT — ENCOUNTER SYMPTOMS
STOOL DESCRIPTION: SOFT
STOOL DESCRIPTION: SOFT

## 2023-06-01 NOTE — HOSPICE
Amena paid caregiver presnet along with new backup orienting caregiver. Pt sleeping , relaxed with wide pened mouth. Cg reports HHA visited today and pt sleeps deeper after those visit, tires patient. Pt opened eyes to RN touch and voice. upper limbs crossed in contracture ,some tremors noted. Right fingers are puffy from edema, instruct Amena to place rolled cloth in hand. Amena believes patient may have lizette on hand during night. Roll placed. No swallowing or choking issues noted. No issues noted during assess. Assess as noted.

## 2023-06-02 ENCOUNTER — HOME CARE VISIT (OUTPATIENT)
Dept: SCHEDULING | Facility: HOME HEALTH | Age: 88
End: 2023-06-02
Payer: MEDICARE

## 2023-06-02 PROCEDURE — G0156 HHCP-SVS OF AIDE,EA 15 MIN: HCPCS

## 2023-06-06 ENCOUNTER — HOME CARE VISIT (OUTPATIENT)
Dept: SCHEDULING | Facility: HOME HEALTH | Age: 88
End: 2023-06-06
Payer: MEDICARE

## 2023-06-06 PROCEDURE — G0156 HHCP-SVS OF AIDE,EA 15 MIN: HCPCS

## 2023-06-07 ENCOUNTER — HOME CARE VISIT (OUTPATIENT)
Dept: SCHEDULING | Facility: HOME HEALTH | Age: 88
End: 2023-06-07
Payer: MEDICARE

## 2023-06-07 PROCEDURE — G0156 HHCP-SVS OF AIDE,EA 15 MIN: HCPCS

## 2023-06-08 ENCOUNTER — HOME CARE VISIT (OUTPATIENT)
Dept: HOSPICE | Facility: HOSPICE | Age: 88
End: 2023-06-08
Payer: MEDICARE

## 2023-06-08 ENCOUNTER — HOME CARE VISIT (OUTPATIENT)
Dept: SCHEDULING | Facility: HOME HEALTH | Age: 88
End: 2023-06-08
Payer: MEDICARE

## 2023-06-08 PROCEDURE — G0299 HHS/HOSPICE OF RN EA 15 MIN: HCPCS

## 2023-06-08 NOTE — CASE COMMUNICATION
phoned home to offer visit. Trupti Stuart answered phone and said Mr Hany Herrera had gone to a facility for rehab and that she did not know the name of the facility.  asked trupti to ask dtr Jed Diana to phone  and advise whether family would like  to visit pt Zack Heredia or  Padmini Josue. Trupti said she would do so.

## 2023-06-09 ENCOUNTER — HOME CARE VISIT (OUTPATIENT)
Dept: SCHEDULING | Facility: HOME HEALTH | Age: 88
End: 2023-06-09
Payer: MEDICARE

## 2023-06-09 PROCEDURE — G0156 HHCP-SVS OF AIDE,EA 15 MIN: HCPCS

## 2023-06-11 VITALS
DIASTOLIC BLOOD PRESSURE: 68 MMHG | SYSTOLIC BLOOD PRESSURE: 126 MMHG | TEMPERATURE: 96.9 F | RESPIRATION RATE: 20 BRPM | HEART RATE: 84 BPM

## 2023-06-11 ASSESSMENT — ENCOUNTER SYMPTOMS
BOWEL INCONTINENCE: 1
STOOL DESCRIPTION: SMEARS IN UNDERWEAR

## 2023-06-16 ENCOUNTER — HOME CARE VISIT (OUTPATIENT)
Dept: HOSPICE | Facility: HOSPICE | Age: 88
End: 2023-06-16
Payer: MEDICARE

## 2023-06-19 ENCOUNTER — HOME CARE VISIT (OUTPATIENT)
Dept: SCHEDULING | Facility: HOME HEALTH | Age: 88
End: 2023-06-19
Payer: MEDICARE

## 2023-06-19 PROCEDURE — G0156 HHCP-SVS OF AIDE,EA 15 MIN: HCPCS

## 2023-06-21 ENCOUNTER — HOME CARE VISIT (OUTPATIENT)
Dept: SCHEDULING | Facility: HOME HEALTH | Age: 88
End: 2023-06-21
Payer: MEDICARE

## 2023-06-21 PROCEDURE — G0156 HHCP-SVS OF AIDE,EA 15 MIN: HCPCS

## 2023-06-23 ENCOUNTER — HOME CARE VISIT (OUTPATIENT)
Dept: SCHEDULING | Facility: HOME HEALTH | Age: 88
End: 2023-06-23
Payer: MEDICARE

## 2023-06-23 PROCEDURE — G0156 HHCP-SVS OF AIDE,EA 15 MIN: HCPCS

## 2023-06-23 PROCEDURE — G0299 HHS/HOSPICE OF RN EA 15 MIN: HCPCS

## 2023-06-25 VITALS — DIASTOLIC BLOOD PRESSURE: 60 MMHG | SYSTOLIC BLOOD PRESSURE: 118 MMHG | HEART RATE: 62 BPM

## 2023-06-25 ASSESSMENT — ENCOUNTER SYMPTOMS
BOWEL INCONTINENCE: 1
STOOL DESCRIPTION: SOFT

## 2023-06-26 ENCOUNTER — HOME CARE VISIT (OUTPATIENT)
Dept: SCHEDULING | Facility: HOME HEALTH | Age: 88
End: 2023-06-26
Payer: MEDICARE

## 2023-06-26 PROCEDURE — G0156 HHCP-SVS OF AIDE,EA 15 MIN: HCPCS

## 2023-06-28 ENCOUNTER — HOME CARE VISIT (OUTPATIENT)
Dept: SCHEDULING | Facility: HOME HEALTH | Age: 88
End: 2023-06-28
Payer: MEDICARE

## 2023-06-28 VITALS — RESPIRATION RATE: 22 BRPM | DIASTOLIC BLOOD PRESSURE: 64 MMHG | HEART RATE: 72 BPM | SYSTOLIC BLOOD PRESSURE: 108 MMHG

## 2023-06-28 PROCEDURE — G0156 HHCP-SVS OF AIDE,EA 15 MIN: HCPCS

## 2023-06-28 PROCEDURE — G0299 HHS/HOSPICE OF RN EA 15 MIN: HCPCS

## 2023-06-28 ASSESSMENT — ENCOUNTER SYMPTOMS
STOOL DESCRIPTION: SOFT FORMED
BOWEL INCONTINENCE: 1

## 2023-06-30 ENCOUNTER — HOME CARE VISIT (OUTPATIENT)
Dept: SCHEDULING | Facility: HOME HEALTH | Age: 88
End: 2023-06-30
Payer: MEDICARE

## 2023-06-30 PROCEDURE — G0156 HHCP-SVS OF AIDE,EA 15 MIN: HCPCS

## 2023-07-03 ENCOUNTER — HOME CARE VISIT (OUTPATIENT)
Dept: SCHEDULING | Facility: HOME HEALTH | Age: 88
End: 2023-07-03
Payer: MEDICARE

## 2023-07-05 ENCOUNTER — HOME CARE VISIT (OUTPATIENT)
Dept: SCHEDULING | Facility: HOME HEALTH | Age: 88
End: 2023-07-05
Payer: MEDICARE

## 2023-07-05 PROCEDURE — G0156 HHCP-SVS OF AIDE,EA 15 MIN: HCPCS

## 2023-07-07 ENCOUNTER — HOME CARE VISIT (OUTPATIENT)
Dept: SCHEDULING | Facility: HOME HEALTH | Age: 88
End: 2023-07-07
Payer: MEDICARE

## 2023-07-07 PROCEDURE — G0156 HHCP-SVS OF AIDE,EA 15 MIN: HCPCS

## 2023-07-10 ENCOUNTER — HOME CARE VISIT (OUTPATIENT)
Dept: SCHEDULING | Facility: HOME HEALTH | Age: 88
End: 2023-07-10
Payer: MEDICARE

## 2023-07-10 VITALS
HEART RATE: 76 BPM | RESPIRATION RATE: 16 BRPM | DIASTOLIC BLOOD PRESSURE: 66 MMHG | SYSTOLIC BLOOD PRESSURE: 108 MMHG | TEMPERATURE: 97.9 F

## 2023-07-10 PROCEDURE — G0299 HHS/HOSPICE OF RN EA 15 MIN: HCPCS

## 2023-07-10 PROCEDURE — G0156 HHCP-SVS OF AIDE,EA 15 MIN: HCPCS

## 2023-07-10 ASSESSMENT — ENCOUNTER SYMPTOMS
BOWEL INCONTINENCE: 1
STOOL DESCRIPTION: LOOSE

## 2023-07-11 NOTE — HOSPICE
Pt alert,lying quietly in hospital bed, positione don right side with wedge support and feet floating. Amena paid cg art bedside. Amena reports   no new concerns. Assess as noted. Daughter Santo Cardona not home. Supplies to be ordered. Pt's  in his hospital bed next to hers. Informed paid caregiver of this RN off 7/13- 7/18 and reinforced hospice aavial as needed.  Amena voiced understnading

## 2023-07-12 ENCOUNTER — HOME CARE VISIT (OUTPATIENT)
Dept: HOSPICE | Facility: HOSPICE | Age: 88
End: 2023-07-12
Payer: MEDICARE

## 2023-07-12 ENCOUNTER — HOME CARE VISIT (OUTPATIENT)
Dept: SCHEDULING | Facility: HOME HEALTH | Age: 88
End: 2023-07-12
Payer: MEDICARE

## 2023-07-12 PROCEDURE — G0156 HHCP-SVS OF AIDE,EA 15 MIN: HCPCS

## 2023-07-12 PROCEDURE — G0155 HHCP-SVS OF CSW,EA 15 MIN: HCPCS

## 2023-07-12 NOTE — CASE COMMUNICATION
RN phoned daughter Elaine Theodore at 56 to discuss visit scheduling and RN death visit. Elaine Theodore reports patient okay and visit was rescheduled for 7/10. instructed to call hospice as needed.  Requested supplies to be ordered

## 2023-07-14 ENCOUNTER — HOME CARE VISIT (OUTPATIENT)
Dept: SCHEDULING | Facility: HOME HEALTH | Age: 88
End: 2023-07-14
Payer: MEDICARE

## 2023-07-14 PROCEDURE — G0156 HHCP-SVS OF AIDE,EA 15 MIN: HCPCS

## 2023-07-14 ASSESSMENT — ENCOUNTER SYMPTOMS: HEMOPTYSIS: 0

## 2023-07-14 NOTE — HOSPICE
Katina Meigs is sleeping soundly in her hospital bed. Her spouse, Norbert Coffey is asleep on his hospital next to hers. The hired caregiver, [address] and patient's dtr, Melany Mcclain are present. Maren reports no change in Isabelle's status or needs. Melany Mcclain is working on a [employer] project for friend. She is usually active , doing some crafts or jobs to keep busy. She says that her mom is doing well and that her father has improved some. They have been worried about him since his fall. Melany Mcclain did some life review. SW provided active listening and emotional support.   No change in needs or concerns

## 2023-07-17 ENCOUNTER — HOME CARE VISIT (OUTPATIENT)
Dept: SCHEDULING | Facility: HOME HEALTH | Age: 88
End: 2023-07-17
Payer: MEDICARE

## 2023-07-17 PROCEDURE — G0156 HHCP-SVS OF AIDE,EA 15 MIN: HCPCS

## 2023-07-18 ENCOUNTER — HOME CARE VISIT (OUTPATIENT)
Dept: SCHEDULING | Facility: HOME HEALTH | Age: 88
End: 2023-07-18
Payer: MEDICARE

## 2023-07-18 VITALS
SYSTOLIC BLOOD PRESSURE: 112 MMHG | HEART RATE: 64 BPM | TEMPERATURE: 97.7 F | RESPIRATION RATE: 14 BRPM | OXYGEN SATURATION: 96 % | DIASTOLIC BLOOD PRESSURE: 68 MMHG

## 2023-07-18 PROCEDURE — G0299 HHS/HOSPICE OF RN EA 15 MIN: HCPCS

## 2023-07-18 NOTE — HOSPICE
Routine visit completed. Daughter Marixa Bolanos and paid caregiver Jony Choi present. Per Marixa Bolanos no changes since last visit. She has not had to give any medications for symptom management since last week. Vitals per chart. Lungs sounds clear and diminished in the bases. Appetite remains the same, patient remains incontinent of bowel and bladder. Medications reviewed, no needs for refill at this time. Supplies reviewed and reordered through medline. Marixa Bolanos had no complaints or concerns at this time. RN will continue with current plan of care. Kimmie ko to call [facility] at any time for questions, changes or concerns.

## 2023-07-19 ENCOUNTER — HOME CARE VISIT (OUTPATIENT)
Dept: SCHEDULING | Facility: HOME HEALTH | Age: 88
End: 2023-07-19
Payer: MEDICARE

## 2023-07-19 PROCEDURE — G0156 HHCP-SVS OF AIDE,EA 15 MIN: HCPCS

## 2023-07-20 ENCOUNTER — HOME CARE VISIT (OUTPATIENT)
Dept: SCHEDULING | Facility: HOME HEALTH | Age: 88
End: 2023-07-20
Payer: MEDICARE

## 2023-07-20 NOTE — HOSPICE
S: pt Isabelle appearing to sleep soundly in hospital bed in bedroom;  Everett Baum in hospital bed in same room; Stuart & Stuart and ? present in next room  B: routine visit/assessment  A: pt not communicating, mouth-breathing;  sounding a little articulate than in earlier visits; talked with alber eyes about being weak, tired; said he will keep on trying to regain ability to walk; affirmed that sometimes he gets discouraged; said his family is doing pretty well; accepted offer of scripture, hymn, and prayer  R: assurance of care/availability; active listening; Psalm of praise; \"Praise to the Lord;\" prayer  Visit: 1 x / mo.; 2 prn

## 2023-07-21 ENCOUNTER — HOME CARE VISIT (OUTPATIENT)
Dept: SCHEDULING | Facility: HOME HEALTH | Age: 88
End: 2023-07-21
Payer: MEDICARE

## 2023-07-21 PROCEDURE — G0156 HHCP-SVS OF AIDE,EA 15 MIN: HCPCS

## 2023-07-24 ENCOUNTER — HOME CARE VISIT (OUTPATIENT)
Dept: SCHEDULING | Facility: HOME HEALTH | Age: 88
End: 2023-07-24
Payer: MEDICARE

## 2023-07-24 PROCEDURE — G0156 HHCP-SVS OF AIDE,EA 15 MIN: HCPCS

## 2023-07-25 ENCOUNTER — HOME CARE VISIT (OUTPATIENT)
Dept: SCHEDULING | Facility: HOME HEALTH | Age: 88
End: 2023-07-25
Payer: MEDICARE

## 2023-07-25 PROCEDURE — G0299 HHS/HOSPICE OF RN EA 15 MIN: HCPCS

## 2023-07-26 ENCOUNTER — HOME CARE VISIT (OUTPATIENT)
Dept: SCHEDULING | Facility: HOME HEALTH | Age: 88
End: 2023-07-26
Payer: MEDICARE

## 2023-07-26 VITALS — TEMPERATURE: 97.6 F | HEART RATE: 76 BPM | RESPIRATION RATE: 16 BRPM

## 2023-07-26 PROCEDURE — G0156 HHCP-SVS OF AIDE,EA 15 MIN: HCPCS

## 2023-07-26 ASSESSMENT — ENCOUNTER SYMPTOMS: STOOL DESCRIPTION: FORMED

## 2023-07-26 NOTE — HOSPICE
Pt lying quietly in med with eyes open. Crystal paid cg pres. Mansfield in his hospital bed next to patient and his paid cg Verenice present. RN unable to take blood pressure due to rigidity prevented RN from straightening arm. Pt did not show any objective signs of pain. RN assist Amena in light sponging and changing gown. Supplies adequate. No meds utilized. Cg had no new concerns, Carol Annjuliano Duongwin daughter arrived at end of visit. No concerns voiced. Next visit planned 1  week.  instruct hospice avail as needed

## 2023-07-28 ENCOUNTER — HOME CARE VISIT (OUTPATIENT)
Dept: SCHEDULING | Facility: HOME HEALTH | Age: 88
End: 2023-07-28
Payer: MEDICARE

## 2023-07-28 PROCEDURE — G0156 HHCP-SVS OF AIDE,EA 15 MIN: HCPCS

## 2023-07-31 ENCOUNTER — HOME CARE VISIT (OUTPATIENT)
Dept: SCHEDULING | Facility: HOME HEALTH | Age: 88
End: 2023-07-31
Payer: MEDICARE

## 2023-07-31 PROCEDURE — G0156 HHCP-SVS OF AIDE,EA 15 MIN: HCPCS

## 2023-08-02 ENCOUNTER — HOME CARE VISIT (OUTPATIENT)
Dept: SCHEDULING | Facility: HOME HEALTH | Age: 88
End: 2023-08-02
Payer: MEDICARE

## 2023-08-02 PROCEDURE — G0156 HHCP-SVS OF AIDE,EA 15 MIN: HCPCS

## 2023-08-04 ENCOUNTER — HOME CARE VISIT (OUTPATIENT)
Dept: SCHEDULING | Facility: HOME HEALTH | Age: 88
End: 2023-08-04
Payer: MEDICARE

## 2023-08-04 PROCEDURE — G0299 HHS/HOSPICE OF RN EA 15 MIN: HCPCS

## 2023-08-04 PROCEDURE — G0156 HHCP-SVS OF AIDE,EA 15 MIN: HCPCS

## 2023-08-06 VITALS — DIASTOLIC BLOOD PRESSURE: 58 MMHG | SYSTOLIC BLOOD PRESSURE: 100 MMHG | RESPIRATION RATE: 16 BRPM | HEART RATE: 54 BPM

## 2023-08-06 ASSESSMENT — ENCOUNTER SYMPTOMS: STOOL DESCRIPTION: SOFT

## 2023-08-07 ENCOUNTER — HOME CARE VISIT (OUTPATIENT)
Dept: SCHEDULING | Facility: HOME HEALTH | Age: 88
End: 2023-08-07
Payer: MEDICARE

## 2023-08-07 ENCOUNTER — HOME CARE VISIT (OUTPATIENT)
Dept: HOSPICE | Facility: HOSPICE | Age: 88
End: 2023-08-07
Payer: MEDICARE

## 2023-08-07 PROCEDURE — G0156 HHCP-SVS OF AIDE,EA 15 MIN: HCPCS

## 2023-08-07 PROCEDURE — G0155 HHCP-SVS OF CSW,EA 15 MIN: HCPCS

## 2023-08-09 ENCOUNTER — HOME CARE VISIT (OUTPATIENT)
Dept: SCHEDULING | Facility: HOME HEALTH | Age: 88
End: 2023-08-09
Payer: MEDICARE

## 2023-08-09 PROCEDURE — G0156 HHCP-SVS OF AIDE,EA 15 MIN: HCPCS

## 2023-08-10 ENCOUNTER — HOME CARE VISIT (OUTPATIENT)
Dept: SCHEDULING | Facility: HOME HEALTH | Age: 88
End: 2023-08-10
Payer: MEDICARE

## 2023-08-10 PROCEDURE — G0299 HHS/HOSPICE OF RN EA 15 MIN: HCPCS

## 2023-08-11 ENCOUNTER — HOME CARE VISIT (OUTPATIENT)
Dept: SCHEDULING | Facility: HOME HEALTH | Age: 88
End: 2023-08-11
Payer: MEDICARE

## 2023-08-11 VITALS
RESPIRATION RATE: 20 BRPM | SYSTOLIC BLOOD PRESSURE: 132 MMHG | HEART RATE: 88 BPM | DIASTOLIC BLOOD PRESSURE: 66 MMHG | TEMPERATURE: 97.1 F

## 2023-08-11 PROCEDURE — G0156 HHCP-SVS OF AIDE,EA 15 MIN: HCPCS

## 2023-08-14 ENCOUNTER — HOME CARE VISIT (OUTPATIENT)
Dept: SCHEDULING | Facility: HOME HEALTH | Age: 88
End: 2023-08-14
Payer: MEDICARE

## 2023-08-14 PROCEDURE — G0156 HHCP-SVS OF AIDE,EA 15 MIN: HCPCS

## 2023-08-16 ENCOUNTER — HOME CARE VISIT (OUTPATIENT)
Dept: SCHEDULING | Facility: HOME HEALTH | Age: 88
End: 2023-08-16
Payer: MEDICARE

## 2023-08-16 PROCEDURE — G0156 HHCP-SVS OF AIDE,EA 15 MIN: HCPCS

## 2023-08-17 ENCOUNTER — HOME CARE VISIT (OUTPATIENT)
Dept: SCHEDULING | Facility: HOME HEALTH | Age: 88
End: 2023-08-17
Payer: MEDICARE

## 2023-08-17 NOTE — HOSPICE
S: pt Isabelle lying in hospital be in bedroom, eyes opening, not communicating;  Cheryl Perez lying in hospital bed next to pt, just waking from snooze; dtr Kyra Reese and 2 caregivers present  B: routine visit/assessment  A: pt did not acknowledge or communicate with  and showed no signs of pain/distress;  greeted  pleasantly; affirmed that he had lived a productive and peace promoting life; accepted offer of scripture, hymn, and prayer  R: assurance of care/availability; active listening; Jn 14:25-27; \"I've Got Peace Like a River;\" prayer  Visit: 1 x / mo.; 2 prn

## 2023-08-18 ENCOUNTER — HOME CARE VISIT (OUTPATIENT)
Dept: SCHEDULING | Facility: HOME HEALTH | Age: 88
End: 2023-08-18
Payer: MEDICARE

## 2023-08-18 PROCEDURE — G0156 HHCP-SVS OF AIDE,EA 15 MIN: HCPCS

## 2023-08-18 PROCEDURE — G0299 HHS/HOSPICE OF RN EA 15 MIN: HCPCS

## 2023-08-20 VITALS — HEART RATE: 73 BPM | SYSTOLIC BLOOD PRESSURE: 122 MMHG | DIASTOLIC BLOOD PRESSURE: 70 MMHG | RESPIRATION RATE: 24 BRPM

## 2023-08-20 ASSESSMENT — ENCOUNTER SYMPTOMS: STOOL DESCRIPTION: SOFT

## 2023-08-21 ENCOUNTER — HOME CARE VISIT (OUTPATIENT)
Dept: SCHEDULING | Facility: HOME HEALTH | Age: 88
End: 2023-08-21
Payer: MEDICARE

## 2023-08-21 PROCEDURE — G0156 HHCP-SVS OF AIDE,EA 15 MIN: HCPCS

## 2023-08-23 ENCOUNTER — HOME CARE VISIT (OUTPATIENT)
Dept: SCHEDULING | Facility: HOME HEALTH | Age: 88
End: 2023-08-23
Payer: MEDICARE

## 2023-08-23 PROCEDURE — G0156 HHCP-SVS OF AIDE,EA 15 MIN: HCPCS

## 2023-08-25 ENCOUNTER — HOME CARE VISIT (OUTPATIENT)
Dept: SCHEDULING | Facility: HOME HEALTH | Age: 88
End: 2023-08-25
Payer: MEDICARE

## 2023-08-25 VITALS — TEMPERATURE: 97.2 F | RESPIRATION RATE: 16 BRPM | HEART RATE: 84 BPM

## 2023-08-25 PROCEDURE — G0299 HHS/HOSPICE OF RN EA 15 MIN: HCPCS

## 2023-08-25 PROCEDURE — G0156 HHCP-SVS OF AIDE,EA 15 MIN: HCPCS

## 2023-08-25 ASSESSMENT — ENCOUNTER SYMPTOMS
STOOL DESCRIPTION: SOFT FORMED
BOWEL INCONTINENCE: 1

## 2023-08-26 NOTE — HOSPICE
Assess as noted. Li Corey and Rossy Monroy voiced that patient no issues of concern and she continues to eat and drink well Supplies reviewed and adequate. Rio Liu Next visit planned for 8/31. Reinforced hospice avail as needed.

## 2023-08-28 ENCOUNTER — HOME CARE VISIT (OUTPATIENT)
Dept: SCHEDULING | Facility: HOME HEALTH | Age: 88
End: 2023-08-28
Payer: MEDICARE

## 2023-08-28 PROCEDURE — G0156 HHCP-SVS OF AIDE,EA 15 MIN: HCPCS

## 2023-08-30 ENCOUNTER — HOME CARE VISIT (OUTPATIENT)
Dept: SCHEDULING | Facility: HOME HEALTH | Age: 88
End: 2023-08-30
Payer: MEDICARE

## 2023-08-30 PROCEDURE — G0299 HHS/HOSPICE OF RN EA 15 MIN: HCPCS

## 2023-08-30 PROCEDURE — G0156 HHCP-SVS OF AIDE,EA 15 MIN: HCPCS

## 2023-08-31 VITALS
DIASTOLIC BLOOD PRESSURE: 64 MMHG | RESPIRATION RATE: 20 BRPM | SYSTOLIC BLOOD PRESSURE: 108 MMHG | HEART RATE: 96 BPM | TEMPERATURE: 99.4 F

## 2023-08-31 ASSESSMENT — ENCOUNTER SYMPTOMS: STOOL DESCRIPTION: FORMED

## 2023-09-01 ENCOUNTER — HOME CARE VISIT (OUTPATIENT)
Dept: SCHEDULING | Facility: HOME HEALTH | Age: 88
End: 2023-09-01
Payer: MEDICARE

## 2023-09-01 PROCEDURE — G0156 HHCP-SVS OF AIDE,EA 15 MIN: HCPCS

## 2023-09-01 NOTE — HOSPICE
Tila Rowe and Ahmet Spikes present. Assess as noted Pt temp usually 96-97 temporally, 99.4 flushed. Pt under several layers of fleece blankets. Discussed with caregivers could be responsible for deviation from her norm. Deny any infection exposure or symptoms. RN also noted particle hanging from mouth with orange drool. Pt had pimento cheese sandwich at supper. Ceil Spikes reports occasionally will note food remaining in mouth. Discussed/teaching to them to be alert pocketing often occurs with advancement of disease. Yenitheresa Brenner reports she has not been eating her large amounts las t few days. Informed best positioning after meals is lateral to lessen chances aspiration. Told discussed with Jonathan Ayala HHA today shared observation of worsening degree of rigidity of upper body making it more difficult for CNA to wash upper body underarms. Yenitheresa Brenner voiced she also having difficult time with same and using more force to clean there. Advised to be careful as could accidentally fracture arm. Discussed holiday on 9/4 and hospice avail as needed. Tila Rowe unable to locate hospice agency number, RN provided her number. Reinforced hospice avail as needed. Caregivers voiced understanding.  Both voiced HHA service not needed that day

## 2023-09-04 ENCOUNTER — HOME CARE VISIT (OUTPATIENT)
Dept: HOSPICE | Facility: HOSPICE | Age: 88
End: 2023-09-04
Payer: MEDICARE

## 2023-09-06 ENCOUNTER — HOME CARE VISIT (OUTPATIENT)
Dept: SCHEDULING | Facility: HOME HEALTH | Age: 88
End: 2023-09-06
Payer: MEDICARE

## 2023-09-06 PROCEDURE — G0156 HHCP-SVS OF AIDE,EA 15 MIN: HCPCS

## 2023-09-07 ENCOUNTER — HOME CARE VISIT (OUTPATIENT)
Dept: SCHEDULING | Facility: HOME HEALTH | Age: 88
End: 2023-09-07
Payer: MEDICARE

## 2023-09-07 PROCEDURE — G0299 HHS/HOSPICE OF RN EA 15 MIN: HCPCS

## 2023-09-08 ENCOUNTER — HOME CARE VISIT (OUTPATIENT)
Dept: SCHEDULING | Facility: HOME HEALTH | Age: 88
End: 2023-09-08
Payer: MEDICARE

## 2023-09-08 VITALS
DIASTOLIC BLOOD PRESSURE: 64 MMHG | HEART RATE: 92 BPM | RESPIRATION RATE: 16 BRPM | TEMPERATURE: 97.4 F | SYSTOLIC BLOOD PRESSURE: 118 MMHG

## 2023-09-08 PROCEDURE — G0156 HHCP-SVS OF AIDE,EA 15 MIN: HCPCS

## 2023-09-08 ASSESSMENT — ENCOUNTER SYMPTOMS: CONSTIPATION: 1

## 2023-09-08 NOTE — HOSPICE
Pt in bed alert,eyes open Sade at bedside. Her intake remins decreased Assess as noted. John Cifuentes daughter awakened by RN deaprting. She notes changes in appetite and bowel pattern. John Cifuentes reported she gave suppository 2 days ago without any report. Informed of RN intention to check and place suppository but upon discussion with Shannan Belling delayed because of John Cifuentes not feeling well. John Cifuentes agreed with plan devised and discussed in GI review. Taiwo to insert suppository in morning after cg arrives. and call if no bm. Discussed patient changes related to changes in her intake and could be signalling beginnings of change.  Reinforced hospice avail as needed

## 2023-09-14 NOTE — HOSPICE
Santa Simpson NP notified RN of chnages noted at her face to face this morning. Pt unable to swallow and not taking in food or liquid. Np reported she attempted to give her liquid and drooled from corner of mouth. Told her intake has progressively noted changing over last 2 weeks. RN contacted Betty Stern to inform of changes. Upon RN arrival Betty Stern present. providing support and talking with Jaime Face. Assess as noted. Informed Fito Urbina would visit 3 times this week, next on  to get clearer picture. and assess for any things that needed to be done . Told RN would increase visits to daily when chnages dictated. RN to contact  to inform of family request to talk re performing  her  services plans complete at [facility]. Reinforced hospice avail for any concerns or needs. georgi voiced understnading.

## 2023-09-14 NOTE — HOSPICE
Romi Qureshi is declining some. She is eating and drinking less and less. Today she is alert but not verbal. Her Spouse is in a hospital bed next to hers. He has his own sitter to address his care needs. AUDREY spoke with Jam Earl. She had a allergic reaction to novicain recently and is slowly recovering. Her face was red and swollen. The FNP visited this am and discussed her mom's decline with her. Jam Earl was tearful at times. SW provided active listening and emotional support. Jam Earl has been living with, and providing care for both of her parents for some years now. She has been able to provide optimal care for her parents. Her brother, Jack Tadeo, is out of the country presently. He was just made aware of his mom's decline. SW offered emotional support and availability. Man Wilder is not clear all of the time to comprehend the decline in his spouse.

## 2023-09-15 NOTE — HOSPICE
Joint visit with Bakari Blevins. Planned support teaching with Ravin Case and [address] [place] [address]. Ravin Case reports she sat pt up and attempted to give juice, pt didn't swallow and drooled from mouth. Reinforced risk of aspiration and distresing choking , stressed mouth care wiht sponges . Ravin Case called her brother for face time inclusion of assessment. Pt sleeping, resp even unlabored,shallow. Pt required more intense stimuli tthan pevious visit. Eyes opened. brief grimace. Left heel with approx 1cm round area of dti and sacal remains unchanged with bluish hue. Skin remains intac and explained signs of end of life,poor nutrition and decreasing circulation. Hells floated. Instruct entering active stage , unable to determine length. Praised and stressd comfort. Reinforced morphine for signs of pain or resp dsitess, rapid respiration. karma reports Javier Duttain visited yesterday, blue book provided. Jesus Spann and Ravin Case voiced thanked for [employer] support. Their plan is to tell their Dad when she is close to death so that they can push their hospital beds together to allow them to hold hands. RN informed death sometimes cccurs without warning, he voiced understnading. Finla plans are made. Answered Ravin Case questions re hospice notification at time of death. Next visit to be scheduled for 9/17.,family agreeable. Ravin Case will if any needs arise. This RN visit planned for 9/18.

## 2023-09-15 NOTE — HOSPICE
Focused revisit to assess for decline and any symptom manage needs. Zeina Ann paid caregiver a bedside, daughter Shelly Call returning soon. Pt sleeping with mouth wide opened, pallor, resp even shallow. Cg reports that patient is sleeping most hours of day and night. Performing moth care as she will close mouth or let food drool from mouth. Zeina Ann reported that Shelly Call wanted to know if patient needed morphine or oxygen and could RN call her brother Alexus Person to tell him what is going on with patient. . Paid caregiver  previously had experience  working at hospice [facility] and reported that she has been telling them of different stages and what to expect. Pt aroused easily to RN touch. No grimace or groan, lifted head from bed in response to RN scratching her head( pleasing and her usual response). She did not hold her arms as tightly closed as usual making it easy to obtain bp. Skin intact sacral area intact light bluish tint to coccyx. Caregiver continues to shift position with pillow support. Shelly Call returned during visit. Informed her that patient very comfortable, does not exhibit any shortness of breath and oxygen not required. Told patient in transitional stage and her course is her own. Reviewed signs that would indicate morphine usage for pain or shortness of breath e.g. groaning ,grimacing rapid respirations. Reviewed with Shelly Call dosage. Pt's  alert in hospital bed next to patient and asks if she okay. Family have not told him of any changes. They have discussed transferring him to another area if she dies. Informed them that RN and Sw could assist them with letting him know of her changing. and also conference with family to answer Chaim's question. Sehlly Call states that he concerned because we have not given him any documentation. Called Krishan VENTURA to inform her of visit and noted concerns by caregivers. Planned family conference for 9/15 between 10-11 am. RN requested Hemant Lopez deliver blue book.  Kimmie informed and

## 2023-09-15 NOTE — HOSPICE
S: pt Isabelle lying in hospital bed in bedroom;  Estevan Ortega in twin bed next to pt; dtr Amie Heard present first 20 min.; sitters Chuyita Mcdonald and [place] present; son Darek Amador arrived last do min.   B: routine visit/assessment with declining pt  A: pt contracted some, not speaking, has not eaten or drunk in a few days, not talking;  smiled at 's arrival; knew wife is sick though dtr says family is not telling him pt is transitioning due to his dementia; pt listened to scripture, sang along with hymn and accepted offer of prayer;\  dtr Amie Heard talked individually with  re her anger at God for letting her parents suffer; dtr said she feels much better after getting that off her heart  son Darek Amador recalled how he and his dad, who were in textile machinery repair business together, would always listen to gospel music on hours long trips between jobs  R: assurance of care/availability; active listening; Gen. 1; \"[place] We Gather at the River;\" prayer  Visit: 1 x / mo.; 2 prn

## 2023-09-21 ENCOUNTER — HOME CARE VISIT (OUTPATIENT)
Dept: SCHEDULING | Facility: HOME HEALTH | Age: 88
End: 2023-09-21
Payer: MEDICARE

## 2023-09-21 ENCOUNTER — HOME CARE VISIT (OUTPATIENT)
Dept: HOSPICE | Facility: HOSPICE | Age: 88
End: 2023-09-21
Payer: MEDICARE

## 2023-09-22 ASSESSMENT — ENCOUNTER SYMPTOMS: HEMOPTYSIS: 0

## 2024-05-17 NOTE — PROGRESS NOTES
Hospitalist Progress Note    Patient: Linda Ocampo MRN: [id number]  SSN: [id number]    YOB: 1928  Age: 80 y.o. Sex: female      Admit Date: 10/4/2019    LOS: 7 days     Subjective:     80year old CF with a PMH of end stage Alzheimers admitted with MATTHEW and hypernatremia due to dehydration from forgetting to eat and drink. 10/11 - Staring into space. Not eating or drinking well. Review of systems negative except stated above. Objective:     Visit Vitals  /62   Pulse 68   Temp 98.5 °F (36.9 °C)   Resp 18   Ht 4' 11\" (1.499 m)   Wt 48.6 kg (107 lb 3.2 oz)   SpO2 97%   BMI 21.65 kg/m²      Oxygen Therapy  O2 Sat (%): 97 % (10/11/19 1059)  Pulse via Oximetry: 66 beats per minute (10/07/19 2116)  O2 Device: Room air (10/10/19 1900)      Intake and Output:     Intake/Output Summary (Last 24 hours) at 10/11/2019 1150  Last data filed at 10/11/2019 1037  Gross per 24 hour   Intake 120 ml   Output 950 ml   Net -830 ml         Physical Exam:   GENERAL: drowsy, cooperative, no distress, appears stated age  EYE: conjunctivae/corneas clear. PERRL. THROAT & NECK: normal and no erythema or exudates noted. LUNG: clear to auscultation bilaterally  HEART: regular rate and rhythm, S1S2, no murmur, no JVD  ABDOMEN: soft, non-tender, non-distended. Bowel sounds normal.   EXTREMITIES:  No edema, 2+ pedal/radial pulses bilaterally  SKIN: no rash or abnormalities  NEUROLOGIC: Drowsy, oriented x 0. Cranial nerves 2-12 grossly intact. Lab/Data Review:  No results found for this or any previous visit (from the past 24 hour(s)). Imaging:  Ct Head Wo Cont    Result Date: 10/4/2019  CT HEAD WITHOUT CONTRAST HISTORY:  Transient alteration of awareness. . COMPARISON: None. TECHNIQUE: Axial imaging was performed without intravenous contrast utilizing 5mm slice thickness. Sagittal and coronal reformats were performed. Radiation dose reduction techniques were used for this study.  Our CT scanner uses one or all of the following: Automated exposure control, adjustment of the MAS or KUB according to patient's size and iterative reconstruction. FINDINGS:    *BRAIN:    -  There are no early signs of territorial or lacunar infarction by CT.    -  Symmetric supratentorial atrophy. -  For patient's age, the scattered areas of white matter hypodensities may represent a chronic small vessel white matter ischemia. However this is nonspecific. *VISUALIZED PARANASAL SINUSES: Well aerated. *MASTOIDS:  Clear. *CALVARIUM AND SCALP: Unremarkable. IMPRESSION: No acute intracranial abnormalities. Date of Dictation: 10/4/2019 11:34 PM     [address]    Result Date: 10/5/2019  Renal ultrasound, 10/5/2019 History: Acute kidney insufficiency. Comparison: None. Technique: Grayscale and doppler images of the kidneys and bladder were obtained using a 3-5 MHz linear transducer. Findings: The right kidney measures 10.4 cm, and the left kidney measures approximately 10.2 cm in greatest longitudinal length. Mild hydronephrosis is suggested of the right kidney. No definite evidence of hydronephrosis is seen. The left kidney is poorly visualized with questionable mild hydronephrosis suggested on some images. Renal parenchymal echogenicity is increased consistent with chronic medical renal changes. An approximately 2 cm cyst is seen in the upper pole cortex of the right kidney. The urinary bladder is poorly distended and therefore not well evaluated although shows no obvious abnormality. Mild wall thickening is seen likely due to the poorly distended state. IMPRESSION:   1. Mild bilateral hydronephrosis suggested. The etiology is not clearly demonstrated by ultrasound. Xr Chest Port    Result Date: 10/6/2019  EXAM: XR CHEST PORT INDICATION: leukocytosis COMPARISON: 10/4/2019 FINDINGS: A portable AP radiograph of the chest was obtained at 0453 hours. The patient is on a cardiac monitor. The lungs are clear. The cardiac and mediastinal contours and pulmonary vascularity are normal.  The bones and soft tissues are grossly within normal limits. IMPRESSION: No acute cardiopulmonary disease. Xr Chest Port    Result Date: 10/4/2019  EXAM: XR CHEST PORT INDICATION: AMS COMPARISON: None. FINDINGS: A portable AP radiograph of the chest was obtained at 2216 hours. The patient is on a cardiac monitor. The lungs are clear. Thoracic aortic aneurysm versus ectasia. .  The bones and soft tissues are grossly within normal limits. IMPRESSION: Thoracic aortic aneurysm versus ectasia. There are no prior studies. No results found for this visit on 10/04/19. Cultures:   All Micro Results     Procedure Component Value Units Date/Time    CULTURE, BLOOD [[id number]] Collected:  10/04/19 2213    Order Status:  Completed Specimen:  Blood Updated:  10/09/19 0949     Special Requests: --        RIGHT  Antecubital       Culture result: NO GROWTH 5 DAYS       CULTURE, BLOOD [[id number]] Collected:  10/04/19 2205    Order Status:  Completed Specimen:  Blood Updated:  10/09/19 0949     Special Requests: --        LEFT  Antecubital       Culture result: NO GROWTH 5 DAYS             Assessment/Plan:     Principal Problem:    MATTHEW (acute kidney injury) ([facility] [address] (10/4/2019)  - Resolving  - Stop IVFs  - Appreciate Nephrology's input    Active Problems:    Acute metabolic encephalopathy (73/4/7674)  - Slowly improving      Hypernatremia (10/4/2019)  - Due to dehydration  - Resolved      Severe dehydration (10/8/2019)  - Due to forgetting to eat and lack of participation in eating  - Improving on IVFs      Alzheimer's dementia without behavioral disturbance ([facility] [address] (10/8/2019)  - End stage --> forgetting to eat which seems hospice appropriate  - Hospice evaluated and said not a candidate because no pain or behavioral disturbance      Leukocytosis (10/8/2019)  - Likely reactive  - Cultures NGTD  - Continue to monitor      Adult failure to thrive (10/6/2019)      Macrocytic anemia (10/8/2019)  - Stable    Dispo - Discharge planning. DIET PUREED  DIET NUTRITIONAL SUPPLEMENTS All Meals;  Ensure [employer]  DIET NUTRITIONAL SUPPLEMENTS Lunch, Dinner; Magic Cups    DVT Prophylaxis: Heparin      Signed By: Moriah Somers DO     October 11, 2019 Fall with Harm Risk